# Patient Record
Sex: FEMALE | Race: WHITE | NOT HISPANIC OR LATINO
[De-identification: names, ages, dates, MRNs, and addresses within clinical notes are randomized per-mention and may not be internally consistent; named-entity substitution may affect disease eponyms.]

---

## 2017-01-09 ENCOUNTER — APPOINTMENT (OUTPATIENT)
Dept: HEMATOLOGY ONCOLOGY | Facility: CLINIC | Age: 52
End: 2017-01-09

## 2017-05-19 ENCOUNTER — APPOINTMENT (OUTPATIENT)
Dept: HEMATOLOGY ONCOLOGY | Facility: CLINIC | Age: 52
End: 2017-05-19

## 2017-06-08 ENCOUNTER — OUTPATIENT (OUTPATIENT)
Dept: OUTPATIENT SERVICES | Facility: HOSPITAL | Age: 52
LOS: 1 days | Discharge: HOME | End: 2017-06-08

## 2017-06-08 DIAGNOSIS — D72.829 ELEVATED WHITE BLOOD CELL COUNT, UNSPECIFIED: ICD-10-CM

## 2017-06-08 DIAGNOSIS — A08.39 OTHER VIRAL ENTERITIS: ICD-10-CM

## 2017-06-08 DIAGNOSIS — R22.0 LOCALIZED SWELLING, MASS AND LUMP, HEAD: ICD-10-CM

## 2017-06-08 DIAGNOSIS — T81.30XA DISRUPTION OF WOUND, UNSPECIFIED, INITIAL ENCOUNTER: ICD-10-CM

## 2017-06-08 DIAGNOSIS — R07.9 CHEST PAIN, UNSPECIFIED: ICD-10-CM

## 2017-06-08 DIAGNOSIS — Z93.3 COLOSTOMY STATUS: ICD-10-CM

## 2017-06-08 DIAGNOSIS — I10 ESSENTIAL (PRIMARY) HYPERTENSION: ICD-10-CM

## 2017-06-08 DIAGNOSIS — L40.9 PSORIASIS, UNSPECIFIED: ICD-10-CM

## 2017-06-08 DIAGNOSIS — I25.10 ATHEROSCLEROTIC HEART DISEASE OF NATIVE CORONARY ARTERY WITHOUT ANGINA PECTORIS: ICD-10-CM

## 2017-06-08 DIAGNOSIS — D64.9 ANEMIA, UNSPECIFIED: ICD-10-CM

## 2017-06-08 DIAGNOSIS — K57.32 DIVERTICULITIS OF LARGE INTESTINE WITHOUT PERFORATION OR ABSCESS WITHOUT BLEEDING: ICD-10-CM

## 2017-06-08 DIAGNOSIS — E11.622 TYPE 2 DIABETES MELLITUS WITH OTHER SKIN ULCER: ICD-10-CM

## 2017-06-08 DIAGNOSIS — K11.20 SIALOADENITIS, UNSPECIFIED: ICD-10-CM

## 2017-06-08 DIAGNOSIS — K92.1 MELENA: ICD-10-CM

## 2017-06-08 DIAGNOSIS — R18.8 OTHER ASCITES: ICD-10-CM

## 2017-06-08 DIAGNOSIS — E11.9 TYPE 2 DIABETES MELLITUS WITHOUT COMPLICATIONS: ICD-10-CM

## 2017-06-28 DIAGNOSIS — R18.8 OTHER ASCITES: ICD-10-CM

## 2017-08-09 ENCOUNTER — INPATIENT (INPATIENT)
Facility: HOSPITAL | Age: 52
LOS: 1 days | Discharge: HOME | End: 2017-08-11
Attending: INTERNAL MEDICINE | Admitting: INTERNAL MEDICINE

## 2017-08-09 DIAGNOSIS — A08.39 OTHER VIRAL ENTERITIS: ICD-10-CM

## 2017-08-09 DIAGNOSIS — L40.9 PSORIASIS, UNSPECIFIED: ICD-10-CM

## 2017-08-09 DIAGNOSIS — I25.10 ATHEROSCLEROTIC HEART DISEASE OF NATIVE CORONARY ARTERY WITHOUT ANGINA PECTORIS: ICD-10-CM

## 2017-08-09 DIAGNOSIS — E11.9 TYPE 2 DIABETES MELLITUS WITHOUT COMPLICATIONS: ICD-10-CM

## 2017-08-09 DIAGNOSIS — R07.9 CHEST PAIN, UNSPECIFIED: ICD-10-CM

## 2017-08-09 DIAGNOSIS — R18.8 OTHER ASCITES: ICD-10-CM

## 2017-08-09 DIAGNOSIS — Z93.3 COLOSTOMY STATUS: ICD-10-CM

## 2017-08-09 DIAGNOSIS — K57.32 DIVERTICULITIS OF LARGE INTESTINE WITHOUT PERFORATION OR ABSCESS WITHOUT BLEEDING: ICD-10-CM

## 2017-08-09 DIAGNOSIS — D64.9 ANEMIA, UNSPECIFIED: ICD-10-CM

## 2017-08-09 DIAGNOSIS — K11.20 SIALOADENITIS, UNSPECIFIED: ICD-10-CM

## 2017-08-09 DIAGNOSIS — E11.622 TYPE 2 DIABETES MELLITUS WITH OTHER SKIN ULCER: ICD-10-CM

## 2017-08-09 DIAGNOSIS — K92.1 MELENA: ICD-10-CM

## 2017-08-09 DIAGNOSIS — D72.829 ELEVATED WHITE BLOOD CELL COUNT, UNSPECIFIED: ICD-10-CM

## 2017-08-09 DIAGNOSIS — T81.30XA DISRUPTION OF WOUND, UNSPECIFIED, INITIAL ENCOUNTER: ICD-10-CM

## 2017-08-09 DIAGNOSIS — R22.0 LOCALIZED SWELLING, MASS AND LUMP, HEAD: ICD-10-CM

## 2017-08-09 DIAGNOSIS — I10 ESSENTIAL (PRIMARY) HYPERTENSION: ICD-10-CM

## 2017-08-15 DIAGNOSIS — E11.22 TYPE 2 DIABETES MELLITUS WITH DIABETIC CHRONIC KIDNEY DISEASE: ICD-10-CM

## 2017-08-15 DIAGNOSIS — R18.8 OTHER ASCITES: ICD-10-CM

## 2017-08-15 DIAGNOSIS — K74.60 UNSPECIFIED CIRRHOSIS OF LIVER: ICD-10-CM

## 2017-08-15 DIAGNOSIS — I25.10 ATHEROSCLEROTIC HEART DISEASE OF NATIVE CORONARY ARTERY WITHOUT ANGINA PECTORIS: ICD-10-CM

## 2017-08-15 DIAGNOSIS — Z79.4 LONG TERM (CURRENT) USE OF INSULIN: ICD-10-CM

## 2017-08-15 DIAGNOSIS — Z95.1 PRESENCE OF AORTOCORONARY BYPASS GRAFT: ICD-10-CM

## 2017-08-15 DIAGNOSIS — L02.211 CUTANEOUS ABSCESS OF ABDOMINAL WALL: ICD-10-CM

## 2017-08-15 DIAGNOSIS — I12.9 HYPERTENSIVE CHRONIC KIDNEY DISEASE WITH STAGE 1 THROUGH STAGE 4 CHRONIC KIDNEY DISEASE, OR UNSPECIFIED CHRONIC KIDNEY DISEASE: ICD-10-CM

## 2017-08-15 DIAGNOSIS — Z95.5 PRESENCE OF CORONARY ANGIOPLASTY IMPLANT AND GRAFT: ICD-10-CM

## 2017-08-15 DIAGNOSIS — K76.6 PORTAL HYPERTENSION: ICD-10-CM

## 2017-08-15 DIAGNOSIS — N18.9 CHRONIC KIDNEY DISEASE, UNSPECIFIED: ICD-10-CM

## 2017-08-15 DIAGNOSIS — D63.8 ANEMIA IN OTHER CHRONIC DISEASES CLASSIFIED ELSEWHERE: ICD-10-CM

## 2017-08-15 DIAGNOSIS — C91.10 CHRONIC LYMPHOCYTIC LEUKEMIA OF B-CELL TYPE NOT HAVING ACHIEVED REMISSION: ICD-10-CM

## 2017-08-15 DIAGNOSIS — K43.2 INCISIONAL HERNIA WITHOUT OBSTRUCTION OR GANGRENE: ICD-10-CM

## 2017-08-15 DIAGNOSIS — Z79.82 LONG TERM (CURRENT) USE OF ASPIRIN: ICD-10-CM
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
## 2017-08-17 DIAGNOSIS — S31.109A UNSPECIFIED OPEN WOUND OF ABDOMINAL WALL, UNSPECIFIED QUADRANT WITHOUT PENETRATION INTO PERITONEAL CAVITY, INITIAL ENCOUNTER: ICD-10-CM
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
## 2017-08-17 DIAGNOSIS — L98.499 NON-PRESSURE CHRONIC ULCER OF SKIN OF OTHER SITES WITH UNSPECIFIED SEVERITY: ICD-10-CM

## 2017-08-21 ENCOUNTER — APPOINTMENT (OUTPATIENT)
Dept: SURGERY | Facility: CLINIC | Age: 52
End: 2017-08-21

## 2017-08-21 VITALS
DIASTOLIC BLOOD PRESSURE: 55 MMHG | OXYGEN SATURATION: 94 % | HEART RATE: 41 BPM | WEIGHT: 86 LBS | HEIGHT: 55 IN | BODY MASS INDEX: 19.9 KG/M2 | SYSTOLIC BLOOD PRESSURE: 110 MMHG

## 2017-09-23 ENCOUNTER — INPATIENT (INPATIENT)
Facility: HOSPITAL | Age: 52
LOS: 12 days | Discharge: HOME | End: 2017-10-06
Attending: INTERNAL MEDICINE | Admitting: INTERNAL MEDICINE

## 2017-09-23 DIAGNOSIS — K11.20 SIALOADENITIS, UNSPECIFIED: ICD-10-CM

## 2017-09-23 DIAGNOSIS — R18.8 OTHER ASCITES: ICD-10-CM

## 2017-09-23 DIAGNOSIS — I25.10 ATHEROSCLEROTIC HEART DISEASE OF NATIVE CORONARY ARTERY WITHOUT ANGINA PECTORIS: ICD-10-CM

## 2017-09-23 DIAGNOSIS — D64.9 ANEMIA, UNSPECIFIED: ICD-10-CM

## 2017-09-23 DIAGNOSIS — R22.0 LOCALIZED SWELLING, MASS AND LUMP, HEAD: ICD-10-CM

## 2017-09-23 DIAGNOSIS — A08.39 OTHER VIRAL ENTERITIS: ICD-10-CM

## 2017-09-23 DIAGNOSIS — E11.622 TYPE 2 DIABETES MELLITUS WITH OTHER SKIN ULCER: ICD-10-CM

## 2017-09-23 DIAGNOSIS — K92.1 MELENA: ICD-10-CM

## 2017-09-23 DIAGNOSIS — T81.30XA DISRUPTION OF WOUND, UNSPECIFIED, INITIAL ENCOUNTER: ICD-10-CM

## 2017-09-23 DIAGNOSIS — I10 ESSENTIAL (PRIMARY) HYPERTENSION: ICD-10-CM

## 2017-09-23 DIAGNOSIS — R07.9 CHEST PAIN, UNSPECIFIED: ICD-10-CM

## 2017-09-23 DIAGNOSIS — K57.32 DIVERTICULITIS OF LARGE INTESTINE WITHOUT PERFORATION OR ABSCESS WITHOUT BLEEDING: ICD-10-CM

## 2017-09-23 DIAGNOSIS — E11.9 TYPE 2 DIABETES MELLITUS WITHOUT COMPLICATIONS: ICD-10-CM

## 2017-09-23 DIAGNOSIS — D72.829 ELEVATED WHITE BLOOD CELL COUNT, UNSPECIFIED: ICD-10-CM

## 2017-09-23 DIAGNOSIS — Z93.3 COLOSTOMY STATUS: ICD-10-CM

## 2017-09-23 DIAGNOSIS — L40.9 PSORIASIS, UNSPECIFIED: ICD-10-CM

## 2017-10-12 DIAGNOSIS — E11.9 TYPE 2 DIABETES MELLITUS WITHOUT COMPLICATIONS: ICD-10-CM

## 2017-10-12 DIAGNOSIS — Z79.82 LONG TERM (CURRENT) USE OF ASPIRIN: ICD-10-CM

## 2017-10-12 DIAGNOSIS — D72.829 ELEVATED WHITE BLOOD CELL COUNT, UNSPECIFIED: ICD-10-CM

## 2017-10-12 DIAGNOSIS — I21.4 NON-ST ELEVATION (NSTEMI) MYOCARDIAL INFARCTION: ICD-10-CM
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
## 2017-10-12 DIAGNOSIS — R18.8 OTHER ASCITES: ICD-10-CM

## 2017-10-12 DIAGNOSIS — Z90.49 ACQUIRED ABSENCE OF OTHER SPECIFIED PARTS OF DIGESTIVE TRACT: ICD-10-CM

## 2017-10-12 DIAGNOSIS — I10 ESSENTIAL (PRIMARY) HYPERTENSION: ICD-10-CM

## 2017-10-12 DIAGNOSIS — D63.8 ANEMIA IN OTHER CHRONIC DISEASES CLASSIFIED ELSEWHERE: ICD-10-CM

## 2017-10-12 DIAGNOSIS — N39.0 URINARY TRACT INFECTION, SITE NOT SPECIFIED: ICD-10-CM

## 2017-10-12 DIAGNOSIS — Z79.4 LONG TERM (CURRENT) USE OF INSULIN: ICD-10-CM

## 2017-10-12 DIAGNOSIS — I25.10 ATHEROSCLEROTIC HEART DISEASE OF NATIVE CORONARY ARTERY WITHOUT ANGINA PECTORIS: ICD-10-CM

## 2017-10-12 DIAGNOSIS — T81.30XA DISRUPTION OF WOUND, UNSPECIFIED, INITIAL ENCOUNTER: ICD-10-CM

## 2017-10-12 DIAGNOSIS — E87.1 HYPO-OSMOLALITY AND HYPONATREMIA: ICD-10-CM

## 2017-10-12 DIAGNOSIS — I95.9 HYPOTENSION, UNSPECIFIED: ICD-10-CM

## 2017-10-12 DIAGNOSIS — A04.72 ENTEROCOLITIS DUE TO CLOSTRIDIUM DIFFICILE, NOT SPECIFIED AS RECURRENT: ICD-10-CM

## 2017-10-12 DIAGNOSIS — Z95.5 PRESENCE OF CORONARY ANGIOPLASTY IMPLANT AND GRAFT: ICD-10-CM

## 2017-10-12 DIAGNOSIS — Z95.1 PRESENCE OF AORTOCORONARY BYPASS GRAFT: ICD-10-CM

## 2017-10-12 DIAGNOSIS — K74.60 UNSPECIFIED CIRRHOSIS OF LIVER: ICD-10-CM

## 2017-10-12 DIAGNOSIS — Z51.5 ENCOUNTER FOR PALLIATIVE CARE: ICD-10-CM

## 2017-10-12 DIAGNOSIS — R57.9 SHOCK, UNSPECIFIED: ICD-10-CM

## 2017-10-18 DIAGNOSIS — R57.1 HYPOVOLEMIC SHOCK: ICD-10-CM

## 2017-10-19 DIAGNOSIS — T81.31XA DISRUPTION OF EXTERNAL OPERATION (SURGICAL) WOUND, NOT ELSEWHERE CLASSIFIED, INITIAL ENCOUNTER: ICD-10-CM

## 2017-10-19 DIAGNOSIS — K43.9 VENTRAL HERNIA WITHOUT OBSTRUCTION OR GANGRENE: ICD-10-CM

## 2017-10-19 DIAGNOSIS — L03.311 CELLULITIS OF ABDOMINAL WALL: ICD-10-CM

## 2017-10-20 ENCOUNTER — APPOINTMENT (OUTPATIENT)
Dept: SURGERY | Facility: CLINIC | Age: 52
End: 2017-10-20
Payer: MEDICARE

## 2017-10-20 VITALS
BODY MASS INDEX: 19.9 KG/M2 | WEIGHT: 86 LBS | HEIGHT: 55 IN | SYSTOLIC BLOOD PRESSURE: 119 MMHG | DIASTOLIC BLOOD PRESSURE: 64 MMHG

## 2017-10-20 PROCEDURE — 99215 OFFICE O/P EST HI 40 MIN: CPT

## 2017-10-21 ENCOUNTER — INPATIENT (INPATIENT)
Facility: HOSPITAL | Age: 52
LOS: 11 days | Discharge: HOSPICE | End: 2017-11-02
Attending: HOSPITALIST

## 2017-10-21 DIAGNOSIS — K92.1 MELENA: ICD-10-CM

## 2017-10-21 DIAGNOSIS — D72.829 ELEVATED WHITE BLOOD CELL COUNT, UNSPECIFIED: ICD-10-CM

## 2017-10-21 DIAGNOSIS — I25.10 ATHEROSCLEROTIC HEART DISEASE OF NATIVE CORONARY ARTERY WITHOUT ANGINA PECTORIS: ICD-10-CM

## 2017-10-21 DIAGNOSIS — L40.9 PSORIASIS, UNSPECIFIED: ICD-10-CM

## 2017-10-21 DIAGNOSIS — K11.20 SIALOADENITIS, UNSPECIFIED: ICD-10-CM

## 2017-10-21 DIAGNOSIS — D64.9 ANEMIA, UNSPECIFIED: ICD-10-CM

## 2017-10-21 DIAGNOSIS — T81.30XA DISRUPTION OF WOUND, UNSPECIFIED, INITIAL ENCOUNTER: ICD-10-CM

## 2017-10-21 DIAGNOSIS — I10 ESSENTIAL (PRIMARY) HYPERTENSION: ICD-10-CM

## 2017-10-21 DIAGNOSIS — A08.39 OTHER VIRAL ENTERITIS: ICD-10-CM

## 2017-10-21 DIAGNOSIS — E11.622 TYPE 2 DIABETES MELLITUS WITH OTHER SKIN ULCER: ICD-10-CM

## 2017-10-21 DIAGNOSIS — E11.9 TYPE 2 DIABETES MELLITUS WITHOUT COMPLICATIONS: ICD-10-CM

## 2017-10-21 DIAGNOSIS — R18.8 OTHER ASCITES: ICD-10-CM

## 2017-10-21 DIAGNOSIS — Z93.3 COLOSTOMY STATUS: ICD-10-CM

## 2017-10-21 DIAGNOSIS — R22.0 LOCALIZED SWELLING, MASS AND LUMP, HEAD: ICD-10-CM

## 2017-10-21 DIAGNOSIS — R07.9 CHEST PAIN, UNSPECIFIED: ICD-10-CM

## 2017-10-21 DIAGNOSIS — K57.32 DIVERTICULITIS OF LARGE INTESTINE WITHOUT PERFORATION OR ABSCESS WITHOUT BLEEDING: ICD-10-CM

## 2017-11-02 ENCOUNTER — OUTPATIENT (OUTPATIENT)
Dept: OUTPATIENT SERVICES | Facility: HOSPITAL | Age: 52
LOS: 1 days | Discharge: HOSPICE | End: 2017-11-02

## 2017-11-03 DIAGNOSIS — K76.9 LIVER DISEASE, UNSPECIFIED: ICD-10-CM

## 2017-11-07 DIAGNOSIS — I13.0 HYPERTENSIVE HEART AND CHRONIC KIDNEY DISEASE WITH HEART FAILURE AND STAGE 1 THROUGH STAGE 4 CHRONIC KIDNEY DISEASE, OR UNSPECIFIED CHRONIC KIDNEY DISEASE: ICD-10-CM

## 2017-11-07 DIAGNOSIS — E80.6 OTHER DISORDERS OF BILIRUBIN METABOLISM: ICD-10-CM

## 2017-11-07 DIAGNOSIS — C91.10 CHRONIC LYMPHOCYTIC LEUKEMIA OF B-CELL TYPE NOT HAVING ACHIEVED REMISSION: ICD-10-CM
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
## 2017-11-07 DIAGNOSIS — E87.2 ACIDOSIS: ICD-10-CM

## 2017-11-07 DIAGNOSIS — K43.9 VENTRAL HERNIA WITHOUT OBSTRUCTION OR GANGRENE: ICD-10-CM

## 2017-11-07 DIAGNOSIS — R65.21 SEVERE SEPSIS WITH SEPTIC SHOCK: ICD-10-CM

## 2017-11-07 DIAGNOSIS — A41.9 SEPSIS, UNSPECIFIED ORGANISM: ICD-10-CM

## 2017-11-07 DIAGNOSIS — K76.7 HEPATORENAL SYNDROME: ICD-10-CM

## 2017-11-07 DIAGNOSIS — Z51.5 ENCOUNTER FOR PALLIATIVE CARE: ICD-10-CM

## 2017-11-07 DIAGNOSIS — Z95.5 PRESENCE OF CORONARY ANGIOPLASTY IMPLANT AND GRAFT: ICD-10-CM

## 2017-11-07 DIAGNOSIS — E11.22 TYPE 2 DIABETES MELLITUS WITH DIABETIC CHRONIC KIDNEY DISEASE: ICD-10-CM

## 2017-11-07 DIAGNOSIS — R18.8 OTHER ASCITES: ICD-10-CM

## 2017-11-07 DIAGNOSIS — Z93.3 COLOSTOMY STATUS: ICD-10-CM

## 2017-11-07 DIAGNOSIS — T81.4XXA INFECTION FOLLOWING A PROCEDURE, INITIAL ENCOUNTER: ICD-10-CM

## 2017-11-07 DIAGNOSIS — I95.89 OTHER HYPOTENSION: ICD-10-CM

## 2017-11-07 DIAGNOSIS — K65.2 SPONTANEOUS BACTERIAL PERITONITIS: ICD-10-CM

## 2017-11-07 DIAGNOSIS — N18.3 CHRONIC KIDNEY DISEASE, STAGE 3 (MODERATE): ICD-10-CM

## 2017-11-07 DIAGNOSIS — I50.22 CHRONIC SYSTOLIC (CONGESTIVE) HEART FAILURE: ICD-10-CM

## 2017-11-07 DIAGNOSIS — N17.9 ACUTE KIDNEY FAILURE, UNSPECIFIED: ICD-10-CM

## 2017-11-07 DIAGNOSIS — T81.31XA DISRUPTION OF EXTERNAL OPERATION (SURGICAL) WOUND, NOT ELSEWHERE CLASSIFIED, INITIAL ENCOUNTER: ICD-10-CM

## 2017-11-07 DIAGNOSIS — E87.1 HYPO-OSMOLALITY AND HYPONATREMIA: ICD-10-CM

## 2017-11-07 DIAGNOSIS — Z66 DO NOT RESUSCITATE: ICD-10-CM

## 2017-11-07 DIAGNOSIS — K74.60 UNSPECIFIED CIRRHOSIS OF LIVER: ICD-10-CM

## 2017-11-07 DIAGNOSIS — E11.65 TYPE 2 DIABETES MELLITUS WITH HYPERGLYCEMIA: ICD-10-CM

## 2017-11-07 DIAGNOSIS — J90 PLEURAL EFFUSION, NOT ELSEWHERE CLASSIFIED: ICD-10-CM

## 2017-11-07 DIAGNOSIS — K74.69 OTHER CIRRHOSIS OF LIVER: ICD-10-CM

## 2017-11-07 DIAGNOSIS — I25.10 ATHEROSCLEROTIC HEART DISEASE OF NATIVE CORONARY ARTERY WITHOUT ANGINA PECTORIS: ICD-10-CM

## 2017-11-07 DIAGNOSIS — L40.9 PSORIASIS, UNSPECIFIED: ICD-10-CM

## 2017-11-07 DIAGNOSIS — E86.1 HYPOVOLEMIA: ICD-10-CM

## 2017-11-09 DIAGNOSIS — K65.2 SPONTANEOUS BACTERIAL PERITONITIS: ICD-10-CM

## 2017-11-10 ENCOUNTER — APPOINTMENT (OUTPATIENT)
Dept: SURGERY | Facility: CLINIC | Age: 52
End: 2017-11-10
Payer: MEDICARE

## 2017-11-10 PROCEDURE — 99215 OFFICE O/P EST HI 40 MIN: CPT

## 2017-11-15 DIAGNOSIS — K74.60 UNSPECIFIED CIRRHOSIS OF LIVER: ICD-10-CM

## 2017-11-15 DIAGNOSIS — B15.9 HEPATITIS A WITHOUT HEPATIC COMA: ICD-10-CM

## 2017-11-15 DIAGNOSIS — Z95.9 PRESENCE OF CARDIAC AND VASCULAR IMPLANT AND GRAFT, UNSPECIFIED: ICD-10-CM

## 2017-11-15 DIAGNOSIS — K57.92 DIVERTICULITIS OF INTESTINE, PART UNSPECIFIED, WITHOUT PERFORATION OR ABSCESS WITHOUT BLEEDING: ICD-10-CM

## 2017-11-15 DIAGNOSIS — R18.8 OTHER ASCITES: ICD-10-CM

## 2017-11-15 DIAGNOSIS — Z95.1 PRESENCE OF AORTOCORONARY BYPASS GRAFT: ICD-10-CM

## 2017-11-15 DIAGNOSIS — I10 ESSENTIAL (PRIMARY) HYPERTENSION: ICD-10-CM

## 2017-11-15 DIAGNOSIS — K46.9 UNSPECIFIED ABDOMINAL HERNIA WITHOUT OBSTRUCTION OR GANGRENE: ICD-10-CM

## 2017-12-15 ENCOUNTER — APPOINTMENT (OUTPATIENT)
Dept: SURGERY | Facility: CLINIC | Age: 52
End: 2017-12-15
Payer: MEDICARE

## 2017-12-15 PROCEDURE — 99215 OFFICE O/P EST HI 40 MIN: CPT | Mod: GV

## 2018-01-09 ENCOUNTER — MOBILE ON CALL (OUTPATIENT)
Age: 53
End: 2018-01-09

## 2018-01-09 DIAGNOSIS — R18.8 OTHER ASCITES: ICD-10-CM

## 2018-01-16 ENCOUNTER — OUTPATIENT (OUTPATIENT)
Dept: OUTPATIENT SERVICES | Facility: HOSPITAL | Age: 53
LOS: 1 days | Discharge: HOME | End: 2018-01-16

## 2018-01-16 DIAGNOSIS — I25.10 ATHEROSCLEROTIC HEART DISEASE OF NATIVE CORONARY ARTERY WITHOUT ANGINA PECTORIS: ICD-10-CM

## 2018-01-16 DIAGNOSIS — K57.32 DIVERTICULITIS OF LARGE INTESTINE WITHOUT PERFORATION OR ABSCESS WITHOUT BLEEDING: ICD-10-CM

## 2018-01-16 DIAGNOSIS — K92.1 MELENA: ICD-10-CM

## 2018-01-16 DIAGNOSIS — R22.0 LOCALIZED SWELLING, MASS AND LUMP, HEAD: ICD-10-CM

## 2018-01-16 DIAGNOSIS — R18.8 OTHER ASCITES: ICD-10-CM

## 2018-01-16 DIAGNOSIS — A08.39 OTHER VIRAL ENTERITIS: ICD-10-CM

## 2018-01-16 DIAGNOSIS — Z93.3 COLOSTOMY STATUS: ICD-10-CM

## 2018-01-16 DIAGNOSIS — T81.30XA DISRUPTION OF WOUND, UNSPECIFIED, INITIAL ENCOUNTER: ICD-10-CM

## 2018-01-16 DIAGNOSIS — E11.622 TYPE 2 DIABETES MELLITUS WITH OTHER SKIN ULCER: ICD-10-CM

## 2018-01-16 DIAGNOSIS — D72.829 ELEVATED WHITE BLOOD CELL COUNT, UNSPECIFIED: ICD-10-CM

## 2018-01-16 DIAGNOSIS — K11.20 SIALOADENITIS, UNSPECIFIED: ICD-10-CM

## 2018-01-16 DIAGNOSIS — E11.9 TYPE 2 DIABETES MELLITUS WITHOUT COMPLICATIONS: ICD-10-CM

## 2018-01-16 DIAGNOSIS — I10 ESSENTIAL (PRIMARY) HYPERTENSION: ICD-10-CM

## 2018-01-16 DIAGNOSIS — D64.9 ANEMIA, UNSPECIFIED: ICD-10-CM

## 2018-01-16 DIAGNOSIS — R07.9 CHEST PAIN, UNSPECIFIED: ICD-10-CM

## 2018-01-16 DIAGNOSIS — L40.9 PSORIASIS, UNSPECIFIED: ICD-10-CM

## 2018-01-24 ENCOUNTER — APPOINTMENT (OUTPATIENT)
Dept: SURGERY | Facility: CLINIC | Age: 53
End: 2018-01-24
Payer: MEDICARE

## 2018-01-24 PROCEDURE — 99215 OFFICE O/P EST HI 40 MIN: CPT

## 2018-01-25 ENCOUNTER — OUTPATIENT (OUTPATIENT)
Dept: OUTPATIENT SERVICES | Facility: HOSPITAL | Age: 53
LOS: 1 days | Discharge: HOME | End: 2018-01-25

## 2018-01-25 DIAGNOSIS — K76.9 LIVER DISEASE, UNSPECIFIED: ICD-10-CM

## 2018-02-02 ENCOUNTER — OUTPATIENT (OUTPATIENT)
Dept: OUTPATIENT SERVICES | Facility: HOSPITAL | Age: 53
LOS: 1 days | Discharge: HOME | End: 2018-02-02

## 2018-02-04 DIAGNOSIS — I25.10 ATHEROSCLEROTIC HEART DISEASE OF NATIVE CORONARY ARTERY WITHOUT ANGINA PECTORIS: ICD-10-CM

## 2018-02-04 DIAGNOSIS — K57.32 DIVERTICULITIS OF LARGE INTESTINE WITHOUT PERFORATION OR ABSCESS WITHOUT BLEEDING: ICD-10-CM

## 2018-02-04 DIAGNOSIS — A08.39 OTHER VIRAL ENTERITIS: ICD-10-CM

## 2018-02-04 DIAGNOSIS — K92.1 MELENA: ICD-10-CM

## 2018-02-04 DIAGNOSIS — D64.9 ANEMIA, UNSPECIFIED: ICD-10-CM

## 2018-02-04 DIAGNOSIS — L40.9 PSORIASIS, UNSPECIFIED: ICD-10-CM

## 2018-02-04 DIAGNOSIS — E11.9 TYPE 2 DIABETES MELLITUS WITHOUT COMPLICATIONS: ICD-10-CM

## 2018-02-04 DIAGNOSIS — D72.829 ELEVATED WHITE BLOOD CELL COUNT, UNSPECIFIED: ICD-10-CM

## 2018-02-04 DIAGNOSIS — K11.20 SIALOADENITIS, UNSPECIFIED: ICD-10-CM

## 2018-02-04 DIAGNOSIS — I10 ESSENTIAL (PRIMARY) HYPERTENSION: ICD-10-CM

## 2018-02-04 DIAGNOSIS — R18.8 OTHER ASCITES: ICD-10-CM

## 2018-02-04 DIAGNOSIS — Z93.3 COLOSTOMY STATUS: ICD-10-CM

## 2018-02-04 DIAGNOSIS — E11.622 TYPE 2 DIABETES MELLITUS WITH OTHER SKIN ULCER: ICD-10-CM

## 2018-02-04 DIAGNOSIS — R07.9 CHEST PAIN, UNSPECIFIED: ICD-10-CM

## 2018-02-04 DIAGNOSIS — R22.0 LOCALIZED SWELLING, MASS AND LUMP, HEAD: ICD-10-CM

## 2018-02-04 DIAGNOSIS — T81.30XA DISRUPTION OF WOUND, UNSPECIFIED, INITIAL ENCOUNTER: ICD-10-CM

## 2018-02-08 DIAGNOSIS — R18.8 OTHER ASCITES: ICD-10-CM

## 2018-02-08 DIAGNOSIS — I10 ESSENTIAL (PRIMARY) HYPERTENSION: ICD-10-CM

## 2018-02-08 DIAGNOSIS — E11.9 TYPE 2 DIABETES MELLITUS WITHOUT COMPLICATIONS: ICD-10-CM

## 2018-02-08 DIAGNOSIS — C91.10 CHRONIC LYMPHOCYTIC LEUKEMIA OF B-CELL TYPE NOT HAVING ACHIEVED REMISSION: ICD-10-CM

## 2018-02-08 DIAGNOSIS — K74.69 OTHER CIRRHOSIS OF LIVER: ICD-10-CM

## 2018-02-20 ENCOUNTER — OUTPATIENT (OUTPATIENT)
Dept: OUTPATIENT SERVICES | Facility: HOSPITAL | Age: 53
LOS: 1 days | Discharge: HOME | End: 2018-02-20

## 2018-02-28 ENCOUNTER — APPOINTMENT (OUTPATIENT)
Dept: SURGERY | Facility: CLINIC | Age: 53
End: 2018-02-28
Payer: MEDICARE

## 2018-02-28 VITALS
BODY MASS INDEX: 19.09 KG/M2 | SYSTOLIC BLOOD PRESSURE: 104 MMHG | HEIGHT: 55 IN | DIASTOLIC BLOOD PRESSURE: 68 MMHG | WEIGHT: 82.5 LBS

## 2018-02-28 PROCEDURE — 99215 OFFICE O/P EST HI 40 MIN: CPT

## 2018-03-12 ENCOUNTER — INPATIENT (INPATIENT)
Facility: HOSPITAL | Age: 53
LOS: 2 days | Discharge: HOSPICE | End: 2018-03-15
Attending: INTERNAL MEDICINE | Admitting: INTERNAL MEDICINE

## 2018-03-12 VITALS
HEART RATE: 119 BPM | OXYGEN SATURATION: 100 % | DIASTOLIC BLOOD PRESSURE: 76 MMHG | SYSTOLIC BLOOD PRESSURE: 137 MMHG | TEMPERATURE: 99 F | RESPIRATION RATE: 17 BRPM

## 2018-03-12 DIAGNOSIS — Z98.890 OTHER SPECIFIED POSTPROCEDURAL STATES: Chronic | ICD-10-CM

## 2018-03-12 DIAGNOSIS — Z95.1 PRESENCE OF AORTOCORONARY BYPASS GRAFT: Chronic | ICD-10-CM

## 2018-03-12 LAB
ALBUMIN SERPL ELPH-MCNC: 2.6 G/DL — LOW (ref 3–5.5)
ALP SERPL-CCNC: 906 U/L — HIGH (ref 30–115)
ALT FLD-CCNC: 60 U/L — HIGH (ref 0–41)
ANION GAP SERPL CALC-SCNC: 10 MMOL/L — SIGNIFICANT CHANGE UP (ref 7–14)
APPEARANCE UR: CLEAR — SIGNIFICANT CHANGE UP
APTT BLD: 25.5 SEC — LOW (ref 27–39.2)
AST SERPL-CCNC: 59 U/L — HIGH (ref 0–41)
BACTERIA # UR AUTO: SIGNIFICANT CHANGE UP
BASE EXCESS BLDV CALC-SCNC: -1 MMOL/L — SIGNIFICANT CHANGE UP (ref -2–2)
BASOPHILS # BLD AUTO: 0.04 K/UL — SIGNIFICANT CHANGE UP (ref 0–0.2)
BASOPHILS NFR BLD AUTO: 0.3 % — SIGNIFICANT CHANGE UP (ref 0–1)
BILIRUB SERPL-MCNC: 7.6 MG/DL — HIGH (ref 0.2–1.2)
BILIRUB UR-MCNC: NEGATIVE — SIGNIFICANT CHANGE UP
BLD GP AB SCN SERPL QL: SIGNIFICANT CHANGE UP
BUN SERPL-MCNC: 23 MG/DL — HIGH (ref 10–20)
CALCIUM SERPL-MCNC: 9.1 MG/DL — SIGNIFICANT CHANGE UP (ref 8.5–10.1)
CHLORIDE SERPL-SCNC: 99 MMOL/L — SIGNIFICANT CHANGE UP (ref 98–110)
CO2 SERPL-SCNC: 22 MMOL/L — SIGNIFICANT CHANGE UP (ref 17–32)
COLOR SPEC: YELLOW — SIGNIFICANT CHANGE UP
COMMENT - URINE: SIGNIFICANT CHANGE UP
CREAT SERPL-MCNC: 1 MG/DL — SIGNIFICANT CHANGE UP (ref 0.7–1.5)
DIFF PNL FLD: NEGATIVE — SIGNIFICANT CHANGE UP
EOSINOPHIL # BLD AUTO: 0.17 K/UL — SIGNIFICANT CHANGE UP (ref 0–0.7)
EOSINOPHIL NFR BLD AUTO: 1.2 % — SIGNIFICANT CHANGE UP (ref 0–8)
GLUCOSE SERPL-MCNC: 322 MG/DL — HIGH (ref 70–110)
GLUCOSE UR QL: 100 MG/DL
HCO3 BLDV-SCNC: 24 MMOL/L — SIGNIFICANT CHANGE UP (ref 22–29)
HCT VFR BLD CALC: 28.9 % — LOW (ref 37–47)
HGB BLD-MCNC: 9.6 G/DL — LOW (ref 12–16)
IMM GRANULOCYTES NFR BLD AUTO: 0.4 % — HIGH (ref 0.1–0.3)
INR BLD: 1.13 RATIO — SIGNIFICANT CHANGE UP (ref 0.65–1.3)
KETONES UR-MCNC: NEGATIVE — SIGNIFICANT CHANGE UP
LACTATE BLDV-MCNC: 2.5 MMOL/L — HIGH (ref 0.5–1.6)
LACTATE SERPL-SCNC: 2.8 MMOL/L — HIGH (ref 0.5–2.2)
LEUKOCYTE ESTERASE UR-ACNC: (no result)
LIDOCAIN IGE QN: 31 U/L — SIGNIFICANT CHANGE UP (ref 7–60)
LYMPHOCYTES # BLD AUTO: 43.4 % — SIGNIFICANT CHANGE UP (ref 20.5–51.1)
LYMPHOCYTES # BLD AUTO: 6.12 K/UL — HIGH (ref 1.2–3.4)
MCHC RBC-ENTMCNC: 31.8 PG — HIGH (ref 27–31)
MCHC RBC-ENTMCNC: 33.2 G/DL — SIGNIFICANT CHANGE UP (ref 32–37)
MCV RBC AUTO: 95.7 FL — SIGNIFICANT CHANGE UP (ref 81–99)
MONOCYTES # BLD AUTO: 0.69 K/UL — HIGH (ref 0.1–0.6)
MONOCYTES NFR BLD AUTO: 4.9 % — SIGNIFICANT CHANGE UP (ref 1.7–9.3)
NEUTROPHILS # BLD AUTO: 7.02 K/UL — HIGH (ref 1.4–6.5)
NEUTROPHILS NFR BLD AUTO: 49.8 % — SIGNIFICANT CHANGE UP (ref 42.2–75.2)
NITRITE UR-MCNC: NEGATIVE — SIGNIFICANT CHANGE UP
NRBC # BLD: 0 /100 WBCS — SIGNIFICANT CHANGE UP (ref 0–0)
PCO2 BLDV: 40 MMHG — LOW (ref 41–51)
PH BLDV: 7.38 — SIGNIFICANT CHANGE UP (ref 7.26–7.43)
PH UR: 6.5 — SIGNIFICANT CHANGE UP (ref 5–8)
PLATELET # BLD AUTO: 137 K/UL — SIGNIFICANT CHANGE UP (ref 130–400)
PO2 BLDV: 21 MMHG — SIGNIFICANT CHANGE UP (ref 20–40)
POTASSIUM SERPL-MCNC: 4.7 MMOL/L — SIGNIFICANT CHANGE UP (ref 3.5–5)
POTASSIUM SERPL-SCNC: 4.7 MMOL/L — SIGNIFICANT CHANGE UP (ref 3.5–5)
PROT SERPL-MCNC: 5.3 G/DL — LOW (ref 6–8)
PROT UR-MCNC: NEGATIVE MG/DL — SIGNIFICANT CHANGE UP
PROTHROM AB SERPL-ACNC: 12.2 SEC — SIGNIFICANT CHANGE UP (ref 9.95–12.87)
RBC # BLD: 3.02 M/UL — LOW (ref 4.2–5.4)
RBC # FLD: 13.5 % — SIGNIFICANT CHANGE UP (ref 11.5–14.5)
SAO2 % BLDV: 23 % — SIGNIFICANT CHANGE UP
SODIUM SERPL-SCNC: 131 MMOL/L — LOW (ref 135–146)
SP GR SPEC: 1.01 — SIGNIFICANT CHANGE UP (ref 1.01–1.03)
TYPE + AB SCN PNL BLD: SIGNIFICANT CHANGE UP
UROBILINOGEN FLD QL: 1 MG/DL (ref 0.2–0.2)
WBC # BLD: 14.1 K/UL — HIGH (ref 4.8–10.8)
WBC # FLD AUTO: 14.1 K/UL — HIGH (ref 4.8–10.8)
WBC UR QL: SIGNIFICANT CHANGE UP /HPF

## 2018-03-12 RX ORDER — SPIRONOLACTONE 25 MG/1
50 TABLET, FILM COATED ORAL DAILY
Qty: 0 | Refills: 0 | Status: DISCONTINUED | OUTPATIENT
Start: 2018-03-12 | End: 2018-03-15

## 2018-03-12 RX ORDER — SODIUM CHLORIDE 9 MG/ML
1000 INJECTION INTRAMUSCULAR; INTRAVENOUS; SUBCUTANEOUS ONCE
Qty: 0 | Refills: 0 | Status: COMPLETED | OUTPATIENT
Start: 2018-03-12 | End: 2018-03-12

## 2018-03-12 RX ORDER — INSULIN GLARGINE 100 [IU]/ML
15 INJECTION, SOLUTION SUBCUTANEOUS AT BEDTIME
Qty: 0 | Refills: 0 | Status: DISCONTINUED | OUTPATIENT
Start: 2018-03-12 | End: 2018-03-15

## 2018-03-12 RX ORDER — SODIUM CHLORIDE 9 MG/ML
3 INJECTION INTRAMUSCULAR; INTRAVENOUS; SUBCUTANEOUS ONCE
Qty: 0 | Refills: 0 | Status: COMPLETED | OUTPATIENT
Start: 2018-03-12 | End: 2018-03-12

## 2018-03-12 RX ORDER — SODIUM CHLORIDE 9 MG/ML
1000 INJECTION, SOLUTION INTRAVENOUS
Qty: 0 | Refills: 0 | Status: DISCONTINUED | OUTPATIENT
Start: 2018-03-12 | End: 2018-03-14

## 2018-03-12 RX ORDER — SPIRONOLACTONE 25 MG/1
0 TABLET, FILM COATED ORAL
Qty: 0 | Refills: 0 | COMMUNITY

## 2018-03-12 RX ORDER — FOLIC ACID 0.8 MG
0 TABLET ORAL
Qty: 0 | Refills: 0 | COMMUNITY

## 2018-03-12 RX ORDER — DEXTROSE 50 % IN WATER 50 %
25 SYRINGE (ML) INTRAVENOUS ONCE
Qty: 0 | Refills: 0 | Status: DISCONTINUED | OUTPATIENT
Start: 2018-03-12 | End: 2018-03-15

## 2018-03-12 RX ORDER — FOLIC ACID 0.8 MG
1 TABLET ORAL DAILY
Qty: 0 | Refills: 0 | Status: DISCONTINUED | OUTPATIENT
Start: 2018-03-12 | End: 2018-03-15

## 2018-03-12 RX ORDER — INSULIN LISPRO 100/ML
5 VIAL (ML) SUBCUTANEOUS
Qty: 0 | Refills: 0 | Status: DISCONTINUED | OUTPATIENT
Start: 2018-03-12 | End: 2018-03-15

## 2018-03-12 RX ORDER — HYDROXYZINE HCL 10 MG
0 TABLET ORAL
Qty: 0 | Refills: 0 | COMMUNITY

## 2018-03-12 RX ORDER — DEXTROSE 50 % IN WATER 50 %
1 SYRINGE (ML) INTRAVENOUS ONCE
Qty: 0 | Refills: 0 | Status: DISCONTINUED | OUTPATIENT
Start: 2018-03-12 | End: 2018-03-15

## 2018-03-12 RX ORDER — ASPIRIN/CALCIUM CARB/MAGNESIUM 324 MG
81 TABLET ORAL DAILY
Qty: 0 | Refills: 0 | Status: DISCONTINUED | OUTPATIENT
Start: 2018-03-12 | End: 2018-03-15

## 2018-03-12 RX ORDER — FUROSEMIDE 40 MG
40 TABLET ORAL DAILY
Qty: 0 | Refills: 0 | Status: DISCONTINUED | OUTPATIENT
Start: 2018-03-12 | End: 2018-03-15

## 2018-03-12 RX ORDER — CEFTRIAXONE 500 MG/1
2 INJECTION, POWDER, FOR SOLUTION INTRAMUSCULAR; INTRAVENOUS EVERY 24 HOURS
Qty: 0 | Refills: 0 | Status: DISCONTINUED | OUTPATIENT
Start: 2018-03-12 | End: 2018-03-15

## 2018-03-12 RX ORDER — INSULIN LISPRO 100/ML
VIAL (ML) SUBCUTANEOUS
Qty: 0 | Refills: 0 | Status: DISCONTINUED | OUTPATIENT
Start: 2018-03-12 | End: 2018-03-15

## 2018-03-12 RX ORDER — DEXTROSE 50 % IN WATER 50 %
12.5 SYRINGE (ML) INTRAVENOUS ONCE
Qty: 0 | Refills: 0 | Status: DISCONTINUED | OUTPATIENT
Start: 2018-03-12 | End: 2018-03-15

## 2018-03-12 RX ORDER — GLUCAGON INJECTION, SOLUTION 0.5 MG/.1ML
1 INJECTION, SOLUTION SUBCUTANEOUS ONCE
Qty: 0 | Refills: 0 | Status: DISCONTINUED | OUTPATIENT
Start: 2018-03-12 | End: 2018-03-15

## 2018-03-12 RX ORDER — PANTOPRAZOLE SODIUM 20 MG/1
40 TABLET, DELAYED RELEASE ORAL
Qty: 0 | Refills: 0 | Status: DISCONTINUED | OUTPATIENT
Start: 2018-03-12 | End: 2018-03-15

## 2018-03-12 RX ORDER — PIPERACILLIN AND TAZOBACTAM 4; .5 G/20ML; G/20ML
3.38 INJECTION, POWDER, LYOPHILIZED, FOR SOLUTION INTRAVENOUS ONCE
Qty: 0 | Refills: 0 | Status: COMPLETED | OUTPATIENT
Start: 2018-03-12 | End: 2018-03-12

## 2018-03-12 RX ADMIN — Medication 81 MILLIGRAM(S): at 23:29

## 2018-03-12 RX ADMIN — Medication 40 MILLIGRAM(S): at 23:29

## 2018-03-12 RX ADMIN — SODIUM CHLORIDE 3 MILLILITER(S): 9 INJECTION INTRAMUSCULAR; INTRAVENOUS; SUBCUTANEOUS at 18:51

## 2018-03-12 RX ADMIN — SODIUM CHLORIDE 1000 MILLILITER(S): 9 INJECTION INTRAMUSCULAR; INTRAVENOUS; SUBCUTANEOUS at 18:52

## 2018-03-12 RX ADMIN — PIPERACILLIN AND TAZOBACTAM 200 GRAM(S): 4; .5 INJECTION, POWDER, LYOPHILIZED, FOR SOLUTION INTRAVENOUS at 18:51

## 2018-03-12 NOTE — H&P ADULT - ASSESSMENT
52 yof with pmh of cad s/p cabg liver cirrhosis 2/2 unknown etiology, diverticulitis-->fistula--> colostomy--> colostomy reversal-->incisional abdominal hernia currently on hospice care presented to hospital for cc of leaking ascitic fluid from belly.    1) liver cirrhosis/ ascitis  - IR for paracentesis  - surgery cs    2) cad/ liver cirrhosis/ htn/ dm  - c/w current meds    3) dispo  - hospice 52 yof with pmh of cad s/p cabg liver cirrhosis 2/2 unknown etiology, diverticulitis-->fistula--> colostomy--> colostomy reversal-->incisional abdominal hernia currently on hospice care presented to hospital for cc of leaking ascitic fluid from belly.    1) liver cirrhosis/ ascitis/ leukocytosis/ Hyperbilirubinemia  - start rocephin 2g daily  - c/w hospice care on DC  - IR for paracentesis  - surgery cs- s/p suturing of abdominal wall dehiscense    2) cad/ liver cirrhosis/ htn/ dm  - c/w current meds    3) dvt ppx  - will hold of on sc heparin for now in light of paracentesis procedure in am    4) dispo  - home hospice 52 yof with pmh of cad s/p cabg, end stage liver disease, liver cirrhosis 2/2 unknown etiology, diverticulitis-->fistula--> colostomy--> colostomy reversal-->incisional abdominal hernia--> wound dehiscence currently on hospice care presented to hospital for cc of leaking ascitic fluid from belly. pt being admitted for paracentesis    1) end stage liver disease/ liver cirrhosis/ ascitis/ leukocytosis/ Hyperbilirubinemia  - start rocephin 2g daily  - c/w hospice care on DC  - IR for paracentesis  - surgery cs- s/p suturing of abdominal wall dehiscense    2) cad/ liver cirrhosis/ htn/ dm  - c/w current meds    3) dvt ppx  - will hold of on sc heparin for now in light of paracentesis procedure in am    4) dispo  - home hospice

## 2018-03-12 NOTE — ED PROVIDER NOTE - CONSTITUTIONAL, MLM
normal... Pale, jaundice, Well appearinh, emaciated, awake, alert, oriented to person, place, time/situation and in no apparent distress.

## 2018-03-12 NOTE — CONSULT NOTE ADULT - SUBJECTIVE AND OBJECTIVE BOX
INTERVENTIONAL RADIOLOGY CONSULT:     Procedure Requested: image guided paracentesis    HPI: 53 y/o F w/cirrhosis, history of ventral hernia repair      PAST MEDICAL & SURGICAL HISTORY:  HTN (hypertension)  DM (diabetes mellitus)  Liver cirrhosis  Stented coronary artery  S/P CABG (coronary artery bypass graft)  CLL (chronic lymphocytic leukemia)  Hernia  History of colon resection      MEDICATIONS  (STANDING):    MEDICATIONS  (PRN):      Allergies    No Known Allergies    Intolerances        Social History:   Smoking: Yes [ ]  No [ ]   ______pk yrs  ETOH  Yes [ ]  No [ ]  Social [ ]  DRUGS:  Yes [ ]  No [ ]  if so what______________    FAMILY HISTORY:      Physical Exam:   Vital Signs Last 24 Hrs  T(C): 37 (12 Mar 2018 16:07), Max: 37 (12 Mar 2018 16:07)  T(F): 98.6 (12 Mar 2018 16:07), Max: 98.6 (12 Mar 2018 16:07)  HR: 119 (12 Mar 2018 16:07) (119 - 119)  BP: 137/76 (12 Mar 2018 16:07) (137/76 - 137/76)  BP(mean): --  RR: 17 (12 Mar 2018 16:07) (17 - 17)  SpO2: 100% (12 Mar 2018 16:07) (100% - 100%)    General:     Lungs:    Cardiovascular:     Abdomen:    Extremities:    Musculoskeletal:    Neuro/Psych:        Labs:                         9.6    14.10 )-----------( 137      ( 12 Mar 2018 18:38 )             28.9     03-12    131<L>  |  99  |  23<H>  ----------------------------<  322<H>  4.7   |  22  |  1.0    Ca    9.1      12 Mar 2018 18:38    TPro  5.3<L>  /  Alb  2.6<L>  /  TBili  7.6<H>  /  DBili  x   /  AST  59<H>  /  ALT  60<H>  /  AlkPhos  906<H>  03-12    PT/INR - ( 12 Mar 2018 18:38 )   PT: 12.20 sec;   INR: 1.13 ratio         PTT - ( 12 Mar 2018 18:38 )  PTT:25.5 sec    Pertinent labs:                      9.6    14.10 )-----------( 137      ( 12 Mar 2018 18:38 )             28.9       03-12    131<L>  |  99  |  23<H>  ----------------------------<  322<H>  4.7   |  22  |  1.0    Ca    9.1      12 Mar 2018 18:38    TPro  5.3<L>  /  Alb  2.6<L>  /  TBili  7.6<H>  /  DBili  x   /  AST  59<H>  /  ALT  60<H>  /  AlkPhos  906<H>  03-12      PT/INR - ( 12 Mar 2018 18:38 )   PT: 12.20 sec;   INR: 1.13 ratio         PTT - ( 12 Mar 2018 18:38 )  PTT:25.5 sec    Radiology & Additional Studies:     Radiology imaging reviewed.       ASSESSMENT/ PLAN:           Risks, benefits, and alternatives to treatment discussed. All questions answered with understanding.    Thank you for the courtesy of this consult, please call [phone] with any further questions. INTERVENTIONAL RADIOLOGY CONSULT:     Procedure Requested: image guided paracentesis    HPI: 53 y/o F w/cirrhosis, wound break down. Patient is s/p transverse loop colostomy, sigmoid resection with reversal. Patient has developed persistent drainage from the laparotomy incision. Surgery consulted IR for therapeutic image guided paracentesis to help control wound drainage. Previous paracentesis was approximately one month ago.    PAST MEDICAL & SURGICAL HISTORY:  HTN (hypertension)  DM (diabetes mellitus)  Liver cirrhosis  Stented coronary artery  S/P CABG (coronary artery bypass graft)  CLL (chronic lymphocytic leukemia)  Hernia  History of colon resection    MEDICATIONS  (STANDING):    MEDICATIONS  (PRN):    Allergies    No Known Allergies    Intolerances    Social History:   Smoking: Yes [ ]  No [ ]   ______pk yrs  ETOH  Yes [ ]  No [ ]  Social [ ]  DRUGS:  Yes [ ]  No [ ]  if so what______________    FAMILY HISTORY:    Physical Exam:   Vital Signs Last 24 Hrs  T(C): 37 (12 Mar 2018 16:07), Max: 37 (12 Mar 2018 16:07)  T(F): 98.6 (12 Mar 2018 16:07), Max: 98.6 (12 Mar 2018 16:07)  HR: 119 (12 Mar 2018 16:07) (119 - 119)  BP: 137/76 (12 Mar 2018 16:07) (137/76 - 137/76)  BP(mean): --  RR: 17 (12 Mar 2018 16:07) (17 - 17)  SpO2: 100% (12 Mar 2018 16:07) (100% - 100%)    Labs:                         9.6    14.10 )-----------( 137      ( 12 Mar 2018 18:38 )             28.9     03-12    131<L>  |  99  |  23<H>  ----------------------------<  322<H>  4.7   |  22  |  1.0    Ca    9.1      12 Mar 2018 18:38    TPro  5.3<L>  /  Alb  2.6<L>  /  TBili  7.6<H>  /  DBili  x   /  AST  59<H>  /  ALT  60<H>  /  AlkPhos  906<H>  03-12    PT/INR - ( 12 Mar 2018 18:38 )   PT: 12.20 sec;   INR: 1.13 ratio         PTT - ( 12 Mar 2018 18:38 )  PTT:25.5 sec    Pertinent labs:                      9.6    14.10 )-----------( 137      ( 12 Mar 2018 18:38 )             28.9       03-12    131<L>  |  99  |  23<H>  ----------------------------<  322<H>  4.7   |  22  |  1.0    Ca    9.1      12 Mar 2018 18:38    TPro  5.3<L>  /  Alb  2.6<L>  /  TBili  7.6<H>  /  DBili  x   /  AST  59<H>  /  ALT  60<H>  /  AlkPhos  906<H>  03-12      PT/INR - ( 12 Mar 2018 18:38 )   PT: 12.20 sec;   INR: 1.13 ratio         PTT - ( 12 Mar 2018 18:38 )  PTT:25.5 sec    Radiology & Additional Studies:     Radiology imaging reviewed.       ASSESSMENT/ PLAN:  53 y/o F w/cirrhosis, worsening wound drainage from ascites    1. Ascites - Plan for image guided paracentesis with IR. Will discuss with attendings in AM.    Risks, benefits, and alternatives to treatment discussed. All questions answered with understanding.    Thank you for the courtesy of this consult, please call [phone] with any further questions.

## 2018-03-12 NOTE — H&P ADULT - PMH
CAD (coronary artery disease)    CLL (chronic lymphocytic leukemia)    Diverticulitis    DM (diabetes mellitus)    Hernia    HTN (hypertension)    HTN (hypertension)    Liver cirrhosis    Psoriasis    S/P CABG (coronary artery bypass graft)    Stented coronary artery

## 2018-03-12 NOTE — H&P ADULT - ATTENDING COMMENTS
Addendum:  Patient seen and examined independently. Agree with resident note/ history / physical exam and plan of care with following exceptions. Case discussed with house-staff, nursing and patient/pt decision maker.   pt is going to be monitored overnight as per surgery. Discussed with Hospice. Pt is transferred to inpt hospice and home hospice upon dc when stable.

## 2018-03-12 NOTE — H&P ADULT - NSHPLABSRESULTS_GEN_ALL_CORE
Vital Signs Last 24 Hrs  T(C): 37 (12 Mar 2018 16:07), Max: 37 (12 Mar 2018 16:07)  T(F): 98.6 (12 Mar 2018 16:07), Max: 98.6 (12 Mar 2018 16:07)  HR: 119 (12 Mar 2018 16:07) (119 - 119)  BP: 137/76 (12 Mar 2018 16:07) (137/76 - 137/76)  BP(mean): --  RR: 17 (12 Mar 2018 16:07) (17 - 17)  SpO2: 100% (12 Mar 2018 16:07) (100% - 100%)                            9.6    14.10 )-----------( 137      ( 12 Mar 2018 18:38 )             28.9       03-12    131<L>  |  99  |  23<H>  ----------------------------<  322<H>  4.7   |  22  |  1.0    Ca    9.1      12 Mar 2018 18:38    TPro  5.3<L>  /  Alb  2.6<L>  /  TBili  7.6<H>  /  DBili  x   /  AST  59<H>  /  ALT  60<H>  /  AlkPhos  906<H>  0312              Urinalysis Basic - ( 12 Mar 2018 18:38 )    Color: Yellow / Appearance: Clear / S.010 / pH: x  Gluc: x / Ketone: Negative  / Bili: Negative / Urobili: 1.0 mg/dL   Blood: x / Protein: Negative mg/dL / Nitrite: Negative   Leuk Esterase: Trace / RBC: x / WBC 1-2 /HPF   Sq Epi: x / Non Sq Epi: x / Bacteria: See Note        PT/INR - ( 12 Mar 2018 18:38 )   PT: 12.20 sec;   INR: 1.13 ratio         PTT - ( 12 Mar 2018 18:38 )  PTT:25.5 sec    Lactate Trend   @ 18:38 Lactate:2.8             CAPILLARY BLOOD GLUCOSE

## 2018-03-12 NOTE — CONSULT NOTE ADULT - ATTENDING COMMENTS
Pt know to me .   On Hospice for Liver failure and Chronic Leukemia.   Multiple episodes of ascites draining from abdominal wall hernia.   This time again Suture in the ED twice.   Needs IR drain.   Colloid resuscitation.   GI ( Dr. Hall) for possible GI bleed.   Diuretics.  SBP prophylaxis

## 2018-03-12 NOTE — CONSULT NOTE ADULT - ASSESSMENT
51 YO F WITH LEAKING ASCITES FROM VENTRAL HERNIA    - LEAKING AREA REPAIRED IN ED WITH NYLON SUTURE  - WILL NEED PARACENTESIS TO DECOMPRESS  - SBP ANTIBIOTICS  - GI (ANDRENAZARIOS) TO EVAL FOR GI BLEED  - WILL FOLLOW

## 2018-03-12 NOTE — ED PROVIDER NOTE - MEDICAL DECISION MAKING DETAILS
Will admit for further eval and treatment.  Pt to be admitted to hospitalists Will admit for further eval and treatment.  Pt to be admitted to hospitalists Dr. Brown and Dr. Amador

## 2018-03-12 NOTE — H&P ADULT - NSHPPHYSICALEXAM_GEN_ALL_CORE
GENERAL: NAD, speaks in full sentences, no signs of respiratory distress  HEAD:  Atraumatic, Normocephalic  EYES: EOMI, PERRLA, conjunctiva and sclera clear  NECK: Supple, No JVD  CHEST/LUNG: Clear to auscultation bilaterally; No wheeze; No crackles; No accessory muscles used  HEART: Regular rate and rhythm; No murmurs;   ABDOMEN: Soft, Nontender, Nondistended; Bowel sounds present; No guarding  EXTREMITIES:  2+ Peripheral Pulses, No cyanosis or edema  PSYCH: AAOx3  NEUROLOGY: non-focal  SKIN: No rashes or lesions GENERAL: icterus  HEAD:  Atraumatic, Normocephalic  EYES: EOMI, PERRLA, conjunctiva and sclera clear  NECK: Supple, No JVD  CHEST/LUNG: Clear to auscultation bilaterally;   HEART: Regular rate and rhythm; No murmurs;   ABDOMEN: in abdominal bandage;   PSYCH: AAOx3  NEUROLOGY: non-focal

## 2018-03-12 NOTE — H&P ADULT - PSH
H/O colostomy    History of colon resection    S/P CABG (coronary artery bypass graft)    S/P cystoscopy

## 2018-03-12 NOTE — ED PROVIDER NOTE - OBJECTIVE STATEMENT
52 YOF PMHx CLL, DM, s/p colon resection and subsequent hernia and subsequent Liver cirrhosis a/w ascites, p/w abdominal ascites and fluid leaking from hernia.   Per pt and sister and Dr. Tyler, pt is hospice care and requires IR drainiage of ascites every 6 weeks.   Pt called Dr. Tyler today bc pt her ascites was draining from incisional sites.     Impression: Ascites w/ fluid leakage  Plan: Dr. Tyler was bedside to suture hernia/ incision site causing drainage.   Will check labs, EKG, and admit for IR drainage as requested.

## 2018-03-12 NOTE — ED ADULT NURSE NOTE - PMH
CLL (chronic lymphocytic leukemia)    DM (diabetes mellitus)    Hernia    HTN (hypertension)    Liver cirrhosis    S/P CABG (coronary artery bypass graft)    Stented coronary artery

## 2018-03-12 NOTE — ED ADULT NURSE NOTE - OBJECTIVE STATEMENT
pt presents to the ED with her sister who takes care of her at home who states pt started to have drainage from her abdominal. pt has ascites and had this happen before in the past. pt denies fevers/chills, nausea, vomiting. Sister also states pt had dark red stool. pt denies cp/sob.

## 2018-03-12 NOTE — H&P ADULT - NSHPSOCIALHISTORY_GEN_ALL_CORE
Marital Status:  (   )    (   ) Single    (   )    (  )   Occupation:   Lives with: (  ) alone  (  ) children   (  ) spouse   (  ) parents  (  ) other  Recent Travel:     Substance Use (street drugs): (  ) never used  (  ) other:  Tobacco Usage:  (   ) never smoked   (   ) former smoker   (   ) current smoker  (     ) pack year  Alcohol Usage:  Sexual History:

## 2018-03-12 NOTE — H&P ADULT - HISTORY OF PRESENT ILLNESS
52 yof with pmh of cad s/p cabg liver cirrhosis 2/2 unknown etiology, diverticulitis-->fistula--> colostomy--> colostomy reversal-->incisional abdominal hernia currently on hospice care presented to hospital for cc of leaking ascitic fluid from belly. pt usually gets paracentesis every 6 weeks by IR.  - pt currenty being admitted for paracentesis  - pt was seen by surgery & IR & is scheduled to get a paracentesis by IR  - pt had multiple hospital visits for similar complains. 52 yof with pmh of cad s/p cabg, end stage liver disease, liver cirrhosis 2/2 unknown etiology, diverticulitis-->fistula--> colostomy--> colostomy reversal-->incisional abdominal hernia--> wound dehiscence currently on hospice care presented to hospital for cc of leaking ascitic fluid from belly. pt usually gets paracentesis every 6 weeks by IR.  - pt currenty being admitted for paracentesis  - pt was seen by surgery & IR & is scheduled to get a paracentesis by IR  - pt had multiple hospital visits for similar complains.  - pt form home hospice; sister takes care of her

## 2018-03-12 NOTE — H&P ADULT - NSHPOUTPATIENTPROVIDERS_GEN_ALL_CORE
onco- dr andersen; gi - sharifa, surgery- jim; pcp- katy; IR at [facility]; hepatology- bach at [facility] for now

## 2018-03-12 NOTE — CONSULT NOTE ADULT - SUBJECTIVE AND OBJECTIVE BOX
DAPHNE TOYA [id number]  52y Female    HPI:  53YO FEMALE KNOWN TO SURGICAL SERVICE WITH ENDSTAGE CIRRHOSIS, VENTRAL HERNIA WITH ASCITES LEAKING FROM AREAS OF SKIN BREAKDOWN, WAS PREVIOUSLY TAPPED 1 MONTH AGO, HAS BEEN SUTURED MULTIPLE TIMES    PAST MEDICAL & SURGICAL HISTORY:  DIVERTICULITIS TRANSVERSE LOOP COLOSTOMY, CHRIS SIGMOID RESECTION, REVERSAL, COLOVESICULAR FISUTLA, CAD, HTN CABG, MI, LIVER MASS CIRRHOSIS, CLL, COPD      MEDICATIONS  (STANDING):  piperacillin/tazobactam IVPB. 3.375 Gram(s) IV Intermittent once  sodium chloride 0.9% Bolus 1000 milliLiter(s) IV Bolus once  sodium chloride 0.9% lock flush 3 milliLiter(s) IV Push once    MEDICATIONS  (PRN):      Allergies    No Known Allergies    Intolerances        REVIEW OF SYSTEMS    [ ] A ten-point review of systems was otherwise negative except as noted.    Vital Signs Last 24 Hrs  T(C): 37 (12 Mar 2018 16:07), Max: 37 (12 Mar 2018 16:07)  T(F): 98.6 (12 Mar 2018 16:07), Max: 98.6 (12 Mar 2018 16:07)  HR: 119 (12 Mar 2018 16:07) (119 - 119)  BP: 137/76 (12 Mar 2018 16:07) (137/76 - 137/76)  BP(mean): --  RR: 17 (12 Mar 2018 16:07) (17 - 17)  SpO2: 100% (12 Mar 2018 16:07) (100% - 100%)    PHYSICAL EXAM:  GENERAL: NAD, well-appearing  CHEST/LUNG: Clear to auscultation bilaterally  HEART: Regular rate and rhythm  ABDOMEN: Soft, Nontender, Distended LARGE VENTRAL HERNIA WITH SKIN CHANGES, LEAKING ASCITIES, MULTPLE PRIOR REPAIRS  EXTREMITIES:  No clubbing, cyanosis, or edema      LABS:  Labs:  CAPILLARY BLOOD GLUCOSE                      LFTs:         Coags:                RADIOLOGY & ADDITIONAL STUDIES:

## 2018-03-12 NOTE — ED PROVIDER NOTE - PROGRESS NOTE DETAILS
all studies reviewed.  Will admit to medicine for IR drainage.   Last drainage was three liters.  The prior was 6 liters.

## 2018-03-13 RX ORDER — INSULIN LISPRO 100/ML
6 VIAL (ML) SUBCUTANEOUS ONCE
Qty: 0 | Refills: 0 | Status: COMPLETED | OUTPATIENT
Start: 2018-03-13 | End: 2018-03-13

## 2018-03-13 RX ORDER — LACTULOSE 10 G/15ML
10 SOLUTION ORAL ONCE
Qty: 0 | Refills: 0 | Status: COMPLETED | OUTPATIENT
Start: 2018-03-13 | End: 2018-03-13

## 2018-03-13 RX ADMIN — Medication 1 MILLIGRAM(S): at 00:56

## 2018-03-13 RX ADMIN — CEFTRIAXONE 100 GRAM(S): 500 INJECTION, POWDER, FOR SOLUTION INTRAMUSCULAR; INTRAVENOUS at 06:16

## 2018-03-13 RX ADMIN — Medication 5: at 16:45

## 2018-03-13 RX ADMIN — SPIRONOLACTONE 50 MILLIGRAM(S): 25 TABLET, FILM COATED ORAL at 06:17

## 2018-03-13 RX ADMIN — PANTOPRAZOLE SODIUM 40 MILLIGRAM(S): 20 TABLET, DELAYED RELEASE ORAL at 06:16

## 2018-03-13 RX ADMIN — INSULIN GLARGINE 15 UNIT(S): 100 INJECTION, SOLUTION SUBCUTANEOUS at 23:09

## 2018-03-13 RX ADMIN — Medication 5 UNIT(S): at 16:46

## 2018-03-13 RX ADMIN — Medication 1 TABLET(S): at 11:32

## 2018-03-13 RX ADMIN — Medication 81 MILLIGRAM(S): at 11:34

## 2018-03-13 RX ADMIN — Medication 6 UNIT(S): at 23:10

## 2018-03-13 RX ADMIN — LACTULOSE 10 GRAM(S): 10 SOLUTION ORAL at 23:08

## 2018-03-13 RX ADMIN — Medication 1 MILLIGRAM(S): at 11:32

## 2018-03-13 NOTE — PROGRESS NOTE ADULT - ASSESSMENT
Assessment:  52y Female patient admitted with incisional hernia, leaking ascites, cirrhosis; dressing changedx2, 2-0 nylon figure of 8 sutures placed in abdomen to contain leaking ascites x2 in ED, with the above physical exam, labs, and imaging findings.    Plan:  admitted to medicine  US abdomen with results as above  F/U IR in AM regarding paracentesis   h/o large volume paracentesis, may require albumin  F/U AM labs  replete electrolytes as needed  Strict I+O  Diet as tolerated    Date/Time: 03-13-18 @ 03:36

## 2018-03-13 NOTE — PROGRESS NOTE ADULT - SUBJECTIVE AND OBJECTIVE BOX
Interventional Radiology Follow- Up Note    Spoke to Esperanza MALDONADO, charge RN for IR; primary team wants to temporarily cancel image guided paracentesis.    Vitals: T(F): 98.6 (03-12-18 @ 16:07), Max: 98.6 (03-12-18 @ 16:07)  HR: 98 (03-13-18 @ 06:08) (98 - 119)  BP: 131/74 (03-13-18 @ 06:08) (131/74 - 137/76)  RR: 20 (03-13-18 @ 06:08) (17 - 20)  SpO2: 100% (03-12-18 @ 16:07) (100% - 100%)  Wt(kg): --    Impression: 52y Female admitted with ASCITES    Plan:    1. Ascites - discussed with IR attending; will order abdominal ultrasound. Attempt made to contact primary admitted resident.    Please call Interventional Radiology [phone] with any questions, concerns, or issues regarding above. Interventional Radiology Follow- Up Note    Spoke to Esperanza MALDONADO, charge RN for IR; primary team wants to temporarily cancel image guided paracentesis.    Vitals: T(F): 98.6 (03-12-18 @ 16:07), Max: 98.6 (03-12-18 @ 16:07)  HR: 98 (03-13-18 @ 06:08) (98 - 119)  BP: 131/74 (03-13-18 @ 06:08) (131/74 - 137/76)  RR: 20 (03-13-18 @ 06:08) (17 - 20)  SpO2: 100% (03-12-18 @ 16:07) (100% - 100%)  Wt(kg): --    Impression: 52y Female admitted with ASCITES    Plan:    1. Ascites - discussed with IR attending; will order abdominal ultrasound. IR procedure order discontinued. Attempt made to contact primary admitted resident.    Please call Interventional Radiology [phone] with any questions, concerns, or issues regarding above.

## 2018-03-13 NOTE — PROVIDER CONTACT NOTE (OTHER) - BACKGROUND
pt arrived from ED, FS checked before bedtime. Fingerstick elevated 419. Pt scheduled for 10 units Lantus.

## 2018-03-13 NOTE — PROGRESS NOTE ADULT - SUBJECTIVE AND OBJECTIVE BOX
TOYA SERNA, female, 52y (67),   MRN-[id number]  Admit Date: 18 (1d)    Chief Complaint  Patient is a 52y old  Female who presents with a chief complaint of leaking ascitic fluid from belly (12 Mar 2018 22:16)      Past Medical and Surgical History  PAST MEDICAL & SURGICAL HISTORY:  Psoriasis  HTN (hypertension)  CAD (coronary artery disease)  Diverticulitis  HTN (hypertension)  DM (diabetes mellitus)  Liver cirrhosis  Stented coronary artery  S/P CABG (coronary artery bypass graft)  CLL (chronic lymphocytic leukemia)  Hernia  S/P cystoscopy  H/O colostomy  S/P CABG (coronary artery bypass graft)  History of colon resection      Current Medications:  MEDICATIONS  (STANDING):  aspirin  chewable 81 milliGRAM(s) Oral daily  cefTRIAXone   IVPB 2 Gram(s) IV Intermittent every 24 hours  dextrose 5%. 1000 milliLiter(s) (50 mL/Hr) IV Continuous <Continuous>  dextrose 50% Injectable 12.5 Gram(s) IV Push once  dextrose 50% Injectable 25 Gram(s) IV Push once  dextrose 50% Injectable 25 Gram(s) IV Push once  folic acid 1 milliGRAM(s) Oral daily  furosemide    Tablet 40 milliGRAM(s) Oral daily  insulin glargine Injectable (LANTUS) 15 Unit(s) SubCutaneous at bedtime  insulin lispro (HumaLOG) corrective regimen sliding scale   SubCutaneous three times a day before meals  insulin lispro Injectable (HumaLOG) 5 Unit(s) SubCutaneous before breakfast  insulin lispro Injectable (HumaLOG) 5 Unit(s) SubCutaneous before lunch  insulin lispro Injectable (HumaLOG) 5 Unit(s) SubCutaneous before dinner  multivitamin 1 Tablet(s) Oral daily  pantoprazole    Tablet 40 milliGRAM(s) Oral before breakfast  spironolactone 50 milliGRAM(s) Oral daily    MEDICATIONS  (PRN):  dextrose Gel 1 Dose(s) Oral once PRN Blood Glucose LESS THAN 70 milliGRAM(s)/deciliter  glucagon  Injectable 1 milliGRAM(s) IntraMuscular once PRN Glucose LESS THAN 70 milligrams/deciliter      Interval History:  No acute events overnight. No complaints at this time.    Vital Signs:  T(F): 98.6 (18 @ 16:07), Max: 98.6 (18 @ 16:07)  HR: 98 (18 @ 06:08) (98 - 119)  BP: 131/74 (18 @ 06:08) (131/74 - 137/76)  RR: 20 (18 @ 06:08) (17 - 20)  SpO2: 100% (18 @ 16:07) (100% - 100%)  CAPILLARY BLOOD GLUCOSE  149 (12 Mar 2018 23:29)          Physical Exam:  General: Not in distress.   HEENT: Moist mucus membranes. PERRLA.  Cardio: Regular rate and rhythm, S1, S2, no murmur, rub, or gallop.  Pulm: Clear to auscultation bilaterally. No wheezing, rales, or rhonchi  Abdomen: Soft, non-tender, non-distended. Normoactive bowel sounds  Extremities: No cyanosis or edema bilaterally. No calf tenderness to palpation  Neuro: A&O x3. No focal deficits. CN II - XII grossly intact.    Labs and Imaging:  CBC Full  -  ( 12 Mar 2018 18:38 )  WBC Count : 14.10 K/uL  Hemoglobin : 9.6 g/dL  Hematocrit : 28.9 %  Platelet Count - Automated : 137 K/uL  Mean Cell Volume : 95.7 fL  Mean Cell Hemoglobin : 31.8 pg  Mean Cell Hemoglobin Concentration : 33.2 g/dL  Auto Neutrophil # : 7.02 K/uL  Auto Lymphocyte # : 6.12 K/uL  Auto Monocyte # : 0.69 K/uL  Auto Eosinophil # : 0.17 K/uL  Auto Basophil # : 0.04 K/uL  Auto Neutrophil % : 49.8 %  Auto Lymphocyte % : 43.4 %  Auto Monocyte % : 4.9 %  Auto Eosinophil % : 1.2 %  Auto Basophil % : 0.3 %    RDW: 13.5    PT/INR/PTT: 18 @ 18:38  12.20 | 25.5        ^      1.13    BMP: 18 @ 18:38  131 | 99 | 23   -----------------< 322  4.7  | 22 | 1.0  eGFR(AA): 75, eGFR (non-AA): 64  Ca 9.1, Mg --, P --    LFTs: 18 @ 18:38  TP  5.3  | 2.6 Alb   ---------------  TB  7.6  | --  DB   ---------------  ALT 60   | 59  AST          ^        906         ALK      LFTs: 18 @ 18:38  Ca  9.1  | 59 AST   -----------------  TP  5.3  | 60 ALT  -----------------  Alb 2.6  | 906 ALK          ^        7.6         TB      Cardiac Enzymes:    Urinalysis:  Urinalysis Basic - ( 12 Mar 2018 18:38 )    Color: Yellow / Appearance: Clear / S.010 / pH: x  Gluc: x / Ketone: Negative  / Bili: Negative / Urobili: 1.0 mg/dL   Blood: x / Protein: Negative mg/dL / Nitrite: Negative   Leuk Esterase: Trace / RBC: x / WBC 1-2 /HPF   Sq Epi: x / Non Sq Epi: x / Bacteria: See Note      Home Medications:  Home Medications:  aspirin 81 mg oral tablet, chewable: 1 tab(s) orally once a day (12 Mar 2018 18:49)  docusate:  (12 Mar 2018 18:49)  folic acid: 1 milligram(s) orally once a day (12 Mar 2018 22:56)  furosemide 40 mg oral tablet: 1 tab(s) orally once a day (12 Mar 2018 18:49)  HumaLOG:  (12 Mar 2018 18:49)  Levemir 100 units/mL subcutaneous solution:  (12 Mar 2018 18:49)  LORazepam 1 mg oral tablet:  (12 Mar 2018 18:49)  pantoprazole:  (12 Mar 2018 18:49)  potassium chloride:  (12 Mar 2018 18:49)  spironolactone: 50 milligram(s) orally once a day (12 Mar 2018 22:57)      Assessment:  52y female with PMH listed presented for .  Found to have Ascites      Plan:  Ascites      Nanci Okeefe MD.  Date/Time: 18 @ 08:23 TOYA SERNA, female, 52y (67),   MRN-[id number]  Admit Date: 18 (1d)    Chief Complaint  Patient is a 52y old  Female who presents with a chief complaint of leaking ascitic fluid from belly (12 Mar 2018 22:16)      Past Medical and Surgical History  PAST MEDICAL & SURGICAL HISTORY:  Psoriasis  HTN (hypertension)  CAD (coronary artery disease)  Diverticulitis  HTN (hypertension)  DM (diabetes mellitus)  Liver cirrhosis  Stented coronary artery  S/P CABG (coronary artery bypass graft)  CLL (chronic lymphocytic leukemia)  Hernia  S/P cystoscopy  H/O colostomy  S/P CABG (coronary artery bypass graft)  History of colon resection      Current Medications:  MEDICATIONS  (STANDING):  aspirin  chewable 81 milliGRAM(s) Oral daily  cefTRIAXone   IVPB 2 Gram(s) IV Intermittent every 24 hours  dextrose 5%. 1000 milliLiter(s) (50 mL/Hr) IV Continuous <Continuous>  dextrose 50% Injectable 12.5 Gram(s) IV Push once  dextrose 50% Injectable 25 Gram(s) IV Push once  dextrose 50% Injectable 25 Gram(s) IV Push once  folic acid 1 milliGRAM(s) Oral daily  furosemide    Tablet 40 milliGRAM(s) Oral daily  insulin glargine Injectable (LANTUS) 15 Unit(s) SubCutaneous at bedtime  insulin lispro (HumaLOG) corrective regimen sliding scale   SubCutaneous three times a day before meals  insulin lispro Injectable (HumaLOG) 5 Unit(s) SubCutaneous before breakfast  insulin lispro Injectable (HumaLOG) 5 Unit(s) SubCutaneous before lunch  insulin lispro Injectable (HumaLOG) 5 Unit(s) SubCutaneous before dinner  multivitamin 1 Tablet(s) Oral daily  pantoprazole    Tablet 40 milliGRAM(s) Oral before breakfast  spironolactone 50 milliGRAM(s) Oral daily    MEDICATIONS  (PRN):  dextrose Gel 1 Dose(s) Oral once PRN Blood Glucose LESS THAN 70 milliGRAM(s)/deciliter  glucagon  Injectable 1 milliGRAM(s) IntraMuscular once PRN Glucose LESS THAN 70 milligrams/deciliter      Interval History:  No acute events overnight. No complaints at this time.    Vital Signs:  T(F): 98.6 (18 @ 16:07), Max: 98.6 (18 @ 16:07)  HR: 98 (18 @ 06:08) (98 - 119)  BP: 131/74 (18 @ 06:08) (131/74 - 137/76)  RR: 20 (18 @ 06:08) (17 - 20)  SpO2: 100% (18 @ 16:07) (100% - 100%)  CAPILLARY BLOOD GLUCOSE  149 (12 Mar 2018 23:29)          Physical Exam:  General: Not in distress. Fragile   HEENT: Moist mucus membranes. PERRLA.  Cardio: Regular rate and rhythm, S1, S2, no murmur, rub, or gallop.  Pulm: Clear to auscultation bilaterally. No wheezing, rales, or rhonchi  Abdomen: Soft, non-tender, distended  Extremities: No cyanosis or edema bilaterally. No calf tenderness to palpation  Neuro: A&O x3. No focal deficits. CN II - XII grossly intact.    Labs and Imaging:  CBC Full  -  ( 12 Mar 2018 18:38 )  WBC Count : 14.10 K/uL  Hemoglobin : 9.6 g/dL  Hematocrit : 28.9 %  Platelet Count - Automated : 137 K/uL  Mean Cell Volume : 95.7 fL  Mean Cell Hemoglobin : 31.8 pg  Mean Cell Hemoglobin Concentration : 33.2 g/dL  Auto Neutrophil # : 7.02 K/uL  Auto Lymphocyte # : 6.12 K/uL  Auto Monocyte # : 0.69 K/uL  Auto Eosinophil # : 0.17 K/uL  Auto Basophil # : 0.04 K/uL  Auto Neutrophil % : 49.8 %  Auto Lymphocyte % : 43.4 %  Auto Monocyte % : 4.9 %  Auto Eosinophil % : 1.2 %  Auto Basophil % : 0.3 %    RDW: 13.5    PT/INR/PTT: 18 @ 18:38  12.20 | 25.5        ^      1.13    BMP: 18 @ 18:38  131 | 99 | 23   -----------------< 322  4.7  | 22 | 1.0  eGFR(AA): 75, eGFR (non-AA): 64  Ca 9.1, Mg --, P --    LFTs: 18 @ 18:38  TP  5.3  | 2.6 Alb   ---------------  TB  7.6  | --  DB   ---------------  ALT 60   | 59  AST          ^        906         ALK      LFTs: 18 @ 18:38  Ca  9.1  | 59 AST   -----------------  TP  5.3  | 60 ALT  -----------------  Alb 2.6  | 906 ALK          ^        7.6         TB      Urinalysis:  Urinalysis Basic - ( 12 Mar 2018 18:38 )    Color: Yellow / Appearance: Clear / S.010 / pH: x  Gluc: x / Ketone: Negative  / Bili: Negative / Urobili: 1.0 mg/dL   Blood: x / Protein: Negative mg/dL / Nitrite: Negative   Leuk Esterase: Trace / RBC: x / WBC 1-2 /HPF   Sq Epi: x / Non Sq Epi: x / Bacteria: See Note      Home Medications:  Home Medications:  aspirin 81 mg oral tablet, chewable: 1 tab(s) orally once a day (12 Mar 2018 18:49)  docusate:  (12 Mar 2018 18:49)  folic acid: 1 milligram(s) orally once a day (12 Mar 2018 22:56)  furosemide 40 mg oral tablet: 1 tab(s) orally once a day (12 Mar 2018 18:49)  HumaLOG:  (12 Mar 2018 18:49)  Levemir 100 units/mL subcutaneous solution:  (12 Mar 2018 18:49)  LORazepam 1 mg oral tablet:  (12 Mar 2018 18:49)  pantoprazole:  (12 Mar 2018 18:49)  potassium chloride:  (12 Mar 2018 18:49)  spironolactone: 50 milligram(s) orally once a day (12 Mar 2018 22:57)      Assessment:  52y female with PMH listed presented for .  Found to have Ascites      Plan:  Ascites      Nanci Okeefe MD.  Date/Time: 18 @ 08:23

## 2018-03-13 NOTE — PROGRESS NOTE ADULT - SUBJECTIVE AND OBJECTIVE BOX
Progress Note: General Surgery  Patient: TOYA SERNA , 52y (1965)Female   MRN: [id number]  Location: [facility] ER Hold 005 A  Visit: 18 Inpatient  Date: 18 @ 03:36  Hospital Day: 2    Procedure/Diagnosis: incisional hernia, leaking ascites, cirrhosis  Events over 24h: dressing changedx2, 2-0 nylon figure of 8 sutures placed in abdomen to contain leaking ascites x2 in ED    Vitals: T(F): 98.6 (18 @ 16:07), Max: 98.6 (18 @ 16:07)  HR: 119 (18 @ 16:07)  BP: 137/76 (18 @ 16:07) (137/76 - 137/76)  RR: 17 (18 @ 16:07)  SpO2: 100% (18 @ 16:07)    I+O NR    Diet: Diet, Regular:   Supplement Feeding Modality:  Oral  Ensure Enlive Cans or Servings Per Day:  1       Frequency:  Three Times a day (18 @ 23:09)  IV Fluids: no , Type: IVL    Physical Examination:  General Appearance: NAD, alert and cooperative  HEENT: NCAT, WNL  Heart: S1 and S2. No murmurs. Rhythm is regular  Lungs: Clear to auscultation BL without rales, rhonchi, wheezing, crackles or diminished breath sounds.  Abdomen:  Positive bowel sounds. Soft, distended, nontender. thin skin, leakin serosanguinous fluid, 2 small holes with sutures in place   Skin: Warm/dry   Incisions/Wounds: Dressings in place: abdominal binder, abd pads, kerlex, towel    Medications:  aspirin  chewable 81 milliGRAM(s) Oral daily  dextrose 5%. 1000 milliLiter(s) (50 mL/Hr) IV Continuous <Continuous>  dextrose 50% Injectable 12.5 Gram(s) IV Push once  dextrose 50% Injectable 25 Gram(s) IV Push once  dextrose 50% Injectable 25 Gram(s) IV Push once  dextrose Gel 1 Dose(s) Oral once PRN Blood Glucose LESS THAN 70 milliGRAM(s)/deciliter  folic acid 1 milliGRAM(s) Oral daily  furosemide    Tablet 40 milliGRAM(s) Oral daily  glucagon  Injectable 1 milliGRAM(s) IntraMuscular once PRN Glucose LESS THAN 70 milligrams/deciliter  insulin glargine Injectable (LANTUS) 15 Unit(s) SubCutaneous at bedtime  insulin lispro (HumaLOG) corrective regimen sliding scale   SubCutaneous three times a day before meals  insulin lispro Injectable (HumaLOG) 5 Unit(s) SubCutaneous before breakfast  insulin lispro Injectable (HumaLOG) 5 Unit(s) SubCutaneous before lunch  insulin lispro Injectable (HumaLOG) 5 Unit(s) SubCutaneous before dinner  multivitamin 1 Tablet(s) Oral daily  spironolactone 50 milliGRAM(s) Oral daily    GI Prophylaxis: pantoprazole    Tablet 40 milliGRAM(s) Oral before breakfast  Antibiotics: cefTRIAXone   IVPB 2 Gram(s) IV Intermittent every 24 hours    Labs:                        9.6    14.10 )-----------( 137      ( 12 Mar 2018 18:38 )             28.9         131<L>  |  99  |  23<H>  ----------------------------<  322<H>  4.7   |  22  |  1.0    Ca    9.1      12 Mar 2018 18:38    TPro  5.3<L>  /  Alb  2.6<L>  /  TBili  7.6<H>  /  DBili  x   /  AST  59<H>  /  ALT  60<H>  /  AlkPhos  906<H>      Urinalysis Basic - ( 12 Mar 2018 18:38 )    Color: Yellow / Appearance: Clear / S.010 / pH: x  Gluc: x / Ketone: Negative  / Bili: Negative / Urobili: 1.0 mg/dL   Blood: x / Protein: Negative mg/dL / Nitrite: Negative   Leuk Esterase: Trace / RBC: x / WBC 1-2 /HPF   Sq Epi: x / Non Sq Epi: x / Bacteria: See Note    Imaging:  < from: US Abdomen Complete (18 @ 21:54) >  EXAM:  US ABDOMEN COMPLETE        PROCEDURE DATE:  2018    INTERPRETATION:  CLINICAL STATEMENT: Cirrhosis and ascites..  COMPARISON: Abdominal ultrasound dated 2018.  PROCEDURE: Ultrasound of the abdomen was performed.    FINDINGS:    LIVER:  The liver is heterogeneous in echotexture and slightly nodular   contour compatible patient's known history of cirrhosis. No suspicious   masses.     GALLBLADDER/BILIARY TREE:  Cholelithiasis. Mild gallbladder wall   thickening.Negative sonographic Herzog's sign.  No biliary ductal   dilation. Common bile duct measures 5 mm, which is normal.     PANCREAS:  Visualized head and neck are unremarkable. Remainder obscured   by overlying bowel gas.    SPLEEN:  Enlarged measuring 14.4 cm. No focal lesion.     KIDNEYS:  Right kidney measures 8.4 cm and left kidney measures 8.6 cm in   length.  No hydronephrosis, stone or solid mass.    AORTA/IVC:  Visualized proximal portions unremarkable.    ASCITES:  There is small volume abdominal ascites most notably in the   right upper and lower quadrants..    IMPRESSION:  Findings compatible with patient's known cirrhosis and portal hypertension.  Small volume abdominal ascites, most notably in the right upper and right lowerquadrants.  Cholelithiasis. Mild gallbladder wall thickening (nonspecific in the setting of cirrhosis) without definite sonographic evidence for cholecystitis.  < end of copied text >

## 2018-03-13 NOTE — PROGRESS NOTE ADULT - ASSESSMENT
52 yof with pmh of cad s/p cabg, end stage liver disease, liver cirrhosis 2/2 unknown etiology, diverticulitis-->fistula--> colostomy--> colostomy reversal-->incisional abdominal hernia--> wound dehiscence currently on hospice care presented to hospital for cc of leaking ascitic fluid from belly. pt being admitted for paracentesis    1) end stage liver disease/ liver cirrhosis/ ascitis/ leukocytosis/ Hyperbilirubinemia  - start rocephin 2g daily  - c/w hospice care on DC  - IR will take pt for paracentesis today  - surgery cs- s/p suturing of abdominal wall dehiscense    2) cad/ liver cirrhosis/ htn/ dm  - c/w current meds    3) dvt ppx  - will hold of on sc heparin for now in light of paracentesis procedure in am    4) dispo  - home hospice 52 yof with pmh of cad s/p cabg, end stage liver disease, liver cirrhosis 2/2 unknown etiology, diverticulitis-->fistula--> colostomy--> colostomy reversal-->incisional abdominal hernia--> wound dehiscence currently on hospice care presented to hospital for cc of leaking ascitic fluid from belly. pt being admitted for paracentesis    1) end stage liver disease/ liver cirrhosis/ ascitis/ leukocytosis/ Hyperbilirubinemia  - start rocephin 2g daily  - service switched to hospice   - IR was contacted by surgery and told not to do paracentesis due to small amount of fluid   - surgery following- s/p suturing of abdominal wall dehiscence    2) cad/ liver cirrhosis/ htn/ dm  - c/w current meds    3) dvt ppx  - will hold of on sc heparin for now.    4) dispo  - home hospice

## 2018-03-14 ENCOUNTER — APPOINTMENT (OUTPATIENT)
Dept: SURGERY | Facility: CLINIC | Age: 53
End: 2018-03-14

## 2018-03-14 LAB
ALBUMIN SERPL ELPH-MCNC: 1.9 G/DL — LOW (ref 3–5.5)
ALP SERPL-CCNC: 621 U/L — HIGH (ref 30–115)
ALT FLD-CCNC: 47 U/L — HIGH (ref 0–41)
ANION GAP SERPL CALC-SCNC: 7 MMOL/L — SIGNIFICANT CHANGE UP (ref 7–14)
AST SERPL-CCNC: 36 U/L — SIGNIFICANT CHANGE UP (ref 0–41)
BASOPHILS # BLD AUTO: 0.03 K/UL — SIGNIFICANT CHANGE UP (ref 0–0.2)
BASOPHILS NFR BLD AUTO: 0.2 % — SIGNIFICANT CHANGE UP (ref 0–1)
BILIRUB SERPL-MCNC: 8.2 MG/DL — HIGH (ref 0.2–1.2)
BUN SERPL-MCNC: 37 MG/DL — HIGH (ref 10–20)
CALCIUM SERPL-MCNC: 8.5 MG/DL — SIGNIFICANT CHANGE UP (ref 8.5–10.1)
CHLORIDE SERPL-SCNC: 101 MMOL/L — SIGNIFICANT CHANGE UP (ref 98–110)
CO2 SERPL-SCNC: 21 MMOL/L — SIGNIFICANT CHANGE UP (ref 17–32)
CREAT SERPL-MCNC: 1.4 MG/DL — SIGNIFICANT CHANGE UP (ref 0.7–1.5)
EOSINOPHIL # BLD AUTO: 0.27 K/UL — SIGNIFICANT CHANGE UP (ref 0–0.7)
EOSINOPHIL NFR BLD AUTO: 1.8 % — SIGNIFICANT CHANGE UP (ref 0–8)
GLUCOSE SERPL-MCNC: 187 MG/DL — HIGH (ref 70–110)
HCT VFR BLD CALC: 26.3 % — LOW (ref 37–47)
HGB BLD-MCNC: 8.8 G/DL — LOW (ref 12–16)
IMM GRANULOCYTES NFR BLD AUTO: 0.4 % — HIGH (ref 0.1–0.3)
LYMPHOCYTES # BLD AUTO: 50.7 % — SIGNIFICANT CHANGE UP (ref 20.5–51.1)
LYMPHOCYTES # BLD AUTO: 7.56 K/UL — HIGH (ref 1.2–3.4)
MCHC RBC-ENTMCNC: 31 PG — SIGNIFICANT CHANGE UP (ref 27–31)
MCHC RBC-ENTMCNC: 33.5 G/DL — SIGNIFICANT CHANGE UP (ref 32–37)
MCV RBC AUTO: 92.6 FL — SIGNIFICANT CHANGE UP (ref 81–99)
MONOCYTES # BLD AUTO: 0.54 K/UL — SIGNIFICANT CHANGE UP (ref 0.1–0.6)
MONOCYTES NFR BLD AUTO: 3.6 % — SIGNIFICANT CHANGE UP (ref 1.7–9.3)
NEUTROPHILS # BLD AUTO: 6.46 K/UL — SIGNIFICANT CHANGE UP (ref 1.4–6.5)
NEUTROPHILS NFR BLD AUTO: 43.3 % — SIGNIFICANT CHANGE UP (ref 42.2–75.2)
PLATELET # BLD AUTO: 132 K/UL — SIGNIFICANT CHANGE UP (ref 130–400)
POTASSIUM SERPL-MCNC: 4.7 MMOL/L — SIGNIFICANT CHANGE UP (ref 3.5–5)
POTASSIUM SERPL-SCNC: 4.7 MMOL/L — SIGNIFICANT CHANGE UP (ref 3.5–5)
PROT SERPL-MCNC: 4.3 G/DL — LOW (ref 6–8)
RBC # BLD: 2.84 M/UL — LOW (ref 4.2–5.4)
RBC # FLD: 13.3 % — SIGNIFICANT CHANGE UP (ref 11.5–14.5)
SODIUM SERPL-SCNC: 129 MMOL/L — LOW (ref 135–146)
WBC # BLD: 14.92 K/UL — HIGH (ref 4.8–10.8)
WBC # FLD AUTO: 14.92 K/UL — HIGH (ref 4.8–10.8)

## 2018-03-14 RX ORDER — HEPARIN SODIUM 5000 [USP'U]/ML
5000 INJECTION INTRAVENOUS; SUBCUTANEOUS EVERY 8 HOURS
Qty: 0 | Refills: 0 | Status: DISCONTINUED | OUTPATIENT
Start: 2018-03-14 | End: 2018-03-15

## 2018-03-14 RX ADMIN — Medication 3: at 17:34

## 2018-03-14 RX ADMIN — Medication 5 UNIT(S): at 17:34

## 2018-03-14 RX ADMIN — Medication 5 UNIT(S): at 08:24

## 2018-03-14 RX ADMIN — Medication 1: at 08:23

## 2018-03-14 RX ADMIN — Medication 81 MILLIGRAM(S): at 13:20

## 2018-03-14 RX ADMIN — Medication 1 MILLIGRAM(S): at 13:20

## 2018-03-14 RX ADMIN — CEFTRIAXONE 100 GRAM(S): 500 INJECTION, POWDER, FOR SOLUTION INTRAMUSCULAR; INTRAVENOUS at 05:17

## 2018-03-14 RX ADMIN — Medication 1 TABLET(S): at 13:23

## 2018-03-14 RX ADMIN — Medication 5 UNIT(S): at 13:22

## 2018-03-14 RX ADMIN — PANTOPRAZOLE SODIUM 40 MILLIGRAM(S): 20 TABLET, DELAYED RELEASE ORAL at 08:24

## 2018-03-14 RX ADMIN — Medication 3: at 13:22

## 2018-03-14 RX ADMIN — INSULIN GLARGINE 15 UNIT(S): 100 INJECTION, SOLUTION SUBCUTANEOUS at 22:27

## 2018-03-14 RX ADMIN — HEPARIN SODIUM 5000 UNIT(S): 5000 INJECTION INTRAVENOUS; SUBCUTANEOUS at 13:20

## 2018-03-14 NOTE — PROGRESS NOTE ADULT - SUBJECTIVE AND OBJECTIVE BOX
SUBJECTIVE:    Patient is a 52y old Female who presents with a chief complaint of leaking ascitic fluid from belly (12 Mar 2018 22:16)    Currently admitted to medicine with the primary diagnosis of Ascites     Today is hospital day 2d.     PAST MEDICAL & SURGICAL HISTORY  Psoriasis  HTN (hypertension)  CAD (coronary artery disease)  Diverticulitis  HTN (hypertension)  DM (diabetes mellitus)  Liver cirrhosis  Stented coronary artery  S/P CABG (coronary artery bypass graft)  CLL (chronic lymphocytic leukemia)  Hernia  S/P cystoscopy  H/O colostomy  S/P CABG (coronary artery bypass graft)  History of colon resection    SOCIAL HISTORY:  Negative for smoking/alcohol/drug use.     ALLERGIES:  No Known Allergies    MEDICATIONS:  STANDING MEDICATIONS  aspirin  chewable 81 milliGRAM(s) Oral daily  cefTRIAXone   IVPB 2 Gram(s) IV Intermittent every 24 hours  dextrose 5%. 1000 milliLiter(s) IV Continuous <Continuous>  dextrose 50% Injectable 12.5 Gram(s) IV Push once  dextrose 50% Injectable 25 Gram(s) IV Push once  dextrose 50% Injectable 25 Gram(s) IV Push once  folic acid 1 milliGRAM(s) Oral daily  furosemide    Tablet 40 milliGRAM(s) Oral daily  insulin glargine Injectable (LANTUS) 15 Unit(s) SubCutaneous at bedtime  insulin lispro (HumaLOG) corrective regimen sliding scale   SubCutaneous three times a day before meals  insulin lispro Injectable (HumaLOG) 5 Unit(s) SubCutaneous before breakfast  insulin lispro Injectable (HumaLOG) 5 Unit(s) SubCutaneous before lunch  insulin lispro Injectable (HumaLOG) 5 Unit(s) SubCutaneous before dinner  multivitamin 1 Tablet(s) Oral daily  pantoprazole    Tablet 40 milliGRAM(s) Oral before breakfast  spironolactone 50 milliGRAM(s) Oral daily    PRN MEDICATIONS  dextrose Gel 1 Dose(s) Oral once PRN  glucagon  Injectable 1 milliGRAM(s) IntraMuscular once PRN    VITALS:   T(C): 36 (18 @ 08:00), Max: 37.4 (18 @ 16:43)  HR: 93 (18 @ 08:00) (92 - 107)  BP: 91/52 (18 @ 08:00) (90/52 - 94/50)  RR: 18 (18 @ 08:00) (18 - 18)  SpO2: 100% (18 @ 16:43) (100% - 100%)      LABS:                        8.8    14.92 )-----------( 132      ( 14 Mar 2018 08:10 )             26.3         129<L>  |  101  |  37<H>  ----------------------------<  187<H>  4.7   |  21  |  1.4    Ca    8.5      14 Mar 2018 08:10    TPro  4.3<L>  /  Alb  1.9<L>  /  TBili  8.2<H>  /  DBili  x   /  AST  36  /  ALT  47<H>  /  AlkPhos  621<H>      PT/INR - ( 12 Mar 2018 18:38 )   PT: 12.20 sec;   INR: 1.13 ratio         PTT - ( 12 Mar 2018 18:38 )  PTT:25.5 sec  Urinalysis Basic - ( 12 Mar 2018 18:38 )    Color: Yellow / Appearance: Clear / S.010 / pH: x  Gluc: x / Ketone: Negative  / Bili: Negative / Urobili: 1.0 mg/dL   Blood: x / Protein: Negative mg/dL / Nitrite: Negative   Leuk Esterase: Trace / RBC: x / WBC 1-2 /HPF   Sq Epi: x / Non Sq Epi: x / Bacteria: See Note                Hemoglobin Trend:  Hemoglobin: 8.8 g/dL ( @ 08:10)  Hemoglobin: 9.6 g/dL ( @ 18:38)    WBC Count: 14.92 K/uL ( @ 08:10)  WBC Count: 14.10 K/uL ( @ 18:38)      RADIOLOGY:  RADIOLOGY RESULTS:    < from: US Abdomen Limited (18 @ 15:59) >  Trace ascites is noted.     < end of copied text >        PHYSICAL EXAM:  GEN: not in distress  LUNGS: CTA BL  HEART: rs1s2 no murmur  ABD: soft non tender distended , no fluid extravasation, abdominal binder in olce  EXT: + PP no LLE  NEURO: AAO3 SUBJECTIVE:    Patient is a 52y old Female who presents with a chief complaint of leaking ascitic fluid from belly (12 Mar 2018 22:16)    Currently admitted to medicine with the primary diagnosis of Ascites     Today is hospital day 2d.     PAST MEDICAL & SURGICAL HISTORY  Psoriasis  HTN (hypertension)  CAD (coronary artery disease)  Diverticulitis  HTN (hypertension)  DM (diabetes mellitus)  Liver cirrhosis  Stented coronary artery  S/P CABG (coronary artery bypass graft)  CLL (chronic lymphocytic leukemia)  Hernia  S/P cystoscopy  H/O colostomy  S/P CABG (coronary artery bypass graft)  History of colon resection    SOCIAL HISTORY:  Negative for smoking/alcohol/drug use.     ALLERGIES:  No Known Allergies    MEDICATIONS:  STANDING MEDICATIONS  aspirin  chewable 81 milliGRAM(s) Oral daily  cefTRIAXone   IVPB 2 Gram(s) IV Intermittent every 24 hours  dextrose 5%. 1000 milliLiter(s) IV Continuous <Continuous>  dextrose 50% Injectable 12.5 Gram(s) IV Push once  dextrose 50% Injectable 25 Gram(s) IV Push once  dextrose 50% Injectable 25 Gram(s) IV Push once  folic acid 1 milliGRAM(s) Oral daily  furosemide    Tablet 40 milliGRAM(s) Oral daily  insulin glargine Injectable (LANTUS) 15 Unit(s) SubCutaneous at bedtime  insulin lispro (HumaLOG) corrective regimen sliding scale   SubCutaneous three times a day before meals  insulin lispro Injectable (HumaLOG) 5 Unit(s) SubCutaneous before breakfast  insulin lispro Injectable (HumaLOG) 5 Unit(s) SubCutaneous before lunch  insulin lispro Injectable (HumaLOG) 5 Unit(s) SubCutaneous before dinner  multivitamin 1 Tablet(s) Oral daily  pantoprazole    Tablet 40 milliGRAM(s) Oral before breakfast  spironolactone 50 milliGRAM(s) Oral daily    PRN MEDICATIONS  dextrose Gel 1 Dose(s) Oral once PRN  glucagon  Injectable 1 milliGRAM(s) IntraMuscular once PRN    VITALS:   T(C): 36 (18 @ 08:00), Max: 37.4 (18 @ 16:43)  HR: 93 (18 @ 08:00) (92 - 107)  BP: 91/52 (18 @ 08:00) (90/52 - 94/50)  RR: 18 (18 @ 08:00) (18 - 18)  SpO2: 100% (18 @ 16:43) (100% - 100%)    PHYSICAL EXAM:  GEN: not in distress, icteric sclerae  LUNGS: CTA BL  HEART: rs1s2 no murmur  ABD: soft non tender distended , no fluid extravasation, abdominal binder in olce  EXT: + PP, no calf tenderness b/l, no edema  NEURO: AAO3       LABS:                        8.8    14.92 )-----------( 132      ( 14 Mar 2018 08:10 )             26.3         129<L>  |  101  |  37<H>  ----------------------------<  187<H>  4.7   |  21  |  1.4    Ca    8.5      14 Mar 2018 08:10    TPro  4.3<L>  /  Alb  1.9<L>  /  TBili  8.2<H>  /  DBili  x   /  AST  36  /  ALT  47<H>  /  AlkPhos  621<H>      PT/INR - ( 12 Mar 2018 18:38 )   PT: 12.20 sec;   INR: 1.13 ratio         PTT - ( 12 Mar 2018 18:38 )  PTT:25.5 sec  Urinalysis Basic - ( 12 Mar 2018 18:38 )    Color: Yellow / Appearance: Clear / S.010 / pH: x  Gluc: x / Ketone: Negative  / Bili: Negative / Urobili: 1.0 mg/dL   Blood: x / Protein: Negative mg/dL / Nitrite: Negative   Leuk Esterase: Trace / RBC: x / WBC 1-2 /HPF   Sq Epi: x / Non Sq Epi: x / Bacteria: See Note                Hemoglobin Trend:  Hemoglobin: 8.8 g/dL ( @ 08:10)  Hemoglobin: 9.6 g/dL ( @ 18:38)    WBC Count: 14.92 K/uL ( @ 08:10)  WBC Count: 14.10 K/uL ( @ 18:38)      RADIOLOGY:  RADIOLOGY RESULTS:    < from: US Abdomen Limited (18 @ 15:59) >  Trace ascites is noted.     < end of copied text >

## 2018-03-14 NOTE — PROGRESS NOTE ADULT - ASSESSMENT
?53 Y/O FEMALE W/ INCISIONAL HERNIA LEAKING ASCITES, WHO PRESENTED WITH RECURRENCE OF PREVIOUS CONCERN. RESTING COMFORTABLY WITH ABD BINDER IN PLACE. NO ACTIVE EXTRAVASATION OF ASCITIC FLUID S/P SUTURING OF ABDOMEN.    PLAN: F/U IR RECOMMENDATIONS, 4 QUADRANT US SHOWED TRACE ASCITIC FLUID. PRN SUTURING OF OPEN ABDOMINAL SITES. KEEP ABD BINDER IN PLACE.

## 2018-03-14 NOTE — PROGRESS NOTE ADULT - ATTENDING COMMENTS
Pt emotional.  Wants to go home.   Had leaked ascitics again.   Will return to suture.    Needs colloid for rustication .   Diuretics.   SBP prophylaxis
agree with above  recommend 4 quadrant ultrasound   if fluid we will drain later today or tomorrow.
pt known to me.   Doing better.  Still some leaking.   Continue high dose diuretics.   albumin /colloid for replacement of high volume ascitic drainage.   continue sbp prophylaxis.
Agree with resident's note, HPI, PE, assessment and plan.  Pt was seen and examined independently.  Note have reviewed and edited by me.

## 2018-03-14 NOTE — PROGRESS NOTE ADULT - SUBJECTIVE AND OBJECTIVE BOX
TOYA SERNA  52y Female   [id number]    Hospital Day: 3    Procedure: INCISIONAL HERNIA LEAKING ASCITES  Patient is a 52y old  Female who presents with a chief complaint of leaking ascitic fluid from belly (12 Mar 2018 22:16)    PAST MEDICAL & SURGICAL HISTORY:  Psoriasis  HTN (hypertension)  CAD (coronary artery disease)  Diverticulitis  HTN (hypertension)  DM (diabetes mellitus)  Liver cirrhosis  Stented coronary artery  S/P CABG (coronary artery bypass graft)  CLL (chronic lymphocytic leukemia)  Hernia  S/P cystoscopy  H/O colostomy  S/P CABG (coronary artery bypass graft)  History of colon resection      Events of the Last 24h:  Vital Signs Last 24 Hrs  T(C): 37.2 (13 Mar 2018 22:21), Max: 37.4 (13 Mar 2018 16:43)  T(F): 98.9 (13 Mar 2018 22:21), Max: 99.4 (13 Mar 2018 16:43)  HR: 101 (13 Mar 2018 22:21) (98 - 107)  BP: 94/50 (13 Mar 2018 22:21) (93/52 - 131/74)  BP(mean): --  RR: 18 (13 Mar 2018 22:21) (18 - 20)  SpO2: 100% (13 Mar 2018 16:43) (100% - 100%)        Diet, Regular:   Supplement Feeding Modality:  Oral  Ensure Enlive Cans or Servings Per Day:  1       Frequency:  Three Times a day (18 @ 23:09)  Diet, Regular:   Supplement Feeding Modality:  Oral  Ensure Enlive Cans or Servings Per Day:  1       Frequency:  Three Times a day (18 @ 10:24)      I&O's Summary   I&O's Detail      MEDICATIONS  (STANDING):  aspirin  chewable 81 milliGRAM(s) Oral daily  cefTRIAXone   IVPB 2 Gram(s) IV Intermittent every 24 hours  dextrose 5%. 1000 milliLiter(s) (50 mL/Hr) IV Continuous <Continuous>  dextrose 50% Injectable 12.5 Gram(s) IV Push once  dextrose 50% Injectable 25 Gram(s) IV Push once  dextrose 50% Injectable 25 Gram(s) IV Push once  folic acid 1 milliGRAM(s) Oral daily  furosemide    Tablet 40 milliGRAM(s) Oral daily  insulin glargine Injectable (LANTUS) 15 Unit(s) SubCutaneous at bedtime  insulin lispro (HumaLOG) corrective regimen sliding scale   SubCutaneous three times a day before meals  insulin lispro Injectable (HumaLOG) 5 Unit(s) SubCutaneous before breakfast  insulin lispro Injectable (HumaLOG) 5 Unit(s) SubCutaneous before lunch  insulin lispro Injectable (HumaLOG) 5 Unit(s) SubCutaneous before dinner  multivitamin 1 Tablet(s) Oral daily  pantoprazole    Tablet 40 milliGRAM(s) Oral before breakfast  spironolactone 50 milliGRAM(s) Oral daily    MEDICATIONS  (PRN):  dextrose Gel 1 Dose(s) Oral once PRN Blood Glucose LESS THAN 70 milliGRAM(s)/deciliter  glucagon  Injectable 1 milliGRAM(s) IntraMuscular once PRN Glucose LESS THAN 70 milligrams/deciliter      PHYSICAL EXAM:    GENERAL: NAD    HEENT: NCAT    CHEST/LUNGS: CTAB    HEART: RRR,  No murmurs, rubs, or gallops    ABDOMEN: DISTENDED ABDOMEN, ABD BINDER IN PLACE, NO APPARENT ACTIVE EXTRAVASATION OF ASCITIC FLUID, NON TENDER    EXTREMITIES:  FROM, No clubbing, cyanosis, or edema, palpable pulse    NEURO: No focal neurological deficits    SKIN: No rashes or lesions    INCISION/WOUNDS:                          9.6    14.10 )-----------( 137      ( 12 Mar 2018 18:38 )             28.9        CBC Full  -  ( 12 Mar 2018 18:38 )  WBC Count : 14.10 K/uL  Hemoglobin : 9.6 g/dL  Hematocrit : 28.9 %  Platelet Count - Automated : 137 K/uL  Mean Cell Volume : 95.7 fL  Mean Cell Hemoglobin : 31.8 pg  Mean Cell Hemoglobin Concentration : 33.2 g/dL  Auto Neutrophil # : 7.02 K/uL  Auto Lymphocyte # : 6.12 K/uL  Auto Monocyte # : 0.69 K/uL  Auto Eosinophil # : 0.17 K/uL  Auto Basophil # : 0.04 K/uL  Auto Neutrophil % : 49.8 %  Auto Lymphocyte % : 43.4 %  Auto Monocyte % : 4.9 %  Auto Eosinophil % : 1.2 %  Auto Basophil % : 0.3 %               131   |  99    |  23                 Ca: 9.1    BMP:   ----------------------------< 322    Mg: x     (18 @ 18:38)             4.7    |  22    | 1.0                Ph: x        LFT:     TPro: 5.3 / Alb: 2.6 / TBili: 7.6 / DBili: x / AST: 59 / ALT: 60 / AlkPhos: 906   (18 @ 18:38)    LIVER FUNCTIONS - ( 12 Mar 2018 18:38 )  Alb: 2.6 g/dL / Pro: 5.3 g/dL / ALK PHOS: 906 U/L / ALT: 60 U/L / AST: 59 U/L / GGT: x           PT/INR - ( 12 Mar 2018 18:38 )   PT: 12.20 sec;   INR: 1.13 ratio         PTT - ( 12 Mar 2018 18:38 )  PTT:25.5 sec      Urinalysis Basic - ( 12 Mar 2018 18:38 )    Color: Yellow / Appearance: Clear / S.010 / pH: x  Gluc: x / Ketone: Negative  / Bili: Negative / Urobili: 1.0 mg/dL   Blood: x / Protein: Negative mg/dL / Nitrite: Negative   Leuk Esterase: Trace / RBC: x / WBC 1-2 /HPF   Sq Epi: x / Non Sq Epi: x / Bacteria: See Note          IMAGING: < from: US Abdomen Limited (18 @ 15:59) >  Trace ascites is noted.     < end of copied text >          SPECTRA: [id number]

## 2018-03-14 NOTE — PROGRESS NOTE ADULT - ASSESSMENT
53 yo f with pmh of cad s/p cabg, end stage liver disease, liver cirrhosis 2/2 unknown etiology, diverticulitis-->fistula--> colostomy--> colostomy reversal-->incisional abdominal hernia--> wound dehiscence currently on hospice care presented to hospital for cc of leaking ascitic fluid from belly.    1) end stage liver disease/ liver cirrhosis/ ascitis/ leukocytosis/ Hyperbilirubinemia  - c/w rocephin 2g daily  - c/w hospice care on DC  - surgery cs- s/p suturing of abdominal wall dehiscense/ resuture as needed    2) cad/ liver cirrhosis/ htn/ dm  - c/w current meds    3) dvt ppx  - resume heparin sq 5K q8    4) dispo  - home hospice 51 yo f with pmh of cad s/p cabg, end stage liver disease, liver cirrhosis 2/2 unknown etiology, diverticulitis-->fistula--> colostomy--> colostomy reversal-->incisional abdominal hernia--> wound dehiscence currently on hospice care presented to hospital for cc of leaking ascitic fluid from belly.    1) end stage liver disease/ liver cirrhosis/ ascitis/ leukocytosis/ Hyperbilirubinemia  - c/w sbp ppx  - c/w hospice care on DC  - surgery cs- s/p suturing of abdominal wall dihescense/ resuture as needed  - IVF to avoid hepatorenal syndrome    2) cad/ liver cirrhosis/ htn/ dm  - c/w current meds    3) dvt ppx  - resume heparin sq 5K q8    4) dispo  - home hospice 53 yo f with pmh of cad s/p cabg, end stage liver disease, liver cirrhosis 2/2 unknown etiology, diverticulitis-->fistula--> colostomy--> colostomy reversal-->incisional abdominal hernia--> wound dehiscence currently on hospice care presented to hospital for cc of leaking ascitic fluid from belly.    1) end stage liver disease/ liver cirrhosis/ ascitis/ leukocytosis/ Hyperbilirubinemia  - c/w sbp ppx  - c/w hospice care on DC  - surgery cs- s/p suturing of abdominal wall dihescense/ resuture as needed  - continue ABx for SBP ppx     2) cad/ liver cirrhosis/ htn/ dm  - c/w current meds    3) Hyponatremia, chronic, asymptomatic, likely due to end stage liver disease.    4)  dvt ppx  - resume heparin sq 5K q8    dispo  - home hospice  - anticipated Dc tomorrow 51 yo f with pmh of cad s/p cabg, end stage liver disease, liver cirrhosis 2/2 unknown etiology, diverticulitis-->fistula--> colostomy--> colostomy reversal-->incisional abdominal hernia--> wound dehiscence currently on hospice care presented to hospital for cc of leaking ascitic fluid from belly.    1) end stage liver disease/decompensated liver cirrhosis/ ascitis/ Hyperbilirubinemia  - c/w sbp ppx  - c/w hospice care on DC  - surgery cs- s/p suturing of abdominal wall dihescense/ resuture as needed  - dc fluids    2) cad/ liver cirrhosis/ htn/ dm  - c/w current meds    3) Hyponatremia, chronic, asymptomatic, likely due to end stage liver disease.    4)  Leucocytosis  - no signs of infection  - UA negative, BCx sent    dvt ppx  - resume heparin sq 5K q8    dispo  - home hospice  - anticipated Dc tomorrow

## 2018-03-15 ENCOUNTER — TRANSCRIPTION ENCOUNTER (OUTPATIENT)
Age: 53
End: 2018-03-15

## 2018-03-15 VITALS — WEIGHT: 88.18 LBS

## 2018-03-15 LAB
ANION GAP SERPL CALC-SCNC: 10 MMOL/L — SIGNIFICANT CHANGE UP (ref 7–14)
BUN SERPL-MCNC: 48 MG/DL — HIGH (ref 10–20)
CALCIUM SERPL-MCNC: 8.7 MG/DL — SIGNIFICANT CHANGE UP (ref 8.5–10.1)
CHLORIDE SERPL-SCNC: 99 MMOL/L — SIGNIFICANT CHANGE UP (ref 98–110)
CO2 SERPL-SCNC: 20 MMOL/L — SIGNIFICANT CHANGE UP (ref 17–32)
CREAT SERPL-MCNC: 1.3 MG/DL — SIGNIFICANT CHANGE UP (ref 0.7–1.5)
GLUCOSE SERPL-MCNC: 124 MG/DL — HIGH (ref 70–110)
HCT VFR BLD CALC: 25.7 % — LOW (ref 37–47)
HGB BLD-MCNC: 8.5 G/DL — LOW (ref 12–16)
MAGNESIUM SERPL-MCNC: 2.2 MG/DL — SIGNIFICANT CHANGE UP (ref 1.8–2.4)
MCHC RBC-ENTMCNC: 30.7 PG — SIGNIFICANT CHANGE UP (ref 27–31)
MCHC RBC-ENTMCNC: 33.1 G/DL — SIGNIFICANT CHANGE UP (ref 32–37)
MCV RBC AUTO: 92.8 FL — SIGNIFICANT CHANGE UP (ref 81–99)
NRBC # BLD: 0 /100 WBCS — SIGNIFICANT CHANGE UP (ref 0–0)
PHOSPHATE SERPL-MCNC: 4.2 MG/DL — SIGNIFICANT CHANGE UP (ref 2.1–4.9)
PLATELET # BLD AUTO: 143 K/UL — SIGNIFICANT CHANGE UP (ref 130–400)
POTASSIUM SERPL-MCNC: 3.9 MMOL/L — SIGNIFICANT CHANGE UP (ref 3.5–5)
POTASSIUM SERPL-SCNC: 3.9 MMOL/L — SIGNIFICANT CHANGE UP (ref 3.5–5)
RBC # BLD: 2.77 M/UL — LOW (ref 4.2–5.4)
RBC # FLD: 13.6 % — SIGNIFICANT CHANGE UP (ref 11.5–14.5)
SODIUM SERPL-SCNC: 129 MMOL/L — LOW (ref 135–146)
WBC # BLD: 18.39 K/UL — HIGH (ref 4.8–10.8)
WBC # FLD AUTO: 18.39 K/UL — HIGH (ref 4.8–10.8)

## 2018-03-15 RX ADMIN — CEFTRIAXONE 100 GRAM(S): 500 INJECTION, POWDER, FOR SOLUTION INTRAMUSCULAR; INTRAVENOUS at 06:29

## 2018-03-15 RX ADMIN — Medication 1: at 08:29

## 2018-03-15 RX ADMIN — PANTOPRAZOLE SODIUM 40 MILLIGRAM(S): 20 TABLET, DELAYED RELEASE ORAL at 08:31

## 2018-03-15 RX ADMIN — Medication 5 UNIT(S): at 08:30

## 2018-03-15 NOTE — PROGRESS NOTE ADULT - NSHPATTENDINGPLANDISCUSS_GEN_ALL_CORE
residents, nursing, , patient
patient and surgical team.
pt and family
residents, nursing, , patient

## 2018-03-15 NOTE — DISCHARGE NOTE ADULT - CARE PLAN
Principal Discharge DX:	Other ascites  Goal:	medical therapy  Assessment and plan of treatment:	c/w diuretics, f/u with PMD, gen surg, IR for drainage of ascitis as needed for increased abdominal girth  Secondary Diagnosis:	CLL (chronic lymphocytic leukemia)  Goal:	hospice care  Assessment and plan of treatment:	continue hospice care at home  Secondary Diagnosis:	Coronary artery disease involving native heart without angina pectoris, unspecified vessel or lesion type  Goal:	medical therapy  Assessment and plan of treatment:	c/w meds, f/u with PMD

## 2018-03-15 NOTE — DISCHARGE NOTE ADULT - MEDICATION SUMMARY - MEDICATIONS TO TAKE
I will START or STAY ON the medications listed below when I get home from the hospital:    spironolactone  -- 50 milligram(s) by mouth once a day  -- Indication: For Liver cirrhosis    aspirin 81 mg oral tablet, chewable  -- 1 tab(s) by mouth once a day  -- Indication: For CAD (coronary artery disease)    LORazepam 1 mg oral tablet  -- Indication: For Anxiety    HumaLOG  -- Indication: For DM2    Levemir 100 units/mL subcutaneous solution  -- Indication: For DM (diabetes mellitus)    furosemide 40 mg oral tablet  -- 1 tab(s) by mouth once a day  -- Indication: For Liver cirrhosis    docusate  -- Indication: For Constipation    potassium chloride  -- Indication: For Diuretics    pantoprazole  -- Indication: For gerd    Multiple Vitamins oral tablet  -- 1 tab(s) by mouth once a day  -- Indication: For Supplement    folic acid  -- 1 milligram(s) by mouth once a day  -- Indication: For Supplement

## 2018-03-15 NOTE — DISCHARGE NOTE ADULT - PLAN OF CARE
medical therapy c/w diuretics, f/u with PMD, gen surg, IR for drainage of ascitis as needed for increased abdominal girth hospice care continue hospice care at home c/w meds, f/u with PMD

## 2018-03-15 NOTE — PROGRESS NOTE ADULT - ASSESSMENT
1) end stage liver disease/decompensated liver cirrhosis/ ascitis/ Hyperbilirubinemia  - c/w sbp ppx with Cipro for 7 more days PO  - c/w hospice care   - OP f/u with IR in 10 days for ascitic fluid drainage.     2) cad/ liver cirrhosis/ htn/ dm  - c/w current meds    3) Hyponatremia, chronic, asymptomatic, likely due to end stage liver disease.    4)  Leucocytosis  - no signs of infection, remains afebrile, will continue SBP ppx with ABx, pt instructed to come back to ER if febrile or abdominal pain.    Pt is clinically stable for discharge home with home hospice today.

## 2018-03-15 NOTE — DISCHARGE NOTE ADULT - HOSPITAL COURSE
53 yo f with pmh of cad s/p cabg, end stage liver disease, liver cirrhosis 2/2 unknown etiology, diverticulitis-->fistula--> colostomy--> colostomy reversal-->incisional abdominal hernia--> wound dehiscence currently on hospice care presented to hospital for cc of leaking ascitic fluid from belly. For end stage liver disease/decompensated liver cirrhosis/ ascitis/ Hyperbilirubinemia she was planned for IR drainage but during preparation for procedure had spontaeous drainage of ascitis from fluid, The wound was sutured again. Pt recieved rociphen as sbp ppx, she resumed feeds without discomfort, had BM, continued with diuresis. hyponatremia, chronic, asymptomatic, likely due to end stage liver disease. Leucocytosis, pt had no signs of infection , UA negative, BCx sent. f/u as outpatient. 51 yo f with pmh of cad s/p cabg, end stage liver disease, liver cirrhosis 2/2 unknown etiology, diverticulitis-->fistula--> colostomy--> colostomy reversal-->incisional abdominal hernia--> wound dehiscence currently on hospice care presented to hospital for cc of leaking ascitic fluid from abdominal wall wound. For end stage liver disease/decompensated liver cirrhosis/ ascitis/ Hyperbilirubinemia she was planned for IR drainage but during preparation for procedure had spontaneous drainage of ascitic fluid. The wound was sutured again by surgery. Pt received Rocephin as sbp ppx, she resumed feeds without discomfort, had BM, continued with diuresis. Hyponatremia, chronic, asymptomatic, likely due to end stage liver disease. Leucocytosis, pt had no signs of infection , UA negative, BCx sent. f/u as outpatient, pt instructed to go to ER or see the doctor if develops fever or abdominal pain. Pt discharged with PO Cipro for 7 more days. Hospice care reinstated.

## 2018-03-15 NOTE — DISCHARGE NOTE ADULT - CARE PROVIDER_API CALL
Yaima Bergman (CARLOTA), Surgery  [address] [place] [address]  Phone: [phone]  Fax: [phone]    Alex Kirk), Internal Medicine  [address] [place] [address]  Phone: [phone]  Fax: [phone]    Joyce Villeda), Radiology Physicians  [address] [place] [address]  Phone: [phone]  Fax: [phone]

## 2018-03-15 NOTE — PROGRESS NOTE ADULT - SUBJECTIVE AND OBJECTIVE BOX
TOYA SERNA    53 yo f with pmh of cad s/p cabg, end stage liver disease, liver cirrhosis 2/2 unknown etiology, wound dehiscence after multiple laparotomies, currently on hospice care presented to hospital for cc of leaking ascitic fluid from abdominal wound. Pt underwent re-suturing by Surgery yesterday.    INTERVAL HPI/OVERNIGHT EVENTS: no events, no more leaking of ascitic fluid    PHYSICAL EXAM:  T(C): 35.9, Max: 36.4 (03-15-18 @ 00:30)  HR: 78 (78 - 89)  BP: 112/58 (98/53 - 112/58)  RR: 18 (18 - 18)  SpO2: --    GEN: not in distress, icteric sclerae  LUNGS: CTA BL  HEART: rs1s2 no murmur  ABD: soft, non tender, distended , no fluid extravasation, abdominal binder in place  EXT: + PP, no calf tenderness b/l, no edema  NEURO: AAO3     LABS:                        8.5    18.39 )-----------( 143      ( 15 Mar 2018 05:45 )             25.7     03-15    129<L>  |  99  |  48<H>  ----------------------------<  124<H>  3.9   |  20  |  1.3    Ca    8.7      15 Mar 2018 05:45  Phos  4.2     03-15  Mg     2.2     03-15    TPro  4.3<L>  /  Alb  1.9<L>  /  TBili  8.2<H>  /  DBili  x   /  AST  36  /  ALT  47<H>  /  AlkPhos  621<H>  03-14    CAPILLARY BLOOD GLUCOSE  181 (15 Mar 2018 08:00)  241 (14 Mar 2018 22:00)  298 (14 Mar 2018 17:00)    MEDICATIONS  (STANDING):  aspirin  chewable 81 milliGRAM(s) Oral daily  cefTRIAXone   IVPB 2 Gram(s) IV Intermittent every 24 hours  dextrose 50% Injectable 12.5 Gram(s) IV Push once  dextrose 50% Injectable 25 Gram(s) IV Push once  dextrose 50% Injectable 25 Gram(s) IV Push once  folic acid 1 milliGRAM(s) Oral daily  furosemide    Tablet 40 milliGRAM(s) Oral daily  heparin  Injectable 5000 Unit(s) SubCutaneous every 8 hours  insulin glargine Injectable (LANTUS) 15 Unit(s) SubCutaneous at bedtime  insulin lispro (HumaLOG) corrective regimen sliding scale   SubCutaneous three times a day before meals  insulin lispro Injectable (HumaLOG) 5 Unit(s) SubCutaneous before breakfast  insulin lispro Injectable (HumaLOG) 5 Unit(s) SubCutaneous before lunch  insulin lispro Injectable (HumaLOG) 5 Unit(s) SubCutaneous before dinner  multivitamin 1 Tablet(s) Oral daily  pantoprazole    Tablet 40 milliGRAM(s) Oral before breakfast  spironolactone 50 milliGRAM(s) Oral daily    MEDICATIONS  (PRN):  dextrose Gel 1 Dose(s) Oral once PRN Blood Glucose LESS THAN 70 milliGRAM(s)/deciliter  glucagon  Injectable 1 milliGRAM(s) IntraMuscular once PRN Glucose LESS THAN 70 milligrams/deciliter

## 2018-03-15 NOTE — DISCHARGE NOTE ADULT - PATIENT PORTAL LINK FT
You can access the [facility], offered by [facility], by registering with the following website: [email or web address]

## 2018-03-15 NOTE — DISCHARGE NOTE ADULT - SECONDARY DIAGNOSIS.
CLL (chronic lymphocytic leukemia) Coronary artery disease involving native heart without angina pectoris, unspecified vessel or lesion type

## 2018-03-16 PROBLEM — K46.9 UNSPECIFIED ABDOMINAL HERNIA WITHOUT OBSTRUCTION OR GANGRENE: Chronic | Status: ACTIVE | Noted: 2018-03-12

## 2018-03-16 PROBLEM — E11.9 TYPE 2 DIABETES MELLITUS WITHOUT COMPLICATIONS: Chronic | Status: ACTIVE | Noted: 2018-03-12

## 2018-03-16 PROBLEM — I25.10 ATHEROSCLEROTIC HEART DISEASE OF NATIVE CORONARY ARTERY WITHOUT ANGINA PECTORIS: Chronic | Status: ACTIVE | Noted: 2018-03-12

## 2018-03-16 PROBLEM — K74.60 UNSPECIFIED CIRRHOSIS OF LIVER: Chronic | Status: ACTIVE | Noted: 2018-03-12

## 2018-03-16 PROBLEM — I10 ESSENTIAL (PRIMARY) HYPERTENSION: Chronic | Status: ACTIVE | Noted: 2018-03-12

## 2018-03-16 PROBLEM — Z95.5 PRESENCE OF CORONARY ANGIOPLASTY IMPLANT AND GRAFT: Chronic | Status: ACTIVE | Noted: 2018-03-12

## 2018-03-16 PROBLEM — Z95.1 PRESENCE OF AORTOCORONARY BYPASS GRAFT: Chronic | Status: ACTIVE | Noted: 2018-03-12

## 2018-03-16 PROBLEM — C91.10 CHRONIC LYMPHOCYTIC LEUKEMIA OF B-CELL TYPE NOT HAVING ACHIEVED REMISSION: Chronic | Status: ACTIVE | Noted: 2018-03-12

## 2018-03-16 PROBLEM — L40.9 PSORIASIS, UNSPECIFIED: Chronic | Status: ACTIVE | Noted: 2018-03-12

## 2018-03-16 PROBLEM — K57.92 DIVERTICULITIS OF INTESTINE, PART UNSPECIFIED, WITHOUT PERFORATION OR ABSCESS WITHOUT BLEEDING: Chronic | Status: ACTIVE | Noted: 2018-03-12

## 2018-03-20 DIAGNOSIS — I10 ESSENTIAL (PRIMARY) HYPERTENSION: ICD-10-CM

## 2018-03-20 DIAGNOSIS — E11.9 TYPE 2 DIABETES MELLITUS WITHOUT COMPLICATIONS: ICD-10-CM

## 2018-03-20 DIAGNOSIS — E87.1 HYPO-OSMOLALITY AND HYPONATREMIA: ICD-10-CM

## 2018-03-20 DIAGNOSIS — Z95.1 PRESENCE OF AORTOCORONARY BYPASS GRAFT: ICD-10-CM

## 2018-03-20 DIAGNOSIS — C91.10 CHRONIC LYMPHOCYTIC LEUKEMIA OF B-CELL TYPE NOT HAVING ACHIEVED REMISSION: ICD-10-CM

## 2018-03-20 DIAGNOSIS — K74.60 UNSPECIFIED CIRRHOSIS OF LIVER: ICD-10-CM
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
## 2018-03-20 DIAGNOSIS — Z79.4 LONG TERM (CURRENT) USE OF INSULIN: ICD-10-CM

## 2018-03-20 DIAGNOSIS — Z90.49 ACQUIRED ABSENCE OF OTHER SPECIFIED PARTS OF DIGESTIVE TRACT: ICD-10-CM

## 2018-03-20 DIAGNOSIS — R18.8 OTHER ASCITES: ICD-10-CM

## 2018-03-20 DIAGNOSIS — K76.6 PORTAL HYPERTENSION: ICD-10-CM

## 2018-03-20 DIAGNOSIS — T81.31XA DISRUPTION OF EXTERNAL OPERATION (SURGICAL) WOUND, NOT ELSEWHERE CLASSIFIED, INITIAL ENCOUNTER: ICD-10-CM

## 2018-03-20 DIAGNOSIS — Z95.5 PRESENCE OF CORONARY ANGIOPLASTY IMPLANT AND GRAFT: ICD-10-CM

## 2018-03-20 DIAGNOSIS — E80.6 OTHER DISORDERS OF BILIRUBIN METABOLISM: ICD-10-CM

## 2018-03-20 DIAGNOSIS — Z51.5 ENCOUNTER FOR PALLIATIVE CARE: ICD-10-CM

## 2018-03-20 DIAGNOSIS — I25.10 ATHEROSCLEROTIC HEART DISEASE OF NATIVE CORONARY ARTERY WITHOUT ANGINA PECTORIS: ICD-10-CM

## 2018-03-20 DIAGNOSIS — K72.90 HEPATIC FAILURE, UNSPECIFIED WITHOUT COMA: ICD-10-CM

## 2018-03-20 LAB
CULTURE RESULTS: SIGNIFICANT CHANGE UP
CULTURE RESULTS: SIGNIFICANT CHANGE UP
SPECIMEN SOURCE: SIGNIFICANT CHANGE UP
SPECIMEN SOURCE: SIGNIFICANT CHANGE UP

## 2018-03-23 ENCOUNTER — APPOINTMENT (OUTPATIENT)
Dept: SURGERY | Facility: CLINIC | Age: 53
End: 2018-03-23
Payer: MEDICARE

## 2018-03-23 ENCOUNTER — INPATIENT (INPATIENT)
Facility: HOSPITAL | Age: 53
LOS: 4 days | Discharge: HOSPICE | End: 2018-03-28
Attending: INTERNAL MEDICINE | Admitting: INTERNAL MEDICINE

## 2018-03-23 VITALS
DIASTOLIC BLOOD PRESSURE: 66 MMHG | HEART RATE: 76 BPM | TEMPERATURE: 98 F | RESPIRATION RATE: 18 BRPM | SYSTOLIC BLOOD PRESSURE: 112 MMHG | OXYGEN SATURATION: 98 %

## 2018-03-23 DIAGNOSIS — Z98.890 OTHER SPECIFIED POSTPROCEDURAL STATES: Chronic | ICD-10-CM

## 2018-03-23 DIAGNOSIS — N32.2 VESICAL FISTULA, NOT ELSEWHERE CLASSIFIED: ICD-10-CM

## 2018-03-23 DIAGNOSIS — Z95.1 PRESENCE OF AORTOCORONARY BYPASS GRAFT: Chronic | ICD-10-CM

## 2018-03-23 LAB
ANION GAP SERPL CALC-SCNC: 16 MMOL/L — HIGH (ref 7–14)
BUN SERPL-MCNC: 34 MG/DL — HIGH (ref 10–20)
CALCIUM SERPL-MCNC: 8.7 MG/DL — SIGNIFICANT CHANGE UP (ref 8.5–10.1)
CHLORIDE SERPL-SCNC: 97 MMOL/L — LOW (ref 98–110)
CO2 SERPL-SCNC: 20 MMOL/L — SIGNIFICANT CHANGE UP (ref 17–32)
CREAT SERPL-MCNC: 0.8 MG/DL — SIGNIFICANT CHANGE UP (ref 0.7–1.5)
GLUCOSE SERPL-MCNC: 163 MG/DL — HIGH (ref 70–99)
HCT VFR BLD CALC: 30.5 % — LOW (ref 37–47)
HGB BLD-MCNC: 9.8 G/DL — LOW (ref 12–16)
MCHC RBC-ENTMCNC: 29 PG — SIGNIFICANT CHANGE UP (ref 27–31)
MCHC RBC-ENTMCNC: 32.1 G/DL — SIGNIFICANT CHANGE UP (ref 32–37)
MCV RBC AUTO: 90.2 FL — SIGNIFICANT CHANGE UP (ref 81–99)
NRBC # BLD: 0 /100 WBCS — SIGNIFICANT CHANGE UP (ref 0–0)
PLATELET # BLD AUTO: 154 K/UL — SIGNIFICANT CHANGE UP (ref 130–400)
POTASSIUM SERPL-MCNC: 4.9 MMOL/L — SIGNIFICANT CHANGE UP (ref 3.5–5)
POTASSIUM SERPL-SCNC: 4.9 MMOL/L — SIGNIFICANT CHANGE UP (ref 3.5–5)
RBC # BLD: 3.38 M/UL — LOW (ref 4.2–5.4)
RBC # FLD: 13.4 % — SIGNIFICANT CHANGE UP (ref 11.5–14.5)
SODIUM SERPL-SCNC: 133 MMOL/L — LOW (ref 135–146)
WBC # BLD: 20.72 K/UL — HIGH (ref 4.8–10.8)
WBC # FLD AUTO: 20.72 K/UL — HIGH (ref 4.8–10.8)

## 2018-03-23 PROCEDURE — 99214 OFFICE O/P EST MOD 30 MIN: CPT

## 2018-03-23 RX ORDER — SODIUM CHLORIDE 9 MG/ML
1000 INJECTION INTRAMUSCULAR; INTRAVENOUS; SUBCUTANEOUS
Qty: 0 | Refills: 0 | Status: DISCONTINUED | OUTPATIENT
Start: 2018-03-23 | End: 2018-03-24

## 2018-03-23 RX ADMIN — SODIUM CHLORIDE 80 MILLILITER(S): 9 INJECTION INTRAMUSCULAR; INTRAVENOUS; SUBCUTANEOUS at 19:56

## 2018-03-23 NOTE — ED PROVIDER NOTE - PROGRESS NOTE DETAILS
attempt to d/c pt but when walked to BR, leaking from wound with significant drainage from wound. sx notified and repaired after d/w Dr. fernandez, [facility] hospice zahra [facility]ris  with hospice line, will admit to medicine and hospice/sx consult.

## 2018-03-23 NOTE — ED PROVIDER NOTE - NS ED ROS FT
Constitutional: No fever, chills.  Eyes: No visual changes, eye pain or discharge.  ENMT: No hearing changes, pain, discharge or infections.  Cardiac: No chest pain, SOB or edema.  Respiratory: No cough or respiratory distress.   GI: No nausea, vomiting, diarrhea or abdominal pain.  : No dysuria, frequency or burning.  MS: No myalgia, muscle weakness, joint pain or back pain.  Neuro: No headache or weakness. No LOC.  Skin: No skin rash.   Except as documented in the HPI, all other systems are negative.

## 2018-03-24 LAB
ALBUMIN SERPL ELPH-MCNC: 3 G/DL — LOW (ref 3.5–5.2)
ALP SERPL-CCNC: >1200 U/L — HIGH (ref 30–115)
ALT FLD-CCNC: 218 U/L — HIGH (ref 0–41)
ANION GAP SERPL CALC-SCNC: 16 MMOL/L — HIGH (ref 7–14)
APTT BLD: 25.9 SEC — LOW (ref 27–39.2)
AST SERPL-CCNC: 172 U/L — HIGH (ref 0–41)
BILIRUB SERPL-MCNC: 5.6 MG/DL — HIGH (ref 0.2–1.2)
BUN SERPL-MCNC: 24 MG/DL — HIGH (ref 10–20)
CALCIUM SERPL-MCNC: 8.2 MG/DL — LOW (ref 8.5–10.1)
CHLORIDE SERPL-SCNC: 99 MMOL/L — SIGNIFICANT CHANGE UP (ref 98–110)
CO2 SERPL-SCNC: 21 MMOL/L — SIGNIFICANT CHANGE UP (ref 17–32)
CREAT SERPL-MCNC: 0.8 MG/DL — SIGNIFICANT CHANGE UP (ref 0.7–1.5)
GLUCOSE SERPL-MCNC: 164 MG/DL — HIGH (ref 70–99)
HCT VFR BLD CALC: 29.1 % — LOW (ref 37–47)
HGB BLD-MCNC: 9.2 G/DL — LOW (ref 12–16)
INR BLD: 1.05 RATIO — SIGNIFICANT CHANGE UP (ref 0.65–1.3)
MCHC RBC-ENTMCNC: 28.8 PG — SIGNIFICANT CHANGE UP (ref 27–31)
MCHC RBC-ENTMCNC: 31.6 G/DL — LOW (ref 32–37)
MCV RBC AUTO: 91.2 FL — SIGNIFICANT CHANGE UP (ref 81–99)
NRBC # BLD: 0 /100 WBCS — SIGNIFICANT CHANGE UP (ref 0–0)
PLATELET # BLD AUTO: 134 K/UL — SIGNIFICANT CHANGE UP (ref 130–400)
POTASSIUM SERPL-MCNC: 4 MMOL/L — SIGNIFICANT CHANGE UP (ref 3.5–5)
POTASSIUM SERPL-SCNC: 4 MMOL/L — SIGNIFICANT CHANGE UP (ref 3.5–5)
PROT SERPL-MCNC: 4.9 G/DL — LOW (ref 6–8)
PROTHROM AB SERPL-ACNC: 11.4 SEC — SIGNIFICANT CHANGE UP (ref 9.95–12.87)
RBC # BLD: 3.19 M/UL — LOW (ref 4.2–5.4)
RBC # FLD: 13.6 % — SIGNIFICANT CHANGE UP (ref 11.5–14.5)
SODIUM SERPL-SCNC: 136 MMOL/L — SIGNIFICANT CHANGE UP (ref 135–146)
WBC # BLD: 15.66 K/UL — HIGH (ref 4.8–10.8)
WBC # FLD AUTO: 15.66 K/UL — HIGH (ref 4.8–10.8)

## 2018-03-24 RX ORDER — HEPARIN SODIUM 5000 [USP'U]/ML
5000 INJECTION INTRAVENOUS; SUBCUTANEOUS EVERY 8 HOURS
Qty: 0 | Refills: 0 | Status: DISCONTINUED | OUTPATIENT
Start: 2018-03-24 | End: 2018-03-24

## 2018-03-24 RX ORDER — HEPARIN SODIUM 5000 [USP'U]/ML
5000 INJECTION INTRAVENOUS; SUBCUTANEOUS EVERY 12 HOURS
Qty: 0 | Refills: 0 | Status: DISCONTINUED | OUTPATIENT
Start: 2018-03-24 | End: 2018-03-28

## 2018-03-24 RX ORDER — PANTOPRAZOLE SODIUM 20 MG/1
40 TABLET, DELAYED RELEASE ORAL
Qty: 0 | Refills: 0 | Status: DISCONTINUED | OUTPATIENT
Start: 2018-03-24 | End: 2018-03-28

## 2018-03-24 RX ORDER — INSULIN GLARGINE 100 [IU]/ML
9 INJECTION, SOLUTION SUBCUTANEOUS EVERY MORNING
Qty: 0 | Refills: 0 | Status: DISCONTINUED | OUTPATIENT
Start: 2018-03-24 | End: 2018-03-28

## 2018-03-24 RX ORDER — FOLIC ACID 0.8 MG
1 TABLET ORAL DAILY
Qty: 0 | Refills: 0 | Status: DISCONTINUED | OUTPATIENT
Start: 2018-03-24 | End: 2018-03-28

## 2018-03-24 RX ORDER — CIPROFLOXACIN LACTATE 400MG/40ML
500 VIAL (ML) INTRAVENOUS
Qty: 0 | Refills: 0 | Status: DISCONTINUED | OUTPATIENT
Start: 2018-03-24 | End: 2018-03-28

## 2018-03-24 RX ORDER — HYDROXYZINE HCL 10 MG
25 TABLET ORAL
Qty: 0 | Refills: 0 | Status: DISCONTINUED | OUTPATIENT
Start: 2018-03-24 | End: 2018-03-28

## 2018-03-24 RX ORDER — SPIRONOLACTONE 25 MG/1
50 TABLET, FILM COATED ORAL DAILY
Qty: 0 | Refills: 0 | Status: DISCONTINUED | OUTPATIENT
Start: 2018-03-24 | End: 2018-03-26

## 2018-03-24 RX ORDER — FUROSEMIDE 40 MG
40 TABLET ORAL DAILY
Qty: 0 | Refills: 0 | Status: DISCONTINUED | OUTPATIENT
Start: 2018-03-24 | End: 2018-03-24

## 2018-03-24 RX ORDER — CIPROFLOXACIN LACTATE 400MG/40ML
500 VIAL (ML) INTRAVENOUS EVERY 12 HOURS
Qty: 0 | Refills: 0 | Status: DISCONTINUED | OUTPATIENT
Start: 2018-03-24 | End: 2018-03-24

## 2018-03-24 RX ORDER — CHOLESTYRAMINE 4 G/9G
4 POWDER, FOR SUSPENSION ORAL
Qty: 0 | Refills: 0 | Status: DISCONTINUED | OUTPATIENT
Start: 2018-03-24 | End: 2018-03-27

## 2018-03-24 RX ORDER — ASPIRIN/CALCIUM CARB/MAGNESIUM 324 MG
81 TABLET ORAL DAILY
Qty: 0 | Refills: 0 | Status: DISCONTINUED | OUTPATIENT
Start: 2018-03-24 | End: 2018-03-28

## 2018-03-24 RX ORDER — HYDROXYZINE HCL 10 MG
25 TABLET ORAL THREE TIMES A DAY
Qty: 0 | Refills: 0 | Status: DISCONTINUED | OUTPATIENT
Start: 2018-03-24 | End: 2018-03-24

## 2018-03-24 RX ORDER — HYDROCORTISONE 1 %
1 OINTMENT (GRAM) TOPICAL
Qty: 0 | Refills: 0 | Status: DISCONTINUED | OUTPATIENT
Start: 2018-03-24 | End: 2018-03-28

## 2018-03-24 RX ORDER — DOCUSATE SODIUM 100 MG
100 CAPSULE ORAL DAILY
Qty: 0 | Refills: 0 | Status: DISCONTINUED | OUTPATIENT
Start: 2018-03-24 | End: 2018-03-28

## 2018-03-24 RX ADMIN — PANTOPRAZOLE SODIUM 40 MILLIGRAM(S): 20 TABLET, DELAYED RELEASE ORAL at 06:21

## 2018-03-24 RX ADMIN — Medication 1 APPLICATION(S): at 17:38

## 2018-03-24 RX ADMIN — Medication 40 MILLIGRAM(S): at 06:20

## 2018-03-24 RX ADMIN — Medication 1 MILLIGRAM(S): at 03:43

## 2018-03-24 RX ADMIN — Medication 100 MILLIGRAM(S): at 12:24

## 2018-03-24 RX ADMIN — SODIUM CHLORIDE 80 MILLILITER(S): 9 INJECTION INTRAMUSCULAR; INTRAVENOUS; SUBCUTANEOUS at 05:31

## 2018-03-24 RX ADMIN — Medication 500 MILLIGRAM(S): at 06:20

## 2018-03-24 RX ADMIN — Medication 25 MILLIGRAM(S): at 06:20

## 2018-03-24 RX ADMIN — Medication 25 MILLIGRAM(S): at 22:31

## 2018-03-24 RX ADMIN — SODIUM CHLORIDE 80 MILLILITER(S): 9 INJECTION INTRAMUSCULAR; INTRAVENOUS; SUBCUTANEOUS at 12:24

## 2018-03-24 RX ADMIN — Medication 1 MILLIGRAM(S): at 12:24

## 2018-03-24 RX ADMIN — CHOLESTYRAMINE 4 GRAM(S): 4 POWDER, FOR SUSPENSION ORAL at 17:39

## 2018-03-24 RX ADMIN — HEPARIN SODIUM 5000 UNIT(S): 5000 INJECTION INTRAVENOUS; SUBCUTANEOUS at 07:24

## 2018-03-24 RX ADMIN — INSULIN GLARGINE 9 UNIT(S): 100 INJECTION, SOLUTION SUBCUTANEOUS at 10:02

## 2018-03-24 RX ADMIN — Medication 25 MILLIGRAM(S): at 13:06

## 2018-03-24 RX ADMIN — Medication 81 MILLIGRAM(S): at 13:05

## 2018-03-24 RX ADMIN — Medication 1 TABLET(S): at 12:24

## 2018-03-24 RX ADMIN — Medication 1 MILLIGRAM(S): at 22:31

## 2018-03-24 RX ADMIN — SPIRONOLACTONE 50 MILLIGRAM(S): 25 TABLET, FILM COATED ORAL at 06:20

## 2018-03-24 NOTE — H&P ADULT - NSHPLABSRESULTS_GEN_ALL_CORE
9.8    20.72 )-----------( 154      ( 23 Mar 2018 18:32 )             30.5   03-23    133<L>  |  97<L>  |  34<H>  ----------------------------<  163<H>  4.9   |  20  |  0.8    Ca    8.7      23 Mar 2018 18:32 9.8    20.72 )-----------( 154      ( 23 Mar 2018 18:32 )             30.5     03-23    133<L>  |  97<L>  |  34<H>  ----------------------------<  163<H>  4.9   |  20  |  0.8    Ca    8.7      23 Mar 2018 18:32 ICU Vital Signs Last 24 Hrs  T(C): 35.9 (24 Mar 2018 00:16), Max: 36.7 (23 Mar 2018 17:30)  T(F): 96.6 (24 Mar 2018 00:16), Max: 98 (23 Mar 2018 17:30)  HR: 95 (24 Mar 2018 00:16) (76 - 95)  BP: 119/81 (24 Mar 2018 00:16) (112/66 - 119/81)  BP(mean): --  ABP: --  ABP(mean): --  RR: 20 (24 Mar 2018 00:16) (18 - 20)  SpO2: 100% (24 Mar 2018 00:16) (98% - 100%)                        9.8    20.72 )-----------( 154      ( 23 Mar 2018 18:32 )             30.5        03-23    133<L>  |  97<L>  |  34<H>  ----------------------------<  163<H>  4.9   |  20  |  0.8    Ca    8.7      23 Mar 2018 18:32

## 2018-03-24 NOTE — PROGRESS NOTE ADULT - SUBJECTIVE AND OBJECTIVE BOX
GENERAL SURGERY PROGRESS NOTE    Patient: TOYA SERNA , 52y ([birth year])Female   MRN: [id number]  Location: [place] [address] A  Visit: 03-23-18 Inpatient  Date: 03-24-18 @ 05:12    Hospital Day #: 1    Procedure/Dx/Injuries:  LEAKING SKIN MACERATION, S/P BEDSIDE STICHES X 2    Events of past 24 hours: no acute events o/n    PAST MEDICAL & SURGICAL HISTORY:  Psoriasis  HTN (hypertension)  CAD (coronary artery disease)  Diverticulitis  HTN (hypertension)  DM (diabetes mellitus)  Liver cirrhosis  Stented coronary artery  S/P CABG (coronary artery bypass graft)  CLL (chronic lymphocytic leukemia)  Hernia  S/P cystoscopy  H/O colostomy  S/P CABG (coronary artery bypass graft)  History of colon resection    Vitals: T(F): 96.6 (03-24-18 @ 00:16), Max: 98 (03-23-18 @ 17:30)  HR: 95 (03-24-18 @ 00:16)  BP: 119/81 (03-24-18 @ 00:16)  RR: 20 (03-24-18 @ 00:16)  SpO2: 100% (03-24-18 @ 00:16)    Pain (0-10):            Pain Control Adequate: [] YES [] N    Diet, DASH/TLC:   Sodium & Cholesterol Restricted  Consistent Carbohydrate No Snacks    Fluids: sodium chloride 0.9%.: Solution, 1000 milliLiter(s) infuse at 80 mL/Hr  Provider's Contact #: [phone]  I & O's:    PHYSICAL EXAM  Gen: a&ox3  Lungs: clear b/l  Abd: soft, NT, ND, 1 cm noninfected abdominal wound with 2 stiches, small asitic fluid oozing through suture sited, 2 stiches applied with duoderm dressing    MEDICATIONS  (STANDING):  aspirin  chewable 81 milliGRAM(s) Oral daily  ciprofloxacin     Tablet 500 milliGRAM(s) Oral every 12 hours  docusate sodium 100 milliGRAM(s) Oral daily  folic acid 1 milliGRAM(s) Oral daily  furosemide    Tablet 40 milliGRAM(s) Oral daily  heparin  Injectable 5000 Unit(s) SubCutaneous every 8 hours  hydrOXYzine  Oral Tab/Cap - Peds 25 milliGRAM(s) Oral three times a day  LORazepam     Tablet 1 milliGRAM(s) Oral daily  multivitamin 1 Tablet(s) Oral daily  pantoprazole    Tablet 40 milliGRAM(s) Oral before breakfast  sodium chloride 0.9%. 1000 milliLiter(s) (80 mL/Hr) IV Continuous <Continuous>  spironolactone 50 milliGRAM(s) Oral daily    MEDICATIONS  (PRN):    DVT PROPHYLAXIS: [] YES [] NO   GI PROPHYLAXIS: [] YES [] NO   ANTICOAGULATION: [] YES [] NO   ANTIBIOTICS: [] YES [] NO ciprofloxacin     Tablet 500 milliGRAM(s)    LAB/STUDIES:  CAPILLARY BLOOD GLUCOSE  246 (24 Mar 2018 00:16)               9.8    20.72 )-----------( 154      ( 23 Mar 2018 18:32 )             30.5     03-23    133<L>  |  97<L>  |  34<H>  ----------------------------<  163<H>  4.9   |  20  |  0.8    Ca    8.7      23 Mar 2018 18:32                    IMAGING:

## 2018-03-24 NOTE — PROGRESS NOTE ADULT - SUBJECTIVE AND OBJECTIVE BOX
SUBJECTIVE:    Patient is a 52y old Female who presents with a chief complaint of Leakage of ascitic fluid from abdominal wound (24 Mar 2018 02:07)    Currently admitted to medicine with the primary diagnosis of Hernia     Today is hospital day 1d. This morning she is resting comfortably in bed and reports no new issues or overnight events.     PAST MEDICAL & SURGICAL HISTORY  Psoriasis  HTN (hypertension)  CAD (coronary artery disease)  Diverticulitis  HTN (hypertension)  DM (diabetes mellitus)  Liver cirrhosis  Stented coronary artery  S/P CABG (coronary artery bypass graft)  CLL (chronic lymphocytic leukemia)  Hernia  S/P cystoscopy  H/O colostomy  S/P CABG (coronary artery bypass graft)  History of colon resection    SOCIAL HISTORY:  Negative for smoking/alcohol/drug use.     ALLERGIES:  No Known Allergies    MEDICATIONS:  STANDING MEDICATIONS  aspirin  chewable 81 milliGRAM(s) Oral daily  ciprofloxacin     Tablet 500 milliGRAM(s) Oral every 12 hours  docusate sodium 100 milliGRAM(s) Oral daily  folic acid 1 milliGRAM(s) Oral daily  furosemide    Tablet 40 milliGRAM(s) Oral daily  heparin  Injectable 5000 Unit(s) SubCutaneous every 8 hours  hydrOXYzine  Oral Tab/Cap - Peds 25 milliGRAM(s) Oral three times a day  insulin glargine Injectable (LANTUS) 9 Unit(s) SubCutaneous every morning  LORazepam     Tablet 1 milliGRAM(s) Oral daily  multivitamin 1 Tablet(s) Oral daily  pantoprazole    Tablet 40 milliGRAM(s) Oral before breakfast  sodium chloride 0.9%. 1000 milliLiter(s) IV Continuous <Continuous>  spironolactone 50 milliGRAM(s) Oral daily    PRN MEDICATIONS    VITALS:   T(F): 97  HR: 101  BP: 101/59  RR: 17  SpO2: 98%    LABS:                        9.8    20.72 )-----------( 154      ( 23 Mar 2018 18:32 )             30.5     03-23    133<L>  |  97<L>  |  34<H>  ----------------------------<  163<H>  4.9   |  20  |  0.8    Ca    8.7      23 Mar 2018 18:32                    RADIOLOGY:    PHYSICAL EXAM:  GEN: No acute distress  LUNGS: Clear to auscultation bilaterally   HEART: S1/S2 present. RRR.   ABD: Soft, non-tender, non-distended. Bowel sounds present, abdominal bandage noted  EXT: NC/NC/NE/2+PP/SANCHEZ  NEURO: AAOX3 SUBJECTIVE:    Patient is a 52y old Female who presents with a chief complaint of Leakage of ascitic fluid from abdominal wound (24 Mar 2018 02:07)  Currently admitted to medicine with the primary diagnosis of Hernia  Today is hospital day 1d. This morning she is resting comfortably in bed and reports no new issues or overnight events.     PAST MEDICAL & SURGICAL HISTORY  Psoriasis  HTN (hypertension)  CAD (coronary artery disease)  Diverticulitis  HTN (hypertension)  DM (diabetes mellitus)  Liver cirrhosis  Stented coronary artery  S/P CABG (coronary artery bypass graft)  CLL (chronic lymphocytic leukemia)  Hernia  S/P cystoscopy  H/O colostomy  S/P CABG (coronary artery bypass graft)  History of colon resection    SOCIAL HISTORY:  Negative for smoking/alcohol/drug use.     ALLERGIES:  No Known Allergies    MEDICATIONS:  STANDING MEDICATIONS  aspirin  chewable 81 milliGRAM(s) Oral daily  ciprofloxacin     Tablet 500 milliGRAM(s) Oral every 12 hours  docusate sodium 100 milliGRAM(s) Oral daily  folic acid 1 milliGRAM(s) Oral daily  furosemide    Tablet 40 milliGRAM(s) Oral daily  heparin  Injectable 5000 Unit(s) SubCutaneous every 8 hours  hydrOXYzine  Oral Tab/Cap - Peds 25 milliGRAM(s) Oral three times a day  insulin glargine Injectable (LANTUS) 9 Unit(s) SubCutaneous every morning  LORazepam     Tablet 1 milliGRAM(s) Oral daily  multivitamin 1 Tablet(s) Oral daily  pantoprazole    Tablet 40 milliGRAM(s) Oral before breakfast  sodium chloride 0.9%. 1000 milliLiter(s) IV Continuous <Continuous>  spironolactone 50 milliGRAM(s) Oral daily    PRN MEDICATIONS    VITALS:   T(F): 97  HR: 101  BP: 101/59  RR: 17  SpO2: 98%    LABS:                        9.8    20.72 )-----------( 154      ( 23 Mar 2018 18:32 )             30.5     03-23    133<L>  |  97<L>  |  34<H>  ----------------------------<  163<H>  4.9   |  20  |  0.8    Ca    8.7      23 Mar 2018 18:32      RADIOLOGY:  US (03/13): Trace ascites is noted.     PHYSICAL EXAM:  GEN: No acute distress  LUNGS: Clear to auscultation bilaterally   HEART: S1/S2 present. RRR.   ABD: Soft, non-tender, non-distended. Bowel sounds present, abdominal bandage noted  EXT: NC/NC/NE/2+PP/SANCHEZ  NEURO: AAOX3 SUBJECTIVE:    Patient is a 52y old Female who presents with a chief complaint of Leakage of ascitic fluid from abdominal wound (24 Mar 2018 02:07)  Currently admitted to medicine with the primary diagnosis of Hernia  Today is hospital day 1d. This morning she is resting comfortably in bed and reports no new issues or overnight events.     PAST MEDICAL & SURGICAL HISTORY  Psoriasis  HTN (hypertension)  CAD (coronary artery disease)  Diverticulitis  HTN (hypertension)  DM (diabetes mellitus)  Liver cirrhosis  Stented coronary artery  S/P CABG (coronary artery bypass graft)  CLL (chronic lymphocytic leukemia)  Hernia  S/P cystoscopy  H/O colostomy  S/P CABG (coronary artery bypass graft)  History of colon resection    SOCIAL HISTORY:  Negative for smoking/alcohol/drug use.     ALLERGIES:  No Known Allergies    MEDICATIONS:  STANDING MEDICATIONS  aspirin  chewable 81 milliGRAM(s) Oral daily  ciprofloxacin     Tablet 500 milliGRAM(s) Oral every 12 hours  docusate sodium 100 milliGRAM(s) Oral daily  folic acid 1 milliGRAM(s) Oral daily  furosemide    Tablet 40 milliGRAM(s) Oral daily  heparin  Injectable 5000 Unit(s) SubCutaneous every 8 hours  hydrOXYzine  Oral Tab/Cap - Peds 25 milliGRAM(s) Oral three times a day  insulin glargine Injectable (LANTUS) 9 Unit(s) SubCutaneous every morning  LORazepam     Tablet 1 milliGRAM(s) Oral daily  multivitamin 1 Tablet(s) Oral daily  pantoprazole    Tablet 40 milliGRAM(s) Oral before breakfast  sodium chloride 0.9%. 1000 milliLiter(s) IV Continuous <Continuous>  spironolactone 50 milliGRAM(s) Oral daily    PRN MEDICATIONS    VITALS:   T(F): 97  HR: 101  BP: 101/59  RR: 17  SpO2: 98%    LABS:                        9.8    20.72 )-----------( 154      ( 23 Mar 2018 18:32 )             30.5     03-23    133<L>  |  97<L>  |  34<H>  ----------------------------<  163<H>  4.9   |  20  |  0.8    Ca    8.7      23 Mar 2018 18:32      RADIOLOGY:  US (03/13): Trace ascites is noted.     PHYSICAL EXAM:  GEN: No acute distress  LUNGS: Clear to auscultation bilaterally   HEART: S1/S2 present. RRR.   ABD: Soft, non-tender, large ventral hernia. Bowel sounds present, abdominal bandage noted  EXT: NC/NC/NE/2+PP/SANCHEZ  NEURO: AAOX3

## 2018-03-24 NOTE — H&P ADULT - ASSESSMENT
#) end stage liver disease/ liver cirrhosis/ ascitis/ leukocytosis/ Hyperbilirubinemia  - c/w hospice care  - surgery cs- s/p suturing of abdominal wall dehiscense  -Follow up with gen surgery    #) cad/ liver cirrhosis/ htn/ dm  - c/w current meds    #) dvt ppx  - Hep Sc    #) dispo  - home hospice #)liver cirrhosis/ ascitis  -c/w aldactone, furosemide, ciprofloxacin  -c/w hospice care  -s/p suturing of abdominal wall dehiscense by surgery  -Follow up with gen surgery    #)Leukocytosis sec to CLL  No fever, no need for infection work up.      #)end stage liver disease    #) cad/ htn/ dm  - c/w aspirin  -monitor Blood pressure  -monitor FS keep <180 resume insulin if >180    #) dvt ppx  - Hep Sc    #) dispo  - home hospice #)liver cirrhosis/ ascitis  -c/w aldactone, furosemide, ciprofloxacin  -c/w hospice care  -s/p suturing of abdominal wall dehiscense by surgery  -Follow up with gen surgery    #)Leukocytosis sec to CLL  No fever, no need for infection work up.    #)end stage liver disease    #) cad/ htn/ dm  - c/w aspirin  -monitor Blood pressure  -monitor FS keep <180 resume insulin if >180    #)Chronic hyponatremia sec to ESRD  Monitor BMP and electrolytes    #)Anxiety  Ativan 1mg oral daily    #)Diet  Carb consistent, low sodium    #)Activity  Ambulate with assistance    #) dvt ppx/GI PPX  - Hep Sc, Protonix    #) dispo  - home hospice #)liver cirrhosis/ ascitis  -c/w aldactone, furosemide, ciprofloxacin  -c/w hospice care  -s/p suturing of abdominal wall dehiscense by surgery  -Follow up with gen surgery    #)Leukocytosis sec to CLL  No fever, no need for infection work up.    #)end stage renal disease  not on dialysis    #) cad/ htn/ dm  - c/w aspirin  -monitor Blood pressure  -monitor FS keep <180 resume insulin if >180    #)Chronic hyponatremia sec to ESRD  Monitor BMP and electrolytes    #)Anxiety  Ativan 1mg oral daily    #)Diet  Carb consistent, low sodium    #)Activity  Ambulate with assistance    #) dvt ppx/GI PPX  - Hep Sc, Protonix    #) dispo  - home hospice    #)Code status:  only DNR, NO DNI #)liver cirrhosis/ ascitis  -c/w aldactone, furosemide, ciprofloxacin  -c/w hospice care  -s/p suturing of abdominal wall dehiscense by surgery  -Follow up with gen surgery    #)Leukocytosis sec to CLL  No fever, no need for infection work up.    #)end stage renal disease  not on dialysis    #) cad/ htn/ dm  - c/w aspirin  -monitor Blood pressure  -monitor FS keep <180 resume insulin if >180    #)Chronic hyponatremia sec to ESRD  Monitor BMP and electrolytes    #)Anxiety  Ativan 1mg oral daily    #)Diet  Carb consistent, low sodium    #)Activity  Ambulate with assistance    #) dvt ppx/GI PPX  - Hep Sc, Protonix    #) dispo  - home hospice, follow up hospice consult.    #)Code status:  only DNR, NO DNI #)End Stage Liver Disease/liver cirrhosis/ ascitis  -c/w aldactone, furosemide, ciprofloxacin  -c/w hospice care  -s/p suturing of abdominal wall dehiscense by surgery  -Follow up with gen surgery    #)Leukocytosis sec to CLL  No fever, no need for infection work up.        #) cad/ htn/ dm  - c/w aspirin  -monitor Blood pressure  -monitor FS keep <180 resume insulin if >180    #)Chronic hyponatremia sec to liver disease  Monitor BMP and electrolytes    #)Anxiety  Ativan 1mg oral daily    #)Diet  Carb consistent, low sodium    #)Activity  Ambulate with assistance    #) dvt ppx/GI PPX  - Hep Sc, Protonix    #) dispo  - home hospice, follow up hospice consult.    #)Code status:  only DNR, NO DNI #)End Stage Liver Disease/liver cirrhosis/ ascitis  -c/w aldactone, furosemide.  -Stopped cipro she was taking it for SBP prophylaxis.  -c/w hospice care  -s/p suturing of abdominal wall dehiscense by surgery  -Follow up with gen surgery.    #)Leukocytosis sec to CLL  No fever, no need for infection work up.  Follow up AM labs.    #) cad/ htn/ dm  - c/w aspirin  -monitor Blood pressure  -monitor FS keep <180 resume insulin if >180    #)Chronic hyponatremia sec to liver disease  Monitor BMP and electrolytes    #)Anxiety  Ativan 1mg oral daily    #)Diet  Carb consistent, low sodium    #)Activity  Ambulate with assistance    #) dvt ppx/GI PPX  - Hep Sc, Protonix    #) dispo  - home hospice, follow up hospice consult.    #)Code status:  only DNR, NO DNI #)End Stage Liver Disease/liver cirrhosis/ ascitis  -c/w aldactone, furosemide.  -Stopped cipro she was taking it for SBP prophylaxis(She was told to stop after 1 week in last admission)  -c/w hospice care  -s/p suturing of abdominal wall dehiscense by surgery  -Follow up with gen surgery.    #)Leukocytosis sec to CLL  No fever, no need for infection work up.  Follow up AM labs.    #) cad/ htn/ dm  - c/w aspirin  -monitor Blood pressure  -monitor FS keep <180 resume insulin if >180    #)Chronic hyponatremia sec to liver disease  Monitor BMP and electrolytes    #)Anxiety  Ativan 1mg oral daily    #)Diet  Carb consistent, low sodium    #)Activity  Ambulate with assistance    #) dvt ppx/GI PPX  - Hep Sc, Protonix    #) Dispo:  - Home hospice, follow up hospice consult.    #)Code status:  only DNR, NO DNI #)End Stage Liver Disease/liver cirrhosis/ ascitis  -c/w aldactone, furosemide.  -Stopped cipro she was taking it for SBP prophylaxis (She was told to stop after 1 week in last admission)  -c/w hospice care  -s/p suturing of abdominal wall dehiscense by surgery  -Follow up with gen surgery.  can use midodrine if hypoTN develops - avoid IVFs and NO IV PRESSOR AGENTS    # Pruritis  Face - hydrocort 2% cr q12 top to face for scaling seborrhea  use moisturizer to body q12  can use topical lidocaine to any painful areas on skin  add cholestyramine 4gm q12 and increase as amanda  can cont hydroxyzine 25mg po q6h prn itch - though antihistamine not too likely to help    #)Leukocytosis sec to CLL  No fever, no need for infection work up.  see no reason to do labs as pt is on hospice and this cannot be treated    #) cad/ htn/   - c/w aspirin  -monitor Blood pressure    # dm type 2  -monitor FS keep <180   - diabetic diet    #)Chronic hyponatremia sec to liver disease  see no reason to do labs, do not restrict salt in diet  hypoNa is poor prog sign    #)Anxiety  Ativan 1mg oral daily or more as needed    #)Activity  Ambulate with assistance    #) dvt ppx/GI PPX  - Hep Sc, Protonix    #) Dispo:  - Home hospice, follow up hospice consult.  - should limit labs    #)Code status:  only DNR, NO DNI #)End Stage Liver Disease/liver cirrhosis/ ascitis  -c/w aldactone, furosemide.  -was taking cipro for SBP prophylaxis (She was told to stop after 1 week in last admission) - can resume at 500mg 3x/wk  -c/w hospice care  -s/p suturing of abdominal wall dehiscense by surgery  -Follow up with gen surgery.  can use midodrine if hypoTN develops - avoid IVFs and NO IV PRESSOR AGENTS    # Pruritis  Face - hydrocort 2% cr q12 top to face for scaling seborrhea  use moisturizer to body q12  can use topical lidocaine to any painful areas on skin  add cholestyramine 4gm q12 and increase as amanda  can cont hydroxyzine 25mg po q6h prn itch - though antihistamine not too likely to help    #)Leukocytosis sec to CLL  No fever, no need for infection work up.  see no reason to do labs as pt is on hospice and this cannot be treated    #) cad/ htn/   - c/w aspirin  -monitor Blood pressure    # dm type 2  -monitor FS keep <180   - diabetic diet    #)Chronic hyponatremia sec to liver disease  see no reason to do labs, do not restrict salt in diet  hypoNa is poor prog sign    #)Anxiety  Ativan 1mg oral daily or more as needed    #)Activity  Ambulate with assistance    #) dvt ppx/GI PPX  - Hep Sc, Protonix    #) Dispo:  - Home hospice, follow up hospice consult.  - should limit labs    #)Code status:  only DNR, NO DNI #)End Stage Liver Disease/liver cirrhosis/ ascitis  -c/w aldactone, furosemide.  -was taking cipro for SBP prophylaxis (She was told to stop after 1 week in last admission) - can resume at 500mg 3x/wk  -c/w hospice care  -s/p suturing of abdominal wall dehiscense by surgery  -Follow up with gen surgery.  can use midodrine if hypoTN develops - avoid IVFs and NO IV PRESSOR AGENTS  stop lasix, cont aldactone - might increase dose    # Pruritis  Face - hydrocort 2% cr q12 top to face for scaling seborrhea  use moisturizer to body q12  can use topical lidocaine to any painful areas on skin  add cholestyramine 4gm q12 and increase as amanda  can cont hydroxyzine 25mg po q6h prn itch - though antihistamine not too likely to help    #)Leukocytosis sec to CLL  No fever, no need for infection work up.  see no reason to do labs as pt is on hospice and this cannot be treated    #) cad/ htn/   - c/w aspirin  -monitor Blood pressure    # dm type 2  -monitor FS keep <180   - diabetic diet    #)Chronic hyponatremia sec to liver disease  see no reason to do labs, do not restrict salt in diet  hypoNa is poor prog sign    #)Anxiety  Ativan 1mg oral daily or more as needed    #)Activity  Ambulate with assistance    #) dvt ppx/GI PPX  - Hep Sc, Protonix    #) Dispo:  - Home hospice, follow up hospice consult.  - should limit labs    #)Code status:  only DNR, NO DNI

## 2018-03-24 NOTE — H&P ADULT - HISTORY OF PRESENT ILLNESS
51 yo F with PMH/PSH CAD, liver cirrhosis (idiopathic ?), CLL, ascites, s/p Hartmans and reversal for complicated diverticulitis and colovesicular fistula 3-4 years ago, has incisional hernia associated with previous parastomal site, large, nonobstructive, not surgical candidate, currently in hospice. Had recent hepato-renal s-me and abdominal skin maceration with 1 cm wound leaking, bedside closure with 2 stiches. Came to office of Dr. Bergman with same problem and  was sent to ED by Dr. Bergman applied 2 stiches to the wound and duoderm dressing. Patient is admitted to inpatient hospice. 53 yo F with PMH of CAD, liver cirrhosis (idiopathic ?), CLL, ascites, s/p Hartmans and reversal for complicated diverticulitis and colovesicular fistula 3-4 years ago, has incisional hernia associated with previous parastomal site, large, nonobstructive, not surgical candidate, currently in hospice presented to the hospital for small leaking wound over incisional hernia site. Pt seen by Dr. Bergman in his office and sent to the ED. In the ED suturing was done by surgery.  pt currenty being admitted for observation.  She had multiple hospital visits for similar complains.pt form home hospice; sister takes care of her 51 yo F with PMH of CAD, liver cirrhosis (idiopathic ?), CLL, ascites, s/p Hartmans and reversal for complicated diverticulitis and colovesicular fistula 3-4 years ago, has incisional hernia associated with previous parastomal site, large, nonobstructive, not surgical candidate, currently in hospice presented to the hospital for small leaking wound over incisional hernia site. Pt seen by Dr. Bergman in his office and sent to the ED. In the ED suturing was done by surgery.  pt currenty being admitted for observation.  She had multiple hospital visits for similar complains last time she was admitted in the hospital for paracentesis 2 weeks ago discharged to home hospice. sister takes care of her. 51 yo F with PMH of CAD, liver cirrhosis (idiopathic ?), CLL, ascites, s/p Hartmans and reversal for complicated diverticulitis and colovesicular fistula 3-4 years ago, has incisional hernia associated with previous parastomal site, large, nonobstructive, not surgical candidate, currently in hospice presented to the hospital for small leaking wound over incisional hernia site. Pt seen by Dr. Bergman in his office where he repaired the wound and did stiching and sent to the ED. She kept on leaking again surgery team did another 2 stiches in the ED. Spoke with sister at bedside she said her sister had multiple hospital visits for similar complains last time she was admitted in the hospital2 weeks ago discharged to home hospice. Sister id the health care proxy she takes care of her. Denies cough, fever, chills, n/v/abd pain, SOB, Leg swelling. Patient is currently admitted to medical for observation.

## 2018-03-24 NOTE — PROGRESS NOTE ADULT - ASSESSMENT
ASSESSMENT:  51 yo F with ascites, leaking of 1 cm abdominal wound s/p bedside stiches x 2    PLAN  Hospice admit  No dressing change until falls off

## 2018-03-24 NOTE — H&P ADULT - NSHPOUTPATIENTPROVIDERS_GEN_ALL_CORE
onco- dr andersen; gi - sharifa, surgery- jim; pcp- katy; IR at [facility]; hepatology- back at [facility] for now

## 2018-03-24 NOTE — PROGRESS NOTE ADULT - SUBJECTIVE AND OBJECTIVE BOX
S  Patient was seen and examined at bedside, received a call from her nurse due to excessive leakage from the anterior abdomen that required reinforcement. Patient abdomen was exposed and prepped with betadine, two small areas (1cm) of skin breakdown were noted, no zohra oozing of fluid was noted. Prior nylon sutures were in place.   The two skin openings were closed with 2-0 nylon and covered with xeroform and abdominal pads and secured tightly with an abdominal binder. Patient tolerated procedure well but was visibly upset due to persistent leakage and overall condition.   O:  Gen Nad, aaox3, cachectic, emaciated   Pulm ctabl  Abd: soft, nt, nf, large ventral hernia, bowel is seen through the skin  Extremities: no edema, erythema, or tenderness    A/P    - oob--> chair, ambulate  - PT/rehab,   - dispo planning

## 2018-03-24 NOTE — H&P ADULT - NSHPPHYSICALEXAM_GEN_ALL_CORE
Gen:  CVS:  RS:  GI:  Extre:  Neuro: GENERAL: icterus  HEAD:  Atraumatic, Normocephalic  EYES: EOMI, PERRLA, conjunctiva and sclera clear  NECK: Supple, No JVD  CHEST/LUNG: Clear to auscultation bilaterally;   HEART: Regular rate and rhythm; No murmurs;   ABDOMEN: in abdominal bandage;   PSYCH: AAOx3  NEUROLOGY: non-focal: GENERAL: NAD  HEAD:  Atraumatic, Normocephalic  EYES: EOMI, PERRLA, conjunctiva and sclera clear  NECK: Supple, No JVD  CHEST/LUNG: Clear to auscultation bilaterally;   HEART: Regular rate and rhythm; No murmurs;   ABDOMEN: in abdominal bandage;   PSYCH: AAOx3  NEUROLOGY: non-focal:

## 2018-03-24 NOTE — PROVIDER CONTACT NOTE (OTHER) - SITUATION
MD at bedside admitting pt. MD made aware of pt's DNR only status. pending orders. sister at bedside

## 2018-03-24 NOTE — PROGRESS NOTE ADULT - ASSESSMENT
#)End Stage Liver Disease/liver cirrhosis/ ascitis  - c/w aldactone, furosemide.  - cipro discontinued for SBP prophylaxis (abx course completed)  - c/w hospice care  - s/p suturing of abdominal wall dehiscence by surgery  - Surgery consult pending     #)Leukocytosis sec to CLL  - No fever, no need for infection work up.  - Follow up AM labs.    #) CAD / HTN / DM  - c/w aspirin  - monitor Blood pressure  - monitor FS keep <180 resume insulin if >180    #)Chronic hyponatremia sec to liver disease  - Monitor BMP and electrolytes    #)Anxiety  - Ativan 1mg oral daily    #)Diet  - Carb consistent, low sodium    #)Activity  - Ambulate with assistance    #) dvt ppx/GI PPX  - Hep Sc, Protonix    #) Dispo:  - Home hospice  - Follow up hospice consult.    #)Code status:  - only DNR, NO DNI #)End Stage Liver Disease/liver cirrhosis/ ascitis  - c/w aldactone, furosemide.  - Blood culture: negative (final)  - cipro discontinued for SBP prophylaxis (abx course completed)  - c/w hospice care  - s/p suturing of abdominal wall dehiscence by surgery  - Surgery consult pending     #)Leukocytosis sec to CLL  - No fever, no need for infection work up.  - Follow up AM labs.    #) CAD / HTN / DM  - c/w aspirin  - monitor Blood pressure  - monitor FS keep <180 resume insulin if >180    #)Chronic hyponatremia sec to liver disease  - Monitor BMP and electrolytes    #)Anxiety  - Ativan 1mg oral daily    #)Diet  - Carb consistent, low sodium    #)Activity  - Ambulate with assistance    #) dvt ppx/GI PPX  - Hep Sc, Protonix    #) Dispo:  - Home hospice  - Follow up hospice consult.    #)Code status:  - only DNR, NO DNI 53 yo F with PMH of CAD, liver cirrhosis (idiopathic ?), CLL, ascites, s/p Hartmans and reversal for complicated diverticulitis and colovesicular fistula 3-4 years ago, has incisional hernia associated with previous parastomal site, large, nonobstructive, not surgical candidate, currently in hospice presented to the hospital for small leaking wound over incisional hernia site.    #)End Stage Liver Disease/liver cirrhosis/ ascitis  - c/w aldactone, furosemide.  - Blood culture: negative (final)  - cipro discontinued for SBP prophylaxis (abx course completed)  - c/w hospice care  - s/p suturing of abdominal wall dehiscence by surgery  - Surgery consult pending     #)Leukocytosis sec to CLL  - No fever, no need for infection work up.  - Follow up AM labs.    #) CAD / HTN / DM  - c/w aspirin  - monitor Blood pressure  - monitor FS keep <180 resume insulin if >180    #)Chronic hyponatremia sec to liver disease  - Monitor BMP and electrolytes    #)Anxiety  - Ativan 1mg oral daily    #)Diet  - Carb consistent, low sodium    #)Activity  - Ambulate with assistance    #) dvt ppx/GI PPX  - Hep Sc, Protonix    #) Dispo:  - Home hospice  - Follow up hospice consult.    #)Code status:  - only DNR, NO DNI

## 2018-03-25 LAB
ANION GAP SERPL CALC-SCNC: 14 MMOL/L — SIGNIFICANT CHANGE UP (ref 7–14)
BASOPHILS # BLD AUTO: 0.07 K/UL — SIGNIFICANT CHANGE UP (ref 0–0.2)
BASOPHILS NFR BLD AUTO: 0.4 % — SIGNIFICANT CHANGE UP (ref 0–1)
BUN SERPL-MCNC: 18 MG/DL — SIGNIFICANT CHANGE UP (ref 10–20)
CALCIUM SERPL-MCNC: 8 MG/DL — LOW (ref 8.5–10.1)
CHLORIDE SERPL-SCNC: 99 MMOL/L — SIGNIFICANT CHANGE UP (ref 98–110)
CO2 SERPL-SCNC: 20 MMOL/L — SIGNIFICANT CHANGE UP (ref 17–32)
CREAT SERPL-MCNC: 0.9 MG/DL — SIGNIFICANT CHANGE UP (ref 0.7–1.5)
EOSINOPHIL # BLD AUTO: 0.3 K/UL — SIGNIFICANT CHANGE UP (ref 0–0.7)
EOSINOPHIL NFR BLD AUTO: 1.8 % — SIGNIFICANT CHANGE UP (ref 0–8)
GLUCOSE SERPL-MCNC: 156 MG/DL — HIGH (ref 70–99)
HCT VFR BLD CALC: 29.5 % — LOW (ref 37–47)
HGB BLD-MCNC: 9.2 G/DL — LOW (ref 12–16)
IMM GRANULOCYTES NFR BLD AUTO: 0.4 % — HIGH (ref 0.1–0.3)
LYMPHOCYTES # BLD AUTO: 50.1 % — SIGNIFICANT CHANGE UP (ref 20.5–51.1)
LYMPHOCYTES # BLD AUTO: 8.58 K/UL — HIGH (ref 1.2–3.4)
MCHC RBC-ENTMCNC: 28.5 PG — SIGNIFICANT CHANGE UP (ref 27–31)
MCHC RBC-ENTMCNC: 31.2 G/DL — LOW (ref 32–37)
MCV RBC AUTO: 91.3 FL — SIGNIFICANT CHANGE UP (ref 81–99)
MONOCYTES # BLD AUTO: 0.67 K/UL — HIGH (ref 0.1–0.6)
MONOCYTES NFR BLD AUTO: 3.9 % — SIGNIFICANT CHANGE UP (ref 1.7–9.3)
NEUTROPHILS # BLD AUTO: 7.44 K/UL — HIGH (ref 1.4–6.5)
NEUTROPHILS NFR BLD AUTO: 43.4 % — SIGNIFICANT CHANGE UP (ref 42.2–75.2)
NRBC # BLD: 0 /100 WBCS — SIGNIFICANT CHANGE UP (ref 0–0)
PLATELET # BLD AUTO: 136 K/UL — SIGNIFICANT CHANGE UP (ref 130–400)
POTASSIUM SERPL-MCNC: 3.9 MMOL/L — SIGNIFICANT CHANGE UP (ref 3.5–5)
POTASSIUM SERPL-SCNC: 3.9 MMOL/L — SIGNIFICANT CHANGE UP (ref 3.5–5)
RBC # BLD: 3.23 M/UL — LOW (ref 4.2–5.4)
RBC # FLD: 13.8 % — SIGNIFICANT CHANGE UP (ref 11.5–14.5)
SODIUM SERPL-SCNC: 133 MMOL/L — LOW (ref 135–146)
WBC # BLD: 17.13 K/UL — HIGH (ref 4.8–10.8)
WBC # FLD AUTO: 17.13 K/UL — HIGH (ref 4.8–10.8)

## 2018-03-25 RX ADMIN — PANTOPRAZOLE SODIUM 40 MILLIGRAM(S): 20 TABLET, DELAYED RELEASE ORAL at 05:26

## 2018-03-25 RX ADMIN — HEPARIN SODIUM 5000 UNIT(S): 5000 INJECTION INTRAVENOUS; SUBCUTANEOUS at 05:24

## 2018-03-25 RX ADMIN — Medication 1 MILLIGRAM(S): at 22:24

## 2018-03-25 RX ADMIN — Medication 1 MILLIGRAM(S): at 13:48

## 2018-03-25 RX ADMIN — SPIRONOLACTONE 50 MILLIGRAM(S): 25 TABLET, FILM COATED ORAL at 05:26

## 2018-03-25 RX ADMIN — Medication 1 TABLET(S): at 13:48

## 2018-03-25 RX ADMIN — Medication 1 APPLICATION(S): at 17:03

## 2018-03-25 RX ADMIN — Medication 81 MILLIGRAM(S): at 11:10

## 2018-03-25 RX ADMIN — Medication 1 APPLICATION(S): at 05:25

## 2018-03-25 RX ADMIN — INSULIN GLARGINE 9 UNIT(S): 100 INJECTION, SOLUTION SUBCUTANEOUS at 08:33

## 2018-03-25 RX ADMIN — CHOLESTYRAMINE 4 GRAM(S): 4 POWDER, FOR SUSPENSION ORAL at 08:33

## 2018-03-25 RX ADMIN — CHOLESTYRAMINE 4 GRAM(S): 4 POWDER, FOR SUSPENSION ORAL at 22:11

## 2018-03-25 RX ADMIN — Medication 100 MILLIGRAM(S): at 13:48

## 2018-03-25 NOTE — PROGRESS NOTE ADULT - SUBJECTIVE AND OBJECTIVE BOX
RAYTOYA ARBOLEDA  52y  Female  ***My note supercedes ALL resident notes that I sign.  My corrections for their notes are in my note.***    INTERVAL EVENTS: Pt says itching is about the same.  Says abd leak is a little better.  She is relatively comfortable and had no particular needs.    T(F): 97.6 (03-25-18 @ 05:43), Max: 97.6 (03-24-18 @ 20:39)  HR: 85 (03-25-18 @ 05:43) (85 - 94)  BP: 106/52 (03-25-18 @ 05:43) (94/53 - 106/52)  RR: 20 (03-25-18 @ 05:43) (20 - 20)  SpO2: 96% (03-25-18 @ 07:48) (96% - 96%)    PHYSICAL EXAM:  GEN: No acute distress  LUNGS: Clear to auscultation bilaterally   HEART: S1/S2 present. RRR.   ABD: Soft, distended - but old finding (has hernia issue); abd binder in place;   EXT: no edema  NEURO: AAOX3    LABS:                        9.2     (    91.3   17.13 )-----------( ---------      136      ( 25 Mar 2018 06:02 )             29.5    (    13.8     136   (   99   (   164      03-24-18 @ 09:06  ----------------------               4.0   (   21   (   24                             -----                        0.8  Ca  8.2   Mg  --    P   --     LFT  4.9  (  5.6  (  172       03-24-18 @ 09:06  -------------------------  3.0  (  >1200  (  218    PT/INR - ( 24 Mar 2018 09:06 )   PT: 11.40 sec;   INR: 1.05 ratio         PTT - ( 24 Mar 2018 09:06 )  PTT:25.9 sec    RADIOLOGY & ADDITIONAL TESTS:    ciprofloxacin     Tablet 500 milliGRAM(s) Oral <User Schedule>    aspirin  chewable 81 milliGRAM(s) Oral daily  cholestyramine Powder (Sugar-Free) 4 Gram(s) Oral two times a day  docusate sodium 100 milliGRAM(s) Oral daily  folic acid 1 milliGRAM(s) Oral daily  heparin  Injectable 5000 Unit(s) SubCutaneous every 12 hours  hydrocortisone 2.5% Cream 1 Application(s) Topical two times a day  hydrOXYzine hydrochloride 25 milliGRAM(s) Oral four times a day PRN  insulin glargine Injectable (LANTUS) 9 Unit(s) SubCutaneous every morning  LORazepam     Tablet 1 milliGRAM(s) Oral at bedtime PRN  multivitamin 1 Tablet(s) Oral daily  pantoprazole    Tablet 40 milliGRAM(s) Oral before breakfast  spironolactone 50 milliGRAM(s) Oral daily

## 2018-03-25 NOTE — PROGRESS NOTE ADULT - ASSESSMENT
#)End Stage Liver Disease/liver cirrhosis/ ascitis  -c/w aldactone, furosemide.  -was taking cipro for SBP prophylaxis (She was told to stop after 1 week in last admission) - can resume at 500mg 3x/wk  -c/w hospice care  -s/p suturing of abdominal wall dehiscense by surgery  can use midodrine if hypoTN develops - avoid IVFs and NO IV PRESSOR AGENTS  stop lasix, cont aldactone - might increase dose  LFTs are very abnl and this cannot be altered    # Pruritis  Face - hydrocort 2% cr q12 top to face for scaling seborrhea  use moisturizer to body q12  can use topical lidocaine to any painful areas on skin  add cholestyramine 4gm q12 and increase as amanda  can cont hydroxyzine 25mg po q6h prn itch - though antihistamine not too likely to help    #)Leukocytosis sec to CLL  No fever, no need for infection work up.  see no reason to do labs as pt is on hospice and this cannot be treated    #) cad/ htn/   - c/w aspirin    # dm type 2  -monitor FS keep <180   - diabetic diet    #)Chronic hyponatremia sec to liver disease  Na 136 now; do not salt restrict in diet    #)Anxiety  Ativan 1mg oral daily or more as needed    #)Activity  Ambulate with assistance    #) dvt ppx/GI PPX  - Hep Sc, Protonix    #) Dispo:  - Home hospice, follow up hospice consult.  - should limit labs  - would anticipate d/c home tomorrow    #)Code status:  only DNR, NO DNI

## 2018-03-26 RX ORDER — SPIRONOLACTONE 25 MG/1
100 TABLET, FILM COATED ORAL DAILY
Qty: 0 | Refills: 0 | Status: DISCONTINUED | OUTPATIENT
Start: 2018-03-26 | End: 2018-03-28

## 2018-03-26 RX ADMIN — Medication 1 TABLET(S): at 11:49

## 2018-03-26 RX ADMIN — CHOLESTYRAMINE 4 GRAM(S): 4 POWDER, FOR SUSPENSION ORAL at 08:10

## 2018-03-26 RX ADMIN — SPIRONOLACTONE 100 MILLIGRAM(S): 25 TABLET, FILM COATED ORAL at 18:20

## 2018-03-26 RX ADMIN — Medication 500 MILLIGRAM(S): at 08:09

## 2018-03-26 RX ADMIN — PANTOPRAZOLE SODIUM 40 MILLIGRAM(S): 20 TABLET, DELAYED RELEASE ORAL at 06:30

## 2018-03-26 RX ADMIN — INSULIN GLARGINE 9 UNIT(S): 100 INJECTION, SOLUTION SUBCUTANEOUS at 08:10

## 2018-03-26 RX ADMIN — Medication 100 MILLIGRAM(S): at 11:49

## 2018-03-26 RX ADMIN — SPIRONOLACTONE 50 MILLIGRAM(S): 25 TABLET, FILM COATED ORAL at 06:47

## 2018-03-26 RX ADMIN — Medication 1 MILLIGRAM(S): at 11:49

## 2018-03-26 RX ADMIN — CHOLESTYRAMINE 4 GRAM(S): 4 POWDER, FOR SUSPENSION ORAL at 21:29

## 2018-03-26 RX ADMIN — Medication 81 MILLIGRAM(S): at 11:49

## 2018-03-26 RX ADMIN — Medication 1 APPLICATION(S): at 17:36

## 2018-03-26 RX ADMIN — Medication 1 APPLICATION(S): at 08:19

## 2018-03-26 NOTE — CONSULT NOTE ADULT - SUBJECTIVE AND OBJECTIVE BOX
53 yo F with PMH/PSH: CAD, liver cirrhosis (idiopathic ?), CLL, ascites, s/p Hartmans and reversal for complicated diverticulitis and colovesicular fistula 3-4 years ago, has incisional hernia associated with previous parastomal site, large, nonobstructive, not surgical candidate, currently in hospice. Had recent hepato-renal s-me and abdominal skin maceration with 1 cm wound leaking, bedside closure with 2 stiches. Came to office of Dr. Bergman with same problema dn was ent to ED. Dr. Bergman applied 2 stiches to the wound and duoderm dressing. Patient is admitted to inpatient hospice.    Meds: ASA, coreg, digoxin, lasix, losartan      Physical exam:  Gen: a&ox3  Lungs: clear b/l  Abd: soft, NT, ND, 1 cm noninfected abdominal wound with 2 stiches, small asitic fluid oozing through suture sited, 2 stiches applied with duoderm dressing
INTERVENTIONAL RADIOLOGY CONSULT:     Procedure Requested: peritoneal drain     HPI:  51 yo F with PMH of CAD, liver cirrhosis (idiopathic ?), CLL, ascites, s/p Hartmans and reversal for complicated diverticulitis and colovesicular fistula 3-4 years ago, has incisional hernia associated with previous parastomal site, large, nonobstructive, not surgical candidate, currently in hospice presented to the hospital for small leaking wound over incisional hernia site. Pt seen by Dr. Bergman in his office where he repaired the wound and did stiching and sent to the ED. She kept on leaking again surgery team did another 2 stiches in the ED. Spoke with sister at bedside she said her sister had multiple hospital visits for similar complains last time she was admitted in the hospital2 weeks ago discharged to home hospice. Sister id the health care proxy she takes care of her. Denies cough, fever, chills, n/v/abd pain, SOB, Leg swelling. Patient is currently admitted to medical for observation. (24 Mar 2018 02:07)      PAST MEDICAL & SURGICAL HISTORY:  Psoriasis  HTN (hypertension)  CAD (coronary artery disease)  Diverticulitis  HTN (hypertension)  DM (diabetes mellitus)  Liver cirrhosis  Stented coronary artery  S/P CABG (coronary artery bypass graft)  CLL (chronic lymphocytic leukemia)  Hernia  S/P cystoscopy  H/O colostomy  S/P CABG (coronary artery bypass graft)  History of colon resection      MEDICATIONS  (STANDING):  aspirin  chewable 81 milliGRAM(s) Oral daily  cholestyramine Powder (Sugar-Free) 4 Gram(s) Oral two times a day  ciprofloxacin     Tablet 500 milliGRAM(s) Oral <User Schedule>  docusate sodium 100 milliGRAM(s) Oral daily  folic acid 1 milliGRAM(s) Oral daily  heparin  Injectable 5000 Unit(s) SubCutaneous every 12 hours  hydrocortisone 2.5% Cream 1 Application(s) Topical two times a day  insulin glargine Injectable (LANTUS) 9 Unit(s) SubCutaneous every morning  multivitamin 1 Tablet(s) Oral daily  pantoprazole    Tablet 40 milliGRAM(s) Oral before breakfast  spironolactone 100 milliGRAM(s) Oral daily    MEDICATIONS  (PRN):  hydrOXYzine hydrochloride 25 milliGRAM(s) Oral four times a day PRN Itching      Allergies    No Known Allergies    Intolerances        Social History:   Smoking: Yes [ ]  No [ ]   ______pk yrs  ETOH  Yes [ ]  No [ ]  Social [ ]  DRUGS:  Yes [ ]  No [ ]  if so what______________    FAMILY HISTORY:      Physical Exam:   Vital Signs Last 24 Hrs  T(C): 35.6 (26 Mar 2018 14:10), Max: 36.4 (25 Mar 2018 20:49)  T(F): 96.1 (26 Mar 2018 14:10), Max: 97.6 (25 Mar 2018 20:49)  HR: 90 (26 Mar 2018 14:10) (86 - 90)  BP: 166/68 (26 Mar 2018 14:10) (108/61 - 166/68)  BP(mean): --  RR: 20 (26 Mar 2018 14:10) (20 - 20)  SpO2: --      Labs:                         9.2    17.13 )-----------( 136      ( 25 Mar 2018 06:02 )             29.5     03-25    133<L>  |  99  |  18  ----------------------------<  156<H>  3.9   |  20  |  0.9    Ca    8.0<L>      25 Mar 2018 06:02          Pertinent labs:                      9.2    17.13 )-----------( 136      ( 25 Mar 2018 06:02 )             29.5       03-25    133<L>  |  99  |  18  ----------------------------<  156<H>  3.9   |  20  |  0.9    Ca    8.0<L>      25 Mar 2018 06:02      Radiology & Additional Studies:   < from: US Abdomen Limited (03.25.18 @ 15:57) >  FINDINGS/  IMPRESSION:    There is mild to moderate ascites seen within the abdomen and pelvis,   greatest in the right lower quadrant.    < end of copied text >      Radiology imaging reviewed.       ASSESSMENT/ PLAN:   - reviewed abdominal U/S images - not enough fluid to place drain  - recommend therapeutic paracentesis   - plan to be discussed with primary team        Risks, benefits, and alternatives to treatment discussed. All questions answered with understanding.    Thank you for the courtesy of this consult, please call [phone] with any further questions.
h/o cancer, ascites, abdominal wall hernia with recurrent lacerations requiring suture repair and ascites drainage requiring ostomy appliance    PE: clear drainage via abdominal lacerations--> sutures in place, no exposed bowel

## 2018-03-26 NOTE — PROGRESS NOTE ADULT - SUBJECTIVE AND OBJECTIVE BOX
Progress Note: General Surgery  Patient: TOYA SERNA , 52y (1965)Female   MRN: [id number]  Location: [address]  Visit: 03-23-18 Inpatient  Date: 03-26-18 @ 08:59  Hospital Day: 4    Procedure/Diagnosis: large ventral hernia containing bowel with leaking ascites through macerated skin  Events over 24h: abdominal binder in place s/p 2-0 nylon sutures in upper abdomen over leaking defect in skin, xeroform and abd pads, abdomen continues to leak, US shows moderate ascites, no drainage as of yet    Vitals: T(F): 97.4 (03-26-18 @ 05:07), Max: 97.6 (03-25-18 @ 20:49)  HR: 86 (03-26-18 @ 05:07)  BP: 108/61 (03-26-18 @ 05:07) (108/61 - 119/63)  RR: 20 (03-26-18 @ 05:07)    In:   03-25-18 @ 07:01  -  03-26-18 @ 07:00  --------------------------------------------------------  IN: 0 mL    Out:   03-25-18 @ 07:01  -  03-26-18 @ 07:00  --------------------------------------------------------  OUT:    Voided: 3 mL  Total OUT: 3 mL    Net:   03-25-18 @ 07:01  -  03-26-18 @ 07:00  --------------------------------------------------------  NET: -3 mL    Diet: Diet, DASH/TLC:   Sodium & Cholesterol Restricted  Consistent Carbohydrate No Snacks (03-26-18 @ 05:53)  IV Fluids: no, Type: IVL    Physical Examination:  General Appearance: NAD, alert and cooperative, small, frail habitus  HEENT: NCAT, WNL  Heart: S1 and S2. No murmurs. Rhythm is regular   Lungs: Clear to auscultation BL without rales, rhonchi, wheezing, crackles or diminished breath sounds.  Abdomen:  Positive bowel sounds. Soft, distended, nontender. Thin, macerated skin overlying bowel-containing ventral hernia with visual peristalsis through skin, serous fluid leaking through small defect in skin, nylon suture in place over another defect with no active drainage  Skin: Warm/dry, Normal color, No jaundice.   Incisions/Wounds: Dressings in place, wet with serous drainage from abdominal ascites, no signs of infection    Medications:  aspirin  chewable 81 milliGRAM(s) Oral daily  cholestyramine Powder (Sugar-Free) 4 Gram(s) Oral two times a day  docusate sodium 100 milliGRAM(s) Oral daily  folic acid 1 milliGRAM(s) Oral daily  hydrocortisone 2.5% Cream 1 Application(s) Topical two times a day  hydrOXYzine hydrochloride 25 milliGRAM(s) Oral four times a day PRN Itching  insulin glargine Injectable (LANTUS) 9 Unit(s) SubCutaneous every morning  multivitamin 1 Tablet(s) Oral daily  spironolactone 50 milliGRAM(s) Oral daily    DVT Prophylaxis: heparin  Injectable 5000 Unit(s) SubCutaneous every 12 hours  GI Prophylaxis: pantoprazole    Tablet 40 milliGRAM(s) Oral before breakfast  Antibiotics: ciprofloxacin     Tablet 500 milliGRAM(s) Oral <User Schedule>    Labs:                        9.2    17.13 )-----------( 136      ( 25 Mar 2018 06:02 )             29.5     03-25    133<L>  |  99  |  18  ----------------------------<  156<H>  3.9   |  20  |  0.9    Ca    8.0<L>      25 Mar 2018 06:02    TPro  4.9<L>  /  Alb  3.0<L>  /  TBili  5.6<H>  /  DBili  x   /  AST  172<H>  /  ALT  218<H>  /  AlkPhos  >1200<H>  03-24    Imaging:  < from: US Abdomen Limited (03.25.18 @ 15:57) >  FINDINGS/  IMPRESSION:  There is mild to moderate ascites seen within the abdomen and pelvis,   greatest in the right lower quadrant.  < end of copied text >

## 2018-03-26 NOTE — PROGRESS NOTE ADULT - SUBJECTIVE AND OBJECTIVE BOX
ANGELESTOYA WATTS  52y  Female  ***My note supercedes ALL resident notes that I sign.  My corrections for their notes are in my note.***    INTERVAL EVENTS: Had long conversation w/ pt and sister.  Also, discussed case w/ surg resid and wound care RN ([place]). Pt is comfortable, just trying to control leak.    T(F): 97.4 (03-26-18 @ 05:07), Max: 97.6 (03-25-18 @ 20:49)  HR: 86 (03-26-18 @ 05:07) (86 - 86)  BP: 108/61 (03-26-18 @ 05:07) (108/61 - 119/63)  RR: 20 (03-26-18 @ 05:07) (20 - 20)  SpO2: --    PHYSICAL EXAM:  GEN: No acute distress  LUNGS: Clear to auscultation bilaterally   HEART: S1/S2 present. RRR.   ABD: Soft, distended - but old finding (has hernia issue); there are 2 areas that are sutured: one is centrally/midline and the other is more lateral, toward right  lower quad; seems leak is coming more from central area.  A colostomy bag was placed around the sutured areas.  The RLQ suturing was not leaking.  Further, TIGHT abd binding will ENCOURAGE MORE leaking because of increased intraabd pressure.  A loose binder OR supportive underwear would be better.  EXT: no edema  NEURO: AAOX3    LABS:                        9.2     (    91.3   17.13 )-----------( ---------      136      ( 25 Mar 2018 06:02 )             29.5    (    13.8     RADIOLOGY & ADDITIONAL TESTS:  < from: US Abdomen Limited (03.25.18 @ 15:57) >  IMPRESSION:    There is mild to moderate ascites seen within the abdomen and pelvis,   greatest in the right lower quadrant.    < end of copied text >     NOT ENOUGH FLUID TO TAP.        ciprofloxacin     Tablet 500 milliGRAM(s) Oral <User Schedule>    aspirin  chewable 81 milliGRAM(s) Oral daily  cholestyramine Powder (Sugar-Free) 4 Gram(s) Oral two times a day  docusate sodium 100 milliGRAM(s) Oral daily  folic acid 1 milliGRAM(s) Oral daily  heparin  Injectable 5000 Unit(s) SubCutaneous every 12 hours  hydrocortisone 2.5% Cream 1 Application(s) Topical two times a day  hydrOXYzine hydrochloride 25 milliGRAM(s) Oral four times a day PRN  insulin glargine Injectable (LANTUS) 9 Unit(s) SubCutaneous every morning  multivitamin 1 Tablet(s) Oral daily  pantoprazole    Tablet 40 milliGRAM(s) Oral before breakfast  spironolactone 50 milliGRAM(s) Oral daily

## 2018-03-26 NOTE — PROGRESS NOTE ADULT - ASSESSMENT
#)End Stage Liver Disease/liver cirrhosis/ ascitis  increase aldactone to 100mg q24  holding furosemide because of low BP, but this could be added back in the future to 20-40mg po q24  -was taking cipro for SBP prophylaxis - can resume at 500mg 3x/wk  -s/p suturing of abdominal wall dehiscense by surgery  osteomy bag in place to capture leak; do not overly bind abdomen  can use midodrine if hypoTN develops - avoid IVFs and NO IV PRESSOR AGENTS  LFTs are very abnl and this cannot be altered - so need to follow labs    # Pruritis  Face - hydrocort 2% cr q12 top to face for scaling seborrhea  use moisturizer to body q12  can use topical lidocaine to any painful areas on skin  increase cholestyramine 4gm to q8h  can cont hydroxyzine 25mg po q6h prn itch - though antihistamine not too likely to help    #)Leukocytosis sec to CLL  No fever, no need for infection work up.  see no reason to do labs as pt is on hospice and this cannot be treated  WBC will always be abnl    #) cad/ htn/   - c/w aspirin    # dm type 2  -monitor FS just 1x/day; IF > 200, will treat  - diabetic diet    #)Chronic hyponatremia sec to liver disease  do not salt restrict in diet    #)Anxiety  Ativan 1mg oral daily or more as needed    #)Activity  Ambulate with assistance    #) dvt ppx/GI PPX  - Hep Sc, Protonix    #) Dispo:  - Home hospice, follow up hospice consult.  - should limit labs  - would anticipate d/c home tomorrow    #)Code status:  only DNR, NO DNI

## 2018-03-26 NOTE — CONSULT NOTE ADULT - ASSESSMENT
53 yo F with ascites, leaking of 1 cm abdominal wound s/p bedside stiches x 2    Plan  Hospice admit  No dressing change until falls off  Evaluated with Dr. Bergman
abdominal hernia and lacerations-->     rec: cont. current wound care/ ostomy appliance

## 2018-03-26 NOTE — PROGRESS NOTE ADULT - SUBJECTIVE AND OBJECTIVE BOX
SUBJECTIVE:    Patient is a 52y old Female who presents with a chief complaint of Leakage of ascitic fluid from abdominal wound (24 Mar 2018 02:07)    Currently admitted to medicine with the primary diagnosis of Hernia     Today is hospital day 3d. This morning she is resting comfortably in bed and reports no new issues or overnight events.     PAST MEDICAL & SURGICAL HISTORY  Psoriasis  HTN (hypertension)  CAD (coronary artery disease)  Diverticulitis  HTN (hypertension)  DM (diabetes mellitus)  Liver cirrhosis  Stented coronary artery  S/P CABG (coronary artery bypass graft)  CLL (chronic lymphocytic leukemia)  Hernia  S/P cystoscopy  H/O colostomy  S/P CABG (coronary artery bypass graft)  History of colon resection    SOCIAL HISTORY:  Negative for smoking/alcohol/drug use.     ALLERGIES:  No Known Allergies    MEDICATIONS:  STANDING MEDICATIONS  aspirin  chewable 81 milliGRAM(s) Oral daily  cholestyramine Powder (Sugar-Free) 4 Gram(s) Oral two times a day  ciprofloxacin     Tablet 500 milliGRAM(s) Oral <User Schedule>  docusate sodium 100 milliGRAM(s) Oral daily  folic acid 1 milliGRAM(s) Oral daily  heparin  Injectable 5000 Unit(s) SubCutaneous every 12 hours  hydrocortisone 2.5% Cream 1 Application(s) Topical two times a day  insulin glargine Injectable (LANTUS) 9 Unit(s) SubCutaneous every morning  multivitamin 1 Tablet(s) Oral daily  pantoprazole    Tablet 40 milliGRAM(s) Oral before breakfast  spironolactone 50 milliGRAM(s) Oral daily    PRN MEDICATIONS  hydrOXYzine hydrochloride 25 milliGRAM(s) Oral four times a day PRN    VITALS:   T(F): 97.4  HR: 86  BP: 108/61  RR: 20  SpO2: 96%    LABS:                        9.2    17.13 )-----------( 136      ( 25 Mar 2018 06:02 )             29.5     03-25    133<L>  |  99  |  18  ----------------------------<  156<H>  3.9   |  20  |  0.9    Ca    8.0<L>      25 Mar 2018 06:02    TPro  4.9<L>  /  Alb  3.0<L>  /  TBili  5.6<H>  /  DBili  x   /  AST  172<H>  /  ALT  218<H>  /  AlkPhos  >1200<H>  03-24    PT/INR - ( 24 Mar 2018 09:06 )   PT: 11.40 sec;   INR: 1.05 ratio         PTT - ( 24 Mar 2018 09:06 )  PTT:25.9 sec      RADIOLOGY:  US (03/13): Trace ascites is noted.       PHYSICAL EXAM:  GEN: No acute distress  LUNGS: Clear to auscultation bilaterally   HEART: S1/S2 present. RRR.   ABD: Soft, non-tender, large ventral hernia. Bowel sounds present, abdominal bandage noted  EXT: NC/NC/NE/2+PP/SANCHEZ  NEURO: AAOX3 SUBJECTIVE:    Patient is a 52y old Female who presents with a chief complaint of Leakage of ascitic fluid from abdominal wound (24 Mar 2018 02:07)    Currently admitted to medicine with the primary diagnosis of Hernia     Today is hospital day 3d. This morning she is resting comfortably in bed and reports no new issues or overnight events. She reports that the abdominal bandage has been leaking.     PAST MEDICAL & SURGICAL HISTORY  Psoriasis  HTN (hypertension)  CAD (coronary artery disease)  Diverticulitis  HTN (hypertension)  DM (diabetes mellitus)  Liver cirrhosis  Stented coronary artery  S/P CABG (coronary artery bypass graft)  CLL (chronic lymphocytic leukemia)  Hernia  S/P cystoscopy  H/O colostomy  S/P CABG (coronary artery bypass graft)  History of colon resection    SOCIAL HISTORY:  Negative for smoking/alcohol/drug use.     ALLERGIES:  No Known Allergies    MEDICATIONS:  STANDING MEDICATIONS  aspirin  chewable 81 milliGRAM(s) Oral daily  cholestyramine Powder (Sugar-Free) 4 Gram(s) Oral two times a day  ciprofloxacin     Tablet 500 milliGRAM(s) Oral <User Schedule>  docusate sodium 100 milliGRAM(s) Oral daily  folic acid 1 milliGRAM(s) Oral daily  heparin  Injectable 5000 Unit(s) SubCutaneous every 12 hours  hydrocortisone 2.5% Cream 1 Application(s) Topical two times a day  insulin glargine Injectable (LANTUS) 9 Unit(s) SubCutaneous every morning  multivitamin 1 Tablet(s) Oral daily  pantoprazole    Tablet 40 milliGRAM(s) Oral before breakfast  spironolactone 50 milliGRAM(s) Oral daily    PRN MEDICATIONS  hydrOXYzine hydrochloride 25 milliGRAM(s) Oral four times a day PRN    VITALS:   T(F): 97.4  HR: 86  BP: 108/61  RR: 20  SpO2: 96%    LABS:                        9.2    17.13 )-----------( 136      ( 25 Mar 2018 06:02 )             29.5     03-25    133<L>  |  99  |  18  ----------------------------<  156<H>  3.9   |  20  |  0.9    Ca    8.0<L>      25 Mar 2018 06:02    TPro  4.9<L>  /  Alb  3.0<L>  /  TBili  5.6<H>  /  DBili  x   /  AST  172<H>  /  ALT  218<H>  /  AlkPhos  >1200<H>  03-24    PT/INR - ( 24 Mar 2018 09:06 )   PT: 11.40 sec;   INR: 1.05 ratio         PTT - ( 24 Mar 2018 09:06 )  PTT:25.9 sec      RADIOLOGY:  US (03/13): Trace ascites is noted.       PHYSICAL EXAM:  GEN: No acute distress  LUNGS: Clear to auscultation bilaterally   HEART: S1/S2 present. RRR.   ABD: Soft, non-tender, large ventral hernia. Bowel sounds present, abdominal binder  EXT: NC/NC/NE/2+PP/SANCHEZ  NEURO: AAOX3 SUBJECTIVE:    Patient is a 52y old Female who presents with a chief complaint of Leakage of ascitic fluid from abdominal wound (24 Mar 2018 02:07)    Currently admitted to medicine with the primary diagnosis of Hernia     Today is hospital day 3d. This morning she is resting comfortably in bed and reports no new issues or overnight events. She reports abdominal bandage has been leaking.     PAST MEDICAL & SURGICAL HISTORY  Psoriasis  HTN (hypertension)  CAD (coronary artery disease)  Diverticulitis  HTN (hypertension)  DM (diabetes mellitus)  Liver cirrhosis  Stented coronary artery  S/P CABG (coronary artery bypass graft)  CLL (chronic lymphocytic leukemia)  Hernia  S/P cystoscopy  H/O colostomy  S/P CABG (coronary artery bypass graft)  History of colon resection    SOCIAL HISTORY:  Negative for smoking/alcohol/drug use.     ALLERGIES:  No Known Allergies    MEDICATIONS:  STANDING MEDICATIONS  aspirin  chewable 81 milliGRAM(s) Oral daily  cholestyramine Powder (Sugar-Free) 4 Gram(s) Oral two times a day  ciprofloxacin     Tablet 500 milliGRAM(s) Oral <User Schedule>  docusate sodium 100 milliGRAM(s) Oral daily  folic acid 1 milliGRAM(s) Oral daily  heparin  Injectable 5000 Unit(s) SubCutaneous every 12 hours  hydrocortisone 2.5% Cream 1 Application(s) Topical two times a day  insulin glargine Injectable (LANTUS) 9 Unit(s) SubCutaneous every morning  multivitamin 1 Tablet(s) Oral daily  pantoprazole    Tablet 40 milliGRAM(s) Oral before breakfast  spironolactone 50 milliGRAM(s) Oral daily    PRN MEDICATIONS  hydrOXYzine hydrochloride 25 milliGRAM(s) Oral four times a day PRN    VITALS:   T(F): 97.4  HR: 86  BP: 108/61  RR: 20  SpO2: 96%    LABS:                        9.2    17.13 )-----------( 136      ( 25 Mar 2018 06:02 )             29.5     03-25    133<L>  |  99  |  18  ----------------------------<  156<H>  3.9   |  20  |  0.9    Ca    8.0<L>      25 Mar 2018 06:02    TPro  4.9<L>  /  Alb  3.0<L>  /  TBili  5.6<H>  /  DBili  x   /  AST  172<H>  /  ALT  218<H>  /  AlkPhos  >1200<H>  03-24    PT/INR - ( 24 Mar 2018 09:06 )   PT: 11.40 sec;   INR: 1.05 ratio      PTT - ( 24 Mar 2018 09:06 )  PTT:25.9 sec      RADIOLOGY:  US Abdomen (03/25): There is mild to moderate ascites seen within the abdomen and pelvis, greatest in the right lower quadrant.  US (03/13): Trace ascites is noted.     PHYSICAL EXAM:  GEN: No acute distress  LUNGS: Clear to auscultation bilaterally   HEART: S1/S2 present. RRR.   ABD: Soft, non-tender, large ventral hernia. Bowel sounds present, abdominal binder  EXT: NC/NC/NE/2+PP/SANCHEZ  NEURO: AAOX3 SUBJECTIVE:    Patient is a 52y old Female who presents with a chief complaint of Leakage of ascitic fluid from abdominal wound (24 Mar 2018 02:07)    Currently admitted to medicine with the primary diagnosis of Hernia     Today is hospital day 3d. This morning she is resting comfortably in bed and reports no new issues or overnight events. She reports abdominal bandage has been leaking.     PAST MEDICAL & SURGICAL HISTORY  Psoriasis  HTN (hypertension)  CAD (coronary artery disease)  Diverticulitis  HTN (hypertension)  DM (diabetes mellitus)  Liver cirrhosis  Stented coronary artery  S/P CABG (coronary artery bypass graft)  CLL (chronic lymphocytic leukemia)  Hernia  S/P cystoscopy  H/O colostomy  S/P CABG (coronary artery bypass graft)  History of colon resection    SOCIAL HISTORY:  Negative for smoking/alcohol/drug use.     ALLERGIES:  No Known Allergies    MEDICATIONS:  STANDING MEDICATIONS  aspirin  chewable 81 milliGRAM(s) Oral daily  cholestyramine Powder (Sugar-Free) 4 Gram(s) Oral two times a day  ciprofloxacin     Tablet 500 milliGRAM(s) Oral <User Schedule>  docusate sodium 100 milliGRAM(s) Oral daily  folic acid 1 milliGRAM(s) Oral daily  heparin  Injectable 5000 Unit(s) SubCutaneous every 12 hours  hydrocortisone 2.5% Cream 1 Application(s) Topical two times a day  insulin glargine Injectable (LANTUS) 9 Unit(s) SubCutaneous every morning  multivitamin 1 Tablet(s) Oral daily  pantoprazole    Tablet 40 milliGRAM(s) Oral before breakfast  spironolactone 50 milliGRAM(s) Oral daily    PRN MEDICATIONS  hydrOXYzine hydrochloride 25 milliGRAM(s) Oral four times a day PRN    VITALS:   T(F): 97.4  HR: 86  BP: 108/61  RR: 20  SpO2: 96%    LABS:                        9.2    17.13 )-----------( 136      ( 25 Mar 2018 06:02 )             29.5     03-25    133<L>  |  99  |  18  ----------------------------<  156<H>  3.9   |  20  |  0.9    Ca    8.0<L>      25 Mar 2018 06:02    TPro  4.9<L>  /  Alb  3.0<L>  /  TBili  5.6<H>  /  DBili  x   /  AST  172<H>  /  ALT  218<H>  /  AlkPhos  >1200<H>  03-24    PT/INR - ( 24 Mar 2018 09:06 )   PT: 11.40 sec;   INR: 1.05 ratio      PTT - ( 24 Mar 2018 09:06 )  PTT:25.9 sec      RADIOLOGY:  US Abdomen (03/25): There is mild to moderate ascites seen within the abdomen and pelvis, greatest in the right lower quadrant.  US (03/13): Trace ascites is noted.     PHYSICAL EXAM:  GEN: No acute distress  LUNGS: Clear to auscultation bilaterally   HEART: S1/S2 present. RRR.   ABD: Soft, non-tender, distended, bowel sounds present, abdominal binder, osteomy bag in place  EXT: NC/NC/NE/2+PP/SANCHEZ  NEURO: AAOX3

## 2018-03-26 NOTE — PROGRESS NOTE ADULT - ASSESSMENT
Assessment:  52y Female patient admitted with large ventral hernia containing bowel with leaking ascites through macerated skin; abdominal binder in place s/p 2-0 nylon sutures in upper abdomen over leaking defect in skin, xeroform and abd pads, abdomen continues to leak, US shows moderate ascites, no drainage as of yet, with the above physical exam, labs, and imaging findings.    Plan:  C/W dressing changes PRN  will suture as needed to control leak  US findings as above, f/u medicine team for paracentesis  F/U labs  replete electrolytes  consider IVF for volume replacement if leaking substantial amount  F/U burn C/S placed over weekend    Date/Time: 03-26-18 @ 08:59

## 2018-03-26 NOTE — CONSULT NOTE ADULT - ATTENDING COMMENTS
recommend therapeutic paracentesis.   this will reduce amount of fluid/pressure coming through hernia.
Pt with cryptogenic cirrhosis and liver failure.   Pt with Chronic Leukemia and unable to get liver transplant.  Pt has massive hernia which has been deemed un operable by many surgeons.   Pt has been on Hospice.    She presents with leaking ascites : Anterior Abdominal wall sutured

## 2018-03-26 NOTE — PROGRESS NOTE ADULT - ASSESSMENT
51 yo F with PMH of CAD, liver cirrhosis (idiopathic ?), CLL, ascites, s/p Hartmans and reversal for complicated diverticulitis and colovesicular fistula 3-4 years ago, has incisional hernia associated with previous parastomal site, large, nonobstructive, not surgical candidate, currently in hospice presented to the hospital for small leaking wound over incisional hernia site.    #)End Stage Liver Disease/liver cirrhosis/ ascitis  - c/w aldactone; D/C furosemide  - Blood culture: negative (final)  - Continue ciprofloxacin 500mg 3x/wk  - c/w hospice care  - Sx following: s/p suturing of abdominal wall dehiscence by surgery  - Start midodrine if hypotensive  - avoid IVFs and NO IV PRESSOR AGENTS    #) Pruritis  - Face - hydrocort 2% cr q12 top to face for scaling seborrhea  - use moisturizer to body q12  - can use topical lidocaine to any painful areas on skin  - Continue cholestyramine 4gm q12 and increase as tolerated  - can cont hydroxyzine 25mg po q6h prn itch    #)Leukocytosis sec to CLL  - No fever, no need for infection work up.  - Follow up AM labs.    #) CAD / HTN / DM  - c/w aspirin  - monitor Blood pressure  - monitor FS keep <180 resume insulin if >180    #)Chronic hyponatremia sec to liver disease  - Resolved  - Monitor BMP and electrolytes    #)Anxiety  - Ativan 1mg oral daily    #)Diet  - Carb consistent    #)Activity  - Ambulate with assistance    #) Dvt ppx/GI PPX  - Hep Sc, Protonix    #) Dispo:  - Home hospice  - Follow up hospice consult  - D/C to home today    #)Code status:  - only DNR, NO DNI 53 yo F with PMH of CAD, liver cirrhosis (idiopathic ?), CLL, ascites, s/p Hartmans and reversal for complicated diverticulitis and colovesicular fistula 3-4 years ago, has incisional hernia associated with previous parastomal site, large, nonobstructive, not surgical candidate, currently in hospice presented to the hospital for small leaking wound over incisional hernia site.    #)End Stage Liver Disease/liver cirrhosis/ ascitis  - c/w aldactone; D/C furosemide  - Blood culture: negative (final)  - Continue ciprofloxacin 500mg 3x/wk  - c/w hospice care  - Sx following: s/p suturing of abdominal wall dehiscence by surgery  - Start midodrine if hypotensive  - avoid IVFs and NO IV PRESSOR AGENTS  - Consider colostomy bag for drainage     #) Pruritis  - Face - hydrocort 2% cr q12 top to face for scaling seborrhea  - use moisturizer to body q12  - can use topical lidocaine to any painful areas on skin  - Continue cholestyramine 4gm q12 and increase as tolerated  - can cont hydroxyzine 25mg po q6h prn itch    #)Leukocytosis sec to CLL  - No fever, no need for infection work up.  - Follow up AM labs.    #) CAD / HTN / DM  - c/w aspirin  - monitor Blood pressure  - monitor FS keep <180 resume insulin if >180    #)Chronic hyponatremia sec to liver disease  - Resolved  - Monitor BMP and electrolytes    #)Anxiety  - Ativan 1mg oral daily    #)Diet  - Carb consistent    #)Activity  - Ambulate with assistance    #) Dvt ppx/GI PPX  - Hep Sc, Protonix    #) Dispo:  - Home hospice  - Follow up hospice consult  - D/C to home today    #)Code status:  - only DNR, NO DNI 53 yo F with PMH of CAD, liver cirrhosis (idiopathic ?), CLL, ascites, s/p Hartmans and reversal for complicated diverticulitis and colovesicular fistula 3-4 years ago, has incisional hernia associated with previous parastomal site, large, nonobstructive, not surgical candidate, currently in hospice presented to the hospital for small leaking wound over incisional hernia site.    #)End Stage Liver Disease/liver cirrhosis/ ascitis  - c/w aldactone; D/C furosemide  - Blood culture: negative (final)  - Continue ciprofloxacin 500mg 3x/wk  - c/w hospice care  - Sx following: s/p suturing of abdominal wall dehiscence by surgery  - Start midodrine if hypotensive  - avoid IVFs and NO IV PRESSOR AGENTS  - Consider colostomy bag for drainage   - F/U Burn consult    #) Pruritis  - Face - hydrocort 2% cr q12 top to face for scaling seborrhea  - use moisturizer to body q12  - can use topical lidocaine to any painful areas on skin  - Continue cholestyramine 4gm q12 and increase as tolerated  - can cont hydroxyzine 25mg po q6h prn itch    #)Leukocytosis sec to CLL  - No fever, no need for infection work up.  - Follow up AM labs.    #) CAD / HTN / DM  - c/w aspirin  - monitor Blood pressure  - monitor FS keep <180 resume insulin if >180    #)Chronic hyponatremia sec to liver disease  - Resolved  - Monitor BMP and electrolytes    #)Anxiety  - Ativan 1mg oral daily    #)Diet  - Carb consistent    #)Activity  - Ambulate with assistance    #) Dvt ppx/GI PPX  - Hep Sc, Protonix    #) Dispo:  - Anticipated for tomorrow to Home hospice  - Follow up hospice consult    #)Code status:  - only DNR, NO DNI 51 yo F with PMH of CAD, liver cirrhosis (idiopathic ?), CLL, ascites, s/p Hartmans and reversal for complicated diverticulitis and colovesicular fistula 3-4 years ago, has incisional hernia associated with previous parastomal site, large, nonobstructive, not surgical candidate, currently in hospice presented to the hospital for small leaking wound over incisional hernia site.    #)End Stage Liver Disease/liver cirrhosis/ ascitis  - c/w aldactone (dose increased to 100mg PO q24); D/C furosemide for hypotension  - Blood culture: negative (final)  - Continue ciprofloxacin 500mg 3x/wk  - Sx following: s/p suturing of abdominal wall dehiscence by surgery  - Osteomy bag in place to capture leak; do not overly bind abdomen  - Start midodrine if hypotensive  - avoid IVFs and NO IV PRESSOR AGENTS  - F/U Burn consult    #) Pruritis  - Face - hydrocort 2% cr q12 top to face for scaling seborrhea  - use moisturizer to body q12  - can use topical lidocaine to any painful areas on skin  - Continue cholestyramine 4gm q12 and increase as tolerated  - can cont hydroxyzine 25mg po q6h prn itch    #)Leukocytosis sec to CLL  - No fever, no need for infection work up.  - Follow up AM labs.    #) CAD / HTN / DM  - c/w aspirin  - monitor Blood pressure  - monitor FS keep <180 resume insulin if >180    #)Chronic hyponatremia sec to liver disease  - Resolved  - Monitor BMP and electrolytes    #)Anxiety  - Ativan 1mg oral daily    #)Diet  - Carb consistent    #)Activity  - Ambulate with assistance    #) Dvt ppx/GI PPX  - Hep Sc, Protonix    #) Dispo:  - Anticipated for tomorrow to Home hospice  - Follow up hospice consult    #)Code status:  - only DNR, NO DNI

## 2018-03-27 RX ORDER — CHOLESTYRAMINE 4 G/9G
4 POWDER, FOR SUSPENSION ORAL EVERY 8 HOURS
Qty: 0 | Refills: 0 | Status: DISCONTINUED | OUTPATIENT
Start: 2018-03-27 | End: 2018-03-28

## 2018-03-27 RX ORDER — INSULIN LISPRO 100/ML
10 VIAL (ML) SUBCUTANEOUS ONCE
Qty: 0 | Refills: 0 | Status: COMPLETED | OUTPATIENT
Start: 2018-03-27 | End: 2018-03-27

## 2018-03-27 RX ORDER — INSULIN LISPRO 100/ML
8 VIAL (ML) SUBCUTANEOUS ONCE
Qty: 0 | Refills: 0 | Status: COMPLETED | OUTPATIENT
Start: 2018-03-27 | End: 2018-03-27

## 2018-03-27 RX ADMIN — Medication 8 UNIT(S): at 17:48

## 2018-03-27 RX ADMIN — CHOLESTYRAMINE 4 GRAM(S): 4 POWDER, FOR SUSPENSION ORAL at 13:05

## 2018-03-27 RX ADMIN — Medication 100 MILLIGRAM(S): at 13:05

## 2018-03-27 RX ADMIN — Medication 81 MILLIGRAM(S): at 13:05

## 2018-03-27 RX ADMIN — SPIRONOLACTONE 100 MILLIGRAM(S): 25 TABLET, FILM COATED ORAL at 05:56

## 2018-03-27 RX ADMIN — CHOLESTYRAMINE 4 GRAM(S): 4 POWDER, FOR SUSPENSION ORAL at 21:30

## 2018-03-27 RX ADMIN — Medication 1 APPLICATION(S): at 17:15

## 2018-03-27 RX ADMIN — Medication 10 UNIT(S): at 13:05

## 2018-03-27 RX ADMIN — Medication 1 APPLICATION(S): at 05:55

## 2018-03-27 RX ADMIN — PANTOPRAZOLE SODIUM 40 MILLIGRAM(S): 20 TABLET, DELAYED RELEASE ORAL at 08:10

## 2018-03-27 RX ADMIN — INSULIN GLARGINE 9 UNIT(S): 100 INJECTION, SOLUTION SUBCUTANEOUS at 08:10

## 2018-03-27 RX ADMIN — Medication 1 MILLIGRAM(S): at 13:05

## 2018-03-27 RX ADMIN — CHOLESTYRAMINE 4 GRAM(S): 4 POWDER, FOR SUSPENSION ORAL at 08:11

## 2018-03-27 RX ADMIN — Medication 1 TABLET(S): at 13:05

## 2018-03-27 NOTE — PROGRESS NOTE ADULT - SUBJECTIVE AND OBJECTIVE BOX
Interventional Radiology Follow- Up Note    52y Female w/ascites    O/N:   No complaints offered.    Vitals: T(F): 98 (03-27-18 @ 04:30), Max: 98 (03-27-18 @ 04:30)  HR: 92 (03-27-18 @ 04:30) (86 - 92)  BP: 114/65 (03-27-18 @ 04:30) (111/59 - 166/68)  RR: 18 (03-27-18 @ 04:30) (18 - 20)  SpO2: --  Wt(kg): --    Gen: awake, NAD  Abd:     A/P - 51 y/o F w/ascites, IR consulted for possible therapeutic paracentesis    1. Ascites - discussed with night resident [phone]; patient would most benefit from paracentesis. Placing a drainage catheter would put the patient at risk for spontaneous bacterial peritonitis. Labs reviewed and patient amenable to paracentesis, IR would be able to do procedure today if primary team is agreeable to plan.     Please call Interventional Radiology [phone] with any questions, concerns, or issues regarding above. Interventional Radiology Follow- Up Note    52y Female w/ascites    O/N:   No complaints offered.    Vitals: T(F): 98 (03-27-18 @ 04:30), Max: 98 (03-27-18 @ 04:30)  HR: 92 (03-27-18 @ 04:30) (86 - 92)  BP: 114/65 (03-27-18 @ 04:30) (111/59 - 166/68)  RR: 18 (03-27-18 @ 04:30) (18 - 20)  SpO2: --  Wt(kg): --    Gen: awake, NAD  Abd: previous laparotomy incision, distended w/palpable fluid wave    A/P - 51 y/o F w/ascites, IR consulted for possible therapeutic paracentesis    1. Ascites - discussed with night resident [phone]; patient would most benefit from paracentesis. Placing a drainage catheter would put the patient at risk for spontaneous bacterial peritonitis. Labs reviewed and patient amenable to paracentesis, IR would be able to do procedure today if primary team is agreeable to plan.     Please call Interventional Radiology [phone] with any questions, concerns, or issues regarding above. Interventional Radiology Follow- Up Note    52y Female w/ascites    O/N:   No complaints offered.    Vitals: T(F): 98 (03-27-18 @ 04:30), Max: 98 (03-27-18 @ 04:30)  HR: 92 (03-27-18 @ 04:30) (86 - 92)  BP: 114/65 (03-27-18 @ 04:30) (111/59 - 166/68)  RR: 18 (03-27-18 @ 04:30) (18 - 20)  SpO2: --  Wt(kg): --    Gen: awake, NAD  Abd: previous laparotomy incision, distended w/palpable fluid wave    A/P - 53 y/o F w/ascites, IR consulted for possible therapeutic paracentesis    1. Ascites - discussed with night resident [phone]; patient would most benefit from paracentesis. Placing a drainage catheter would put the patient at risk for spontaneous bacterial peritonitis. Labs reviewed and patient would like to speak to sister before formally consenting to procedure, IR would be able to do procedure today if primary team and patient are agreeable to plan.     Please call Interventional Radiology [phone] with any questions, concerns, or issues regarding above. Interventional Radiology Follow- Up Note    52y Female w/ascites    S - reports some drainage from previous surgical incisions    Vitals: T(F): 98 (03-27-18 @ 04:30), Max: 98 (03-27-18 @ 04:30)  HR: 92 (03-27-18 @ 04:30) (86 - 92)  BP: 114/65 (03-27-18 @ 04:30) (111/59 - 166/68)  RR: 18 (03-27-18 @ 04:30) (18 - 20)  SpO2: --  Wt(kg): --    Gen: awake, NAD  Abd: previous laparotomy incision, distended w/palpable fluid wave    A/P - 53 y/o F w/ascites, IR consulted for possible therapeutic paracentesis    1. Ascites - discussed with night resident [phone]; patient would most benefit from paracentesis. Placing a drainage catheter would put the patient at risk for spontaneous bacterial peritonitis. Labs reviewed and patient would like to speak to sister before formally consenting to procedure, IR would be able to do procedure today if primary team and patient are agreeable to plan.     Please call Interventional Radiology [phone] with any questions, concerns, or issues regarding above.

## 2018-03-27 NOTE — PROGRESS NOTE ADULT - ASSESSMENT
61 y/o female with leaking skin maceration s/p multiple bedside stitches. Ostomy appliance in place with 700 cc of serous drainage overnight.    Plan: attending to discuss treatment plan with primary team attending.

## 2018-03-27 NOTE — PROGRESS NOTE ADULT - SUBJECTIVE AND OBJECTIVE BOX
Saw patient again this morning. Patient has not had a conversation with her sister regarding paracentesis. If procedure is desired, please re-consult IR.

## 2018-03-27 NOTE — PROGRESS NOTE ADULT - ASSESSMENT
#)End Stage Liver Disease/liver cirrhosis/ ascitis  increase aldactone to 100mg q24  holding furosemide because of low BP, but this could be added back in the future to 20-40mg po q24 as outpt  -was taking cipro for SBP prophylaxis - can resume at 500mg 3x/wk  -s/p suturing of abdominal wall by surgery  osteomy bag in place to capture leak; do not overly bind abdomen  can use midodrine if hypoTN develops - avoid IVFs and NO IV PRESSOR AGENTS  LFTs are very abnl and this cannot be altered - so need to follow labs  Burn noted - they agree with current plan  need scheduled visits w/ IR every 2-3 wks for therap paracentesis as needed  I am against a [place] cath at this point because it would have to be permanent as ascites is permanent feature of her cirrhosis and she might leak around that tube or develop SBP.  Pulling out the [place] cath might also create a permanent hole that doesn't close. I would think about a [place] cath only when she needs IR paracentesis more frequently than 1x/wk.    # Pruritis  Face - hydrocort 2% cr q12 top to face for scaling seborrhea  use moisturizer to body q12  can use topical lidocaine to any painful areas on skin  increase cholestyramine 4gm to q8h - need to avoid dosing this med w/ her other meds, to avoid binding the active ingredients  can cont hydroxyzine 25mg po q6h prn itch - though antihistamine not too likely to help    #)Leukocytosis sec to CLL  No fever, no need for infection work up.  see no reason to do labs as pt is on hospice and this cannot be treated  WBC will always be abnl    #) cad/ htn/   - c/w aspirin    # dm type 2  -monitor FS just 1x/day; IF > 200, will treat  - diabetic diet    #)Chronic hyponatremia sec to liver disease  do not salt restrict in diet    #)Anxiety  Ativan 1mg oral daily or more as needed    #)Activity  Ambulate with assistance    #) dvt ppx/GI PPX  - Hep Sc, Protonix    #) Dispo:  I see no reason for further hospitalization  - should limit labs  pt can go home today, f/u with pt's sister    #)Code status:  only DNR, NO DNI

## 2018-03-27 NOTE — PROGRESS NOTE ADULT - SUBJECTIVE AND OBJECTIVE BOX
TOYA SERNA  52y Female   [id number]    Hospital Day: 5  Post Operative Day:    Procedure/dx: leaking skin maceration s/p bedside stitches  Events of the Last 24h: ostomy appliance applied over abdominal wound    Patient is a 52y old  Female who presents with a chief complaint of Leakage of ascitic fluid from abdominal wound (24 Mar 2018 02:07)    PAST MEDICAL & SURGICAL HISTORY:  Psoriasis  HTN (hypertension)  CAD (coronary artery disease)  Diverticulitis  HTN (hypertension)  DM (diabetes mellitus)  Liver cirrhosis  Stented coronary artery  S/P CABG (coronary artery bypass graft)  CLL (chronic lymphocytic leukemia)  Hernia  S/P cystoscopy  H/O colostomy  S/P CABG (coronary artery bypass graft)  History of colon resection      Vital Signs Last 24 Hrs  T(C): 36.2 (26 Mar 2018 21:35), Max: 36.2 (26 Mar 2018 21:35)  T(F): 97.1 (26 Mar 2018 21:35), Max: 97.1 (26 Mar 2018 21:35)  HR: 86 (26 Mar 2018 21:35) (86 - 90)  BP: 111/59 (26 Mar 2018 21:35) (111/59 - 166/68)  BP(mean): --  RR: 18 (26 Mar 2018 21:35) (18 - 20)  SpO2: --        Diet, DASH/TLC:   Sodium & Cholesterol Restricted  Consistent Carbohydrate No Snacks (03-26-18 @ 05:53)      I&O's Summary    25 Mar 2018 07:01  -  26 Mar 2018 07:00  --------------------------------------------------------  IN: 0 mL / OUT: 3 mL / NET: -3 mL    26 Mar 2018 07:01  -  27 Mar 2018 05:31  --------------------------------------------------------  IN: 0 mL / OUT: 200 mL / NET: -200 mL     I&O's Detail    25 Mar 2018 07:01  -  26 Mar 2018 07:00  --------------------------------------------------------  IN:  Total IN: 0 mL    OUT:    Voided: 3 mL  Total OUT: 3 mL    Total NET: -3 mL      26 Mar 2018 07:01  -  27 Mar 2018 05:31  --------------------------------------------------------  IN:  Total IN: 0 mL    OUT:    Drain: 200 mL  Total OUT: 200 mL    Total NET: -200 mL          MEDICATIONS  (STANDING):  aspirin  chewable 81 milliGRAM(s) Oral daily  cholestyramine Powder (Sugar-Free) 4 Gram(s) Oral two times a day  ciprofloxacin     Tablet 500 milliGRAM(s) Oral <User Schedule>  docusate sodium 100 milliGRAM(s) Oral daily  folic acid 1 milliGRAM(s) Oral daily  heparin  Injectable 5000 Unit(s) SubCutaneous every 12 hours  hydrocortisone 2.5% Cream 1 Application(s) Topical two times a day  insulin glargine Injectable (LANTUS) 9 Unit(s) SubCutaneous every morning  multivitamin 1 Tablet(s) Oral daily  pantoprazole    Tablet 40 milliGRAM(s) Oral before breakfast  spironolactone 100 milliGRAM(s) Oral daily    MEDICATIONS  (PRN):  hydrOXYzine hydrochloride 25 milliGRAM(s) Oral four times a day PRN Itching      PHYSICAL EXAM:                          9.2    17.13 )-----------( 136      ( 25 Mar 2018 06:02 )             29.5        CBC Full  -  ( 25 Mar 2018 06:02 )  WBC Count : 17.13 K/uL  Hemoglobin : 9.2 g/dL  Hematocrit : 29.5 %  Platelet Count - Automated : 136 K/uL  Mean Cell Volume : 91.3 fL  Mean Cell Hemoglobin : 28.5 pg  Mean Cell Hemoglobin Concentration : 31.2 g/dL  Auto Neutrophil # : 7.44 K/uL  Auto Lymphocyte # : 8.58 K/uL  Auto Monocyte # : 0.67 K/uL  Auto Eosinophil # : 0.30 K/uL  Auto Basophil # : 0.07 K/uL  Auto Neutrophil % : 43.4 %  Auto Lymphocyte % : 50.1 %  Auto Monocyte % : 3.9 %  Auto Eosinophil % : 1.8 %  Auto Basophil % : 0.4 %               133   |  99    |  18                 Ca: 8.0    BMP:   ----------------------------< 156    Mg: x     (03-25-18 @ 06:02)             3.9    |  20    | 0.9                Ph: x        LFT:     TPro: 4.9 / Alb: 3.0 / TBili: 5.6 / DBili: x / AST: 172 / ALT: 218 / AlkPhos: >1200   (03-24-18 @ 09:06)      SPECTRA: [id number] TOYA SERNA  52y Female   [id number]    Hospital Day: 5  Post Operative Day:    Procedure/dx: leaking skin maceration s/p bedside stitches  Events of the Last 24h: ostomy appliance applied over abdominal wound    Patient is a 52y old  Female who presents with a chief complaint of Leakage of ascitic fluid from abdominal wound (24 Mar 2018 02:07)    PAST MEDICAL & SURGICAL HISTORY:  Psoriasis  HTN (hypertension)  CAD (coronary artery disease)  Diverticulitis  HTN (hypertension)  DM (diabetes mellitus)  Liver cirrhosis  Stented coronary artery  S/P CABG (coronary artery bypass graft)  CLL (chronic lymphocytic leukemia)  Hernia  S/P cystoscopy  H/O colostomy  S/P CABG (coronary artery bypass graft)  History of colon resection      Vital Signs Last 24 Hrs  T(C): 36.2 (26 Mar 2018 21:35), Max: 36.2 (26 Mar 2018 21:35)  T(F): 97.1 (26 Mar 2018 21:35), Max: 97.1 (26 Mar 2018 21:35)  HR: 86 (26 Mar 2018 21:35) (86 - 90)  BP: 111/59 (26 Mar 2018 21:35) (111/59 - 166/68)  BP(mean): --  RR: 18 (26 Mar 2018 21:35) (18 - 20)  SpO2: --        Diet, DASH/TLC:   Sodium & Cholesterol Restricted  Consistent Carbohydrate No Snacks (03-26-18 @ 05:53)      I&O's Summary    25 Mar 2018 07:01  -  26 Mar 2018 07:00  --------------------------------------------------------  IN: 0 mL / OUT: 3 mL / NET: -3 mL    26 Mar 2018 07:01  -  27 Mar 2018 05:31  --------------------------------------------------------  IN: 0 mL / OUT: 200 mL / NET: -200 mL     I&O's Detail    25 Mar 2018 07:01  -  26 Mar 2018 07:00  --------------------------------------------------------  IN:  Total IN: 0 mL    OUT:    Voided: 3 mL  Total OUT: 3 mL    Total NET: -3 mL      26 Mar 2018 07:01  -  27 Mar 2018 05:31  --------------------------------------------------------  IN:  Total IN: 0 mL    OUT:    Drain: 200 mL  Total OUT: 200 mL    Total NET: -200 mL          MEDICATIONS  (STANDING):  aspirin  chewable 81 milliGRAM(s) Oral daily  cholestyramine Powder (Sugar-Free) 4 Gram(s) Oral two times a day  ciprofloxacin     Tablet 500 milliGRAM(s) Oral <User Schedule>  docusate sodium 100 milliGRAM(s) Oral daily  folic acid 1 milliGRAM(s) Oral daily  heparin  Injectable 5000 Unit(s) SubCutaneous every 12 hours  hydrocortisone 2.5% Cream 1 Application(s) Topical two times a day  insulin glargine Injectable (LANTUS) 9 Unit(s) SubCutaneous every morning  multivitamin 1 Tablet(s) Oral daily  pantoprazole    Tablet 40 milliGRAM(s) Oral before breakfast  spironolactone 100 milliGRAM(s) Oral daily    MEDICATIONS  (PRN):  hydrOXYzine hydrochloride 25 milliGRAM(s) Oral four times a day PRN Itching      PHYSICAL EXAM:  GEN: NAD  HEENT: WNL  RESP: CTAB  CV: RRR  ABD: SOFT, NON TENDER, MILD DISTENSION, OSTOMY APPLIANCE IN PLACE                        9.2    17.13 )-----------( 136      ( 25 Mar 2018 06:02 )             29.5        CBC Full  -  ( 25 Mar 2018 06:02 )  WBC Count : 17.13 K/uL  Hemoglobin : 9.2 g/dL  Hematocrit : 29.5 %  Platelet Count - Automated : 136 K/uL  Mean Cell Volume : 91.3 fL  Mean Cell Hemoglobin : 28.5 pg  Mean Cell Hemoglobin Concentration : 31.2 g/dL  Auto Neutrophil # : 7.44 K/uL  Auto Lymphocyte # : 8.58 K/uL  Auto Monocyte # : 0.67 K/uL  Auto Eosinophil # : 0.30 K/uL  Auto Basophil # : 0.07 K/uL  Auto Neutrophil % : 43.4 %  Auto Lymphocyte % : 50.1 %  Auto Monocyte % : 3.9 %  Auto Eosinophil % : 1.8 %  Auto Basophil % : 0.4 %               133   |  99    |  18                 Ca: 8.0    BMP:   ----------------------------< 156    Mg: x     (03-25-18 @ 06:02)             3.9    |  20    | 0.9                Ph: x        LFT:     TPro: 4.9 / Alb: 3.0 / TBili: 5.6 / DBili: x / AST: 172 / ALT: 218 / AlkPhos: >1200   (03-24-18 @ 09:06)      SPECTRA: [id number]

## 2018-03-27 NOTE — PROGRESS NOTE ADULT - SUBJECTIVE AND OBJECTIVE BOX
TOYA SERNA  52y  Female  ***My note supercedes ALL resident notes that I sign.  My corrections for their notes are in my note.***    INTERVAL EVENTS: Had excellent d/w surgeon.  Pt seems more happy with current set up w/ colostomy appliance hooked up to drainage bag.  Pt is certainly more dry this way.  Sister will be taught how to change bags and care for drainage set up.  I see no reason to keep pt in the hospital longer.    T(F): 98 (03-27-18 @ 04:30), Max: 98 (03-27-18 @ 04:30)  HR: 92 (03-27-18 @ 04:30) (86 - 92)  BP: 114/65 (03-27-18 @ 04:30) (111/59 - 166/68)  RR: 18 (03-27-18 @ 04:30) (18 - 20)  SpO2: --    PHYSICAL EXAM:  GEN: No acute distress  LUNGS: Clear to auscultation bilaterally   HEART: S1/S2 present. RRR.   ABD: Soft, distended - but old finding (has hernia issue); there are 2 areas that are sutured: one is centrally/midline and the other is more lateral, toward right  lower quad; seems leak is coming more from central area.  A colostomy bag was placed around the sutured areas.  The RLQ suturing was not leaking. Binder is not more gently placed - good.  EXT: no edema  NEURO: AAOX3    LABS:    RADIOLOGY & ADDITIONAL TESTS:    ciprofloxacin     Tablet 500 milliGRAM(s) Oral <User Schedule>    aspirin  chewable 81 milliGRAM(s) Oral daily  cholestyramine Powder (Sugar-Free) 4 Gram(s) Oral every 8 hours  docusate sodium 100 milliGRAM(s) Oral daily  folic acid 1 milliGRAM(s) Oral daily  heparin  Injectable 5000 Unit(s) SubCutaneous every 12 hours  hydrocortisone 2.5% Cream 1 Application(s) Topical two times a day  hydrOXYzine hydrochloride 25 milliGRAM(s) Oral four times a day PRN  insulin glargine Injectable (LANTUS) 9 Unit(s) SubCutaneous every morning  multivitamin 1 Tablet(s) Oral daily  pantoprazole    Tablet 40 milliGRAM(s) Oral before breakfast  spironolactone 100 milliGRAM(s) Oral daily

## 2018-03-27 NOTE — PROGRESS NOTE ADULT - SUBJECTIVE AND OBJECTIVE BOX
SUBJECTIVE:    Patient is a 52y old Female who presents with a chief complaint of Leakage of ascitic fluid from abdominal wound (24 Mar 2018 02:07)    Currently admitted to medicine with the primary diagnosis of Hernia     Today is hospital day 4d. This morning she is resting comfortably in bed and reports no new issues or overnight events.     PAST MEDICAL & SURGICAL HISTORY  Psoriasis  HTN (hypertension)  CAD (coronary artery disease)  Diverticulitis  HTN (hypertension)  DM (diabetes mellitus)  Liver cirrhosis  Stented coronary artery  S/P CABG (coronary artery bypass graft)  CLL (chronic lymphocytic leukemia)  Hernia  S/P cystoscopy  H/O colostomy  S/P CABG (coronary artery bypass graft)  History of colon resection    SOCIAL HISTORY:  Negative for smoking/alcohol/drug use.     ALLERGIES:  No Known Allergies    MEDICATIONS:  STANDING MEDICATIONS  aspirin  chewable 81 milliGRAM(s) Oral daily  cholestyramine Powder (Sugar-Free) 4 Gram(s) Oral two times a day  ciprofloxacin     Tablet 500 milliGRAM(s) Oral <User Schedule>  docusate sodium 100 milliGRAM(s) Oral daily  folic acid 1 milliGRAM(s) Oral daily  heparin  Injectable 5000 Unit(s) SubCutaneous every 12 hours  hydrocortisone 2.5% Cream 1 Application(s) Topical two times a day  insulin glargine Injectable (LANTUS) 9 Unit(s) SubCutaneous every morning  multivitamin 1 Tablet(s) Oral daily  pantoprazole    Tablet 40 milliGRAM(s) Oral before breakfast  spironolactone 100 milliGRAM(s) Oral daily    PRN MEDICATIONS  hydrOXYzine hydrochloride 25 milliGRAM(s) Oral four times a day PRN    VITALS:   T(F): 98  HR: 92  BP: 114/65  RR: 18      RADIOLOGY:  US Abdomen (03/25): There is mild to moderate ascites seen within the abdomen and pelvis, greatest in the right lower quadrant.    PHYSICAL EXAM:  GEN: No acute distress  LUNGS: Clear to auscultation bilaterally   HEART: S1/S2 present. RRR.   ABD: Soft, non-tender, distended, bowel sounds present, abdominal binder, osteomy bag in place  EXT: NC/NC/NE/2+PP/SANCHEZ  NEURO: AAOX3

## 2018-03-27 NOTE — PROGRESS NOTE ADULT - ASSESSMENT
53 yo F with PMH of CAD, liver cirrhosis (idiopathic ?), CLL, ascites, s/p Hartmans and reversal for complicated diverticulitis and colovesicular fistula 3-4 years ago, has incisional hernia associated with previous parastomal site, large, nonobstructive, not surgical candidate, currently in hospice presented to the hospital for small leaking wound over incisional hernia site.    #)End Stage Liver Disease/liver cirrhosis/ ascitis  - Continue ciprofloxacin 500mg 3x/wk  - c/w aldactone (dose increased to 100mg PO q24); D/C furosemide for hypotension  - Blood culture: negative (final)  - Sx following: s/p suturing of abdominal wall dehiscence by surgery  - IR consulted for possible therapeutic drainage biweekly  - Osteomy bag in place to capture leak; do not overly bind abdomen  - Start midodrine if hypotensive  - avoid IVFs and NO IV PRESSOR AGENTS  - F/U Burn consult    #) Pruritis  - Face - hydrocort 2% cr q12 top to face for scaling seborrhea  - use moisturizer to body q12  - can use topical lidocaine to any painful areas on skin  - Continue cholestyramine 4gm q12 and increase as tolerated  - can cont hydroxyzine 25mg po q6h prn itch    #)Leukocytosis sec to CLL  - No fever, no need for infection work up.  - Follow up AM labs.    #) CAD / HTN / DM  - c/w aspirin  - monitor Blood pressure  - monitor FS keep <180 resume insulin if >180    #)Chronic hyponatremia sec to liver disease  - Resolved  - Monitor BMP and electrolytes    #)Anxiety  - Ativan 1mg oral daily    #)Diet  - Carb consistent    #)Activity  - Ambulate with assistance    #) Dvt ppx/GI PPX  - Hep Sc, Protonix    #) Dispo:  - Anticipated for tomorrow to Home hospice  - Follow up hospice consult    #)Code status:  - only DNR, NO DNI 51 yo F with PMH of CAD, liver cirrhosis (idiopathic ?), CLL, ascites, s/p Hartmans and reversal for complicated diverticulitis and colovesicular fistula 3-4 years ago, has incisional hernia associated with previous parastomal site, large, nonobstructive, not surgical candidate, currently in hospice presented to the hospital for small leaking wound over incisional hernia site.    #)End Stage Liver Disease/liver cirrhosis/ ascitis  - Continue ciprofloxacin 500mg 3x/wk  - c/w aldactone (dose increased to 100mg PO q24); D/C furosemide for hypotension  - Blood culture: negative (final)  - Sx following: s/p suturing of abdominal wall dehiscence by surgery  - IR consulted for possible therapeutic drainage biweekly  - Osteomy bag in place to capture leak; do not overly bind abdomen  - Start midodrine if hypotensive  - avoid IVFs and NO IV PRESSOR AGENTS  - Burn consult appreciated: No further workup    #) Pruritis  - Face - hydrocort 2% cr q12 top to face for scaling seborrhea  - use moisturizer to body q12  - can use topical lidocaine to any painful areas on skin  - Continue cholestyramine 4gm q12 and increase as tolerated  - can cont hydroxyzine 25mg po q6h prn itch    #)Leukocytosis sec to CLL  - No fever, no need for infection work up.  - Follow up AM labs.    #) CAD / HTN / DM  - c/w aspirin  - monitor Blood pressure  - monitor FS keep <180 resume insulin if >180    #)Chronic hyponatremia sec to liver disease  - Resolved  - Monitor BMP and electrolytes    #)Anxiety  - Ativan 1mg oral daily    #)Diet  - Carb consistent    #)Activity  - Ambulate with assistance    #) Dvt ppx/GI PPX  - Hep Sc, Protonix    #) Dispo:  - Anticipated for tomorrow to Home hospice  - Follow up hospice consult    #)Code status:  - only DNR, NO DNI 53 yo F with PMH of CAD, liver cirrhosis (idiopathic ?), CLL, ascites, s/p Hartmans and reversal for complicated diverticulitis and colovesicular fistula 3-4 years ago, has incisional hernia associated with previous parastomal site, large, nonobstructive, not surgical candidate, currently in hospice presented to the hospital for small leaking wound over incisional hernia site.    #)End Stage Liver Disease/liver cirrhosis/ ascitis  - Continue ciprofloxacin 500mg 3x/wk  - c/w aldactone (dose increased to 100mg PO q24); D/C furosemide for hypotension  - Blood culture: negative (final)  - Sx following: s/p suturing of abdominal wall dehiscence by surgery  - IR consulted (0075) for therapeutic drainage biweekly: will decide if pt can go for paracentesis today  - Osteomy bag in place to capture leak; do not overly bind abdomen  - Start midodrine if hypotensive  - avoid IVFs and NO IV PRESSOR AGENTS  - Burn consult appreciated: No further workup    #) Pruritis  - Face - hydrocort 2% cr q12 top to face for scaling seborrhea  - use moisturizer to body q12  - can use topical lidocaine to any painful areas on skin  - Continue cholestyramine 4gm q12 and increase as tolerated  - can cont hydroxyzine 25mg po q6h prn itch    #)Leukocytosis sec to CLL  - No fever, no need for infection work up.  - Follow up AM labs.    #) CAD / HTN / DM  - c/w aspirin  - monitor Blood pressure  - monitor FS keep <180 resume insulin if >180    #)Chronic hyponatremia sec to liver disease  - Resolved  - Monitor BMP and electrolytes    #)Anxiety  - Ativan 1mg oral daily    #)Diet  - Carb consistent    #)Activity  - Ambulate with assistance    #) Dvt ppx/GI PPX  - Hep Sc, Protonix    #) Dispo:  - Anticipated for tomorrow to Home hospice  - Follow up hospice consult    #)Code status:  - only DNR, NO DNI

## 2018-03-28 ENCOUNTER — TRANSCRIPTION ENCOUNTER (OUTPATIENT)
Age: 53
End: 2018-03-28

## 2018-03-28 VITALS
SYSTOLIC BLOOD PRESSURE: 110 MMHG | HEART RATE: 84 BPM | DIASTOLIC BLOOD PRESSURE: 61 MMHG | TEMPERATURE: 98 F | RESPIRATION RATE: 18 BRPM

## 2018-03-28 RX ORDER — ASPIRIN/CALCIUM CARB/MAGNESIUM 324 MG
1 TABLET ORAL
Qty: 0 | Refills: 0 | COMMUNITY
Start: 2018-03-28

## 2018-03-28 RX ORDER — POTASSIUM CHLORIDE 20 MEQ
0 PACKET (EA) ORAL
Qty: 0 | Refills: 0 | COMMUNITY

## 2018-03-28 RX ORDER — HYDROXYZINE HCL 10 MG
1 TABLET ORAL
Qty: 0 | Refills: 0 | COMMUNITY

## 2018-03-28 RX ORDER — CHOLESTYRAMINE 4 G/9G
4 POWDER, FOR SUSPENSION ORAL
Qty: 1 | Refills: 0 | OUTPATIENT
Start: 2018-03-28 | End: 2018-04-26

## 2018-03-28 RX ORDER — PANTOPRAZOLE SODIUM 20 MG/1
1 TABLET, DELAYED RELEASE ORAL
Qty: 0 | Refills: 0 | COMMUNITY
Start: 2018-03-28

## 2018-03-28 RX ORDER — FOLIC ACID 0.8 MG
1 TABLET ORAL
Qty: 0 | Refills: 0 | COMMUNITY
Start: 2018-03-28

## 2018-03-28 RX ORDER — HYDROCORTISONE 1 %
1 OINTMENT (GRAM) TOPICAL
Qty: 1 | Refills: 0 | OUTPATIENT
Start: 2018-03-28 | End: 2018-04-10

## 2018-03-28 RX ORDER — FOLIC ACID 0.8 MG
1 TABLET ORAL
Qty: 0 | Refills: 0 | COMMUNITY

## 2018-03-28 RX ORDER — SPIRONOLACTONE 25 MG/1
1 TABLET, FILM COATED ORAL
Qty: 30 | Refills: 1 | OUTPATIENT
Start: 2018-03-28 | End: 2018-05-26

## 2018-03-28 RX ORDER — ASPIRIN/CALCIUM CARB/MAGNESIUM 324 MG
1 TABLET ORAL
Qty: 0 | Refills: 0 | COMMUNITY

## 2018-03-28 RX ORDER — DOCUSATE SODIUM 100 MG
1 CAPSULE ORAL
Qty: 0 | Refills: 0 | COMMUNITY
Start: 2018-03-28

## 2018-03-28 RX ORDER — DOCUSATE SODIUM 100 MG
0 CAPSULE ORAL
Qty: 0 | Refills: 0 | COMMUNITY

## 2018-03-28 RX ORDER — FUROSEMIDE 40 MG
1 TABLET ORAL
Qty: 0 | Refills: 0 | COMMUNITY

## 2018-03-28 RX ORDER — SPIRONOLACTONE 25 MG/1
50 TABLET, FILM COATED ORAL
Qty: 0 | Refills: 0 | COMMUNITY

## 2018-03-28 RX ORDER — HYDROXYZINE HCL 10 MG
1 TABLET ORAL
Qty: 0 | Refills: 0 | COMMUNITY
Start: 2018-03-28

## 2018-03-28 RX ORDER — CIPROFLOXACIN LACTATE 400MG/40ML
1 VIAL (ML) INTRAVENOUS
Qty: 26 | Refills: 0 | OUTPATIENT
Start: 2018-03-28 | End: 2018-05-26

## 2018-03-28 RX ORDER — PANTOPRAZOLE SODIUM 20 MG/1
0 TABLET, DELAYED RELEASE ORAL
Qty: 0 | Refills: 0 | COMMUNITY

## 2018-03-28 RX ADMIN — Medication 81 MILLIGRAM(S): at 11:15

## 2018-03-28 RX ADMIN — Medication 100 MILLIGRAM(S): at 11:15

## 2018-03-28 RX ADMIN — INSULIN GLARGINE 9 UNIT(S): 100 INJECTION, SOLUTION SUBCUTANEOUS at 08:18

## 2018-03-28 RX ADMIN — Medication 1 APPLICATION(S): at 04:41

## 2018-03-28 RX ADMIN — SPIRONOLACTONE 100 MILLIGRAM(S): 25 TABLET, FILM COATED ORAL at 04:41

## 2018-03-28 RX ADMIN — Medication 1 MILLIGRAM(S): at 11:15

## 2018-03-28 RX ADMIN — PANTOPRAZOLE SODIUM 40 MILLIGRAM(S): 20 TABLET, DELAYED RELEASE ORAL at 07:00

## 2018-03-28 RX ADMIN — Medication 1 TABLET(S): at 11:15

## 2018-03-28 NOTE — PROGRESS NOTE ADULT - ASSESSMENT
Assessment:  52y Female patient admitted with large ventral hernia containing bowel with leaking ascites through macerated skin; ostomy appliance in place over abdomen draining large amount of serous ascites fluid, with the above physical exam, labs, and imaging findings.    Plan:  As per primary team  Discharge pending SW, Home hospice, palliative  C/W ostomy care as per medicine team  Strict I+O  IVF for replacement as needed  encourage PO intake, protein supplements    Date/Time: 03-28-18 @ 02:35

## 2018-03-28 NOTE — DISCHARGE NOTE ADULT - PATIENT PORTAL LINK FT
You can access the [facility] Patient Portal, offered by [facility], by registering with the following website: [email or web address]

## 2018-03-28 NOTE — DISCHARGE NOTE ADULT - MEDICATION SUMMARY - MEDICATIONS TO STOP TAKING
I will STOP taking the medications listed below when I get home from the hospital:    potassium chloride    pantoprazole    HumaLOG    Levemir 100 units/mL subcutaneous solution    aspirin 81 mg oral tablet, chewable  -- 1 tab(s) by mouth once a day    docusate    furosemide 40 mg oral tablet  -- 1 tab(s) by mouth once a day    folic acid  -- 1 milligram(s) by mouth once a day    spironolactone  -- 50 milligram(s) by mouth once a day    Multiple Vitamins oral tablet  -- 1 tab(s) by mouth once a day    Cipro 500 mg oral tablet  -- 1 tab(s) by mouth 2 times a day   -- Avoid prolonged or excessive exposure to direct and/or artificial sunlight while taking this medication.  Check with your doctor before becoming pregnant.  Do not take dairy products, antacids, or iron preparations within one hour of this medication.  Finish all this medication unless otherwise directed by prescriber.  Medication should be taken with plenty of water.    hydrOXYzine hydrochloride 25 mg oral tablet  -- 1 tab(s) by mouth 3 times a day

## 2018-03-28 NOTE — DISCHARGE NOTE ADULT - CARE PROVIDER_API CALL
Yaima Bergman (CARLOTA), Surgery  [address] [place] [address]  [address] [place] [address]  Phone: [phone]  Fax: [phone]    Candie Xiong), Interventional Radiology  [address] [place] [address]  Phone: [phone]  Fax: [phone]

## 2018-03-28 NOTE — PROGRESS NOTE ADULT - SUBJECTIVE AND OBJECTIVE BOX
SUBJECTIVE:    Patient is a 52y old Female who presents with a chief complaint of Leakage of ascitic fluid from abdominal wound (24 Mar 2018 02:07)    Currently admitted to medicine with the primary diagnosis of Hernia     Today is hospital day 5d. This morning she is resting comfortably in bed and reports no new issues or overnight events.     PAST MEDICAL & SURGICAL HISTORY  Psoriasis  HTN (hypertension)  CAD (coronary artery disease)  Diverticulitis  HTN (hypertension)  DM (diabetes mellitus)  Liver cirrhosis  Stented coronary artery  S/P CABG (coronary artery bypass graft)  CLL (chronic lymphocytic leukemia)  Hernia  S/P cystoscopy  H/O colostomy  S/P CABG (coronary artery bypass graft)  History of colon resection    SOCIAL HISTORY:  Negative for smoking/alcohol/drug use.     ALLERGIES:  No Known Allergies    MEDICATIONS:  STANDING MEDICATIONS  aspirin  chewable 81 milliGRAM(s) Oral daily  cholestyramine Powder (Sugar-Free) 4 Gram(s) Oral every 8 hours  ciprofloxacin     Tablet 500 milliGRAM(s) Oral <User Schedule>  docusate sodium 100 milliGRAM(s) Oral daily  folic acid 1 milliGRAM(s) Oral daily  heparin  Injectable 5000 Unit(s) SubCutaneous every 12 hours  hydrocortisone 2.5% Cream 1 Application(s) Topical two times a day  insulin glargine Injectable (LANTUS) 9 Unit(s) SubCutaneous every morning  multivitamin 1 Tablet(s) Oral daily  pantoprazole    Tablet 40 milliGRAM(s) Oral before breakfast  spironolactone 100 milliGRAM(s) Oral daily    PRN MEDICATIONS  hydrOXYzine hydrochloride 25 milliGRAM(s) Oral four times a day PRN    VITALS:   T(F): 98.6  HR: 96  BP: 107/74  RR: 18    RADIOLOGY:  US Abdomen (03/25): There is mild to moderate ascites seen within the abdomen and pelvis, greatest in the right lower quadrant.    PHYSICAL EXAM:  GEN: No acute distress  LUNGS: Clear to auscultation bilaterally   HEART: S1/S2 present. RRR.   ABD: Soft, non-tender, mildly distended, bowel sounds present, abdominal binder, osteomy bag in place  EXT: NC/NC/NE/2+PP/SANCHEZ  NEURO: AAOX3

## 2018-03-28 NOTE — DISCHARGE NOTE ADULT - MEDICATION SUMMARY - MEDICATIONS TO CHANGE
I will SWITCH the dose or number of times a day I take the medications listed below when I get home from the hospital:    LORazepam 1 mg oral tablet

## 2018-03-28 NOTE — PROGRESS NOTE ADULT - ATTENDING COMMENTS
Pt with cryptogenic cirrhosis and liver failure.   Pt with Chronic Leukemia and unable to get liver transplant.  Pt has massive hernia which has been deemed un operable by many surgeons.   Pt has been on Hospice.    She presents with leaking ascites : Anterior Abdominal wall sutured  It started leaking another area of Anterior Abdominal wall sutured
Pt with cryptogenic cirrhosis and liver failure.   Pt with Chronic Leukemia and unable to get liver transplant.  Pt has massive hernia which has been deemed un operable by many surgeons.   Pt has been on Hospice.    She presents with leaking ascites : Anterior Abdominal wall sutured  She has leaked and needed two more Anterior Abdominal wall sutures.  She Leaked again.   Offered to suture again and tight abdominal binder.  Patient and primary team prefer to use a draining bag.
Pt with cryptogenic cirrhosis and liver failure.   Pt with Chronic Leukemia and unable to get liver transplant.  Pt has massive hernia which has been deemed un operable by many surgeons.   Pt has been on Hospice.    She presents with leaking ascites : Anterior Abdominal wall sutured  She has leaked and needed two more Anterior Abdominal wall sutures.  She Leaked again.   Offered to suture again and tight abdominal binder.  Patient and primary team prefer to use a draining bag.  Hernia site draining large volume ascitics.   Consider colloidal transfusion and Increase diuretics.  Repeat labs to evaluate renal function and fluid status.
Pt with cryptogenic cirrhosis and liver failure.   Pt with Chronic Leukemia and unable to get liver transplant.  Pt has massive hernia which has been deemed un operable by many surgeons.   Pt has been on Hospice.    She presents with leaking ascites : Anterior Abdominal wall sutured  She has leaked and needed two more Anterior Abdominal wall sutures.  She Leaked again.   Offered to suture again and tight abdominal binder.  Patient and primary team prefer to use a draining bag.  Hernia site draining large volume ascitics.   Consider colloidal transfusion and Increase diuretics.   Repeat labs to evaluate renal function and fluid status.
Pt with cryptogenic cirrhosis and liver failure.   Pt with Chronic Leukemia and unable to get liver transplant.  Pt has massive hernia which has been deemed un operable by many surgeons.   Pt has been on Hospice.    She presents with leaking ascites : Anterior Abdominal wall sutured  She leaked twice yesterday and needed two mor Anterior Abdominal wall sutures

## 2018-03-28 NOTE — PROGRESS NOTE ADULT - NSHPATTENDINGPLANDISCUSS_GEN_ALL_CORE
patient , ED and surgical staff
patient and surgical staff
patient, medical  and surgical staff
patient, medical  and surgical staff
patient and surgical staff

## 2018-03-28 NOTE — DISCHARGE NOTE ADULT - HOSPITAL COURSE
51 yo F with PMH of CAD, liver cirrhosis with ascites, CLL, s/p Hartmans and reversal for complicated diverticulitis and colovesicular fistula 3-4 years ago, has incisional hernia associated with previous parastomal site, large, nonobstructive, not surgical candidate, currently in hospice presented to the hospital for small leaking wound over incisional hernia site. Pt seen by Dr. Bergman in his office where he repaired the wound and did stitching and sent to the ED. She was admitted to the medical floor for further management of her ascites. As an alternative intervention, an osteomy bag was placed over the leak and connected to bedside catheter bag. This is a tempory setup and was discussed with the patient and family. She was continue on ciprofloxacin for SBP prophylaxis. She also had pruritis and was treated with cholestyramine. She was advised to follow up with surgery and possibly IR for scheduling paracentesis if needed. On discharge, patient had stable vitals and the ostomy was draining well. She has no complains and feels better.

## 2018-03-28 NOTE — PROGRESS NOTE ADULT - SUBJECTIVE AND OBJECTIVE BOX
Progress Note: General Surgery  Patient: TOYA SERNA , 52y (1965)Female   MRN: [id number]  Location: [place] [address] A  Visit: 03-23-18 Inpatient  Date: 03-28-18 @ 02:35  Hospital Day: 6    Procedure/Diagnosis: large ventral hernia containing bowel with leaking ascites through macerated skin  Events over 24h: ostomy appliance in place over abdomen draining large amount of serous ascites fluid    Vitals:   T(F): 98.6 (03-27-18 @ 21:19), Max: 98.6 (03-27-18 @ 21:19)  HR: 96 (03-27-18 @ 21:19)  BP: 107/74 (03-27-18 @ 21:19) (107/74 - 131/61)  RR: 18 (03-27-18 @ 21:19)    In:   03-27-18 @ 07:01  -  03-28-18 @ 02:35  --------------------------------------------------------  IN: 0 mL    Out:   03-27-18 @ 07:01  -  03-28-18 @ 02:35  --------------------------------------------------------  OUT:    Drain: 900 mL  Total OUT: 900 mL     ARIA:   03-27-18 @ 07:01  -  03-28-18 @ 02:35  --------------------------------------------------------  OUT: 900 mL    Net:   03-27-18 @ 07:01  -  03-28-18 @ 02:35  --------------------------------------------------------  NET: -900 mL    Diet: Diet, DASH/TLC:   Sodium & Cholesterol Restricted  Consistent Carbohydrate No Snacks (03-26-18 @ 05:53)  IV Fluids: no, Type: IVL    Physical Examination:  General Appearance: NAD, alert and cooperative, small, frail habitus  HEENT: NCAT, WNL  Heart: S1 and S2. No murmurs. Rhythm is regular   Lungs: Clear to auscultation BL without rales, rhonchi, wheezing, crackles or diminished breath sounds.  Abdomen:  Positive bowel sounds. Soft, distended, nontender. Thin, macerated skin overlying bowel-containing ventral hernia with visual peristalsis through skin, serous fluid leaking through small defect in skin, nylon sutures in place, ostomy appliance in place over abdomen draining large amount of serous ascites fluid  Skin: Warm/dry, Normal color, No jaundice.   Incisions/Wounds: Dressings in place, wet with serous drainage from abdominal ascites, no signs of infection    Medications:  aspirin  chewable 81 milliGRAM(s) Oral daily  cholestyramine Powder (Sugar-Free) 4 Gram(s) Oral every 8 hours  docusate sodium 100 milliGRAM(s) Oral daily  folic acid 1 milliGRAM(s) Oral daily  hydrocortisone 2.5% Cream 1 Application(s) Topical two times a day  hydrOXYzine hydrochloride 25 milliGRAM(s) Oral four times a day PRN Itching  insulin glargine Injectable (LANTUS) 9 Unit(s) SubCutaneous every morning  multivitamin 1 Tablet(s) Oral daily  spironolactone 100 milliGRAM(s) Oral daily    DVT Prophylaxis: heparin  Injectable 5000 Unit(s) SubCutaneous every 12 hours  GI Prophylaxis: pantoprazole    Tablet 40 milliGRAM(s) Oral before breakfast  Antibiotics: ciprofloxacin     Tablet 500 milliGRAM(s) Oral <User Schedule>    Labs:  No labs    Imaging:  No imaging/24h

## 2018-03-28 NOTE — DISCHARGE NOTE ADULT - MEDICATION SUMMARY - MEDICATIONS TO TAKE
I will START or STAY ON the medications listed below when I get home from the hospital:    spironolactone 100 mg oral tablet  -- 1 tab(s) by mouth once a day   -- It is very important that you take or use this exactly as directed.  Do not skip doses or discontinue unless directed by your doctor.  May cause drowsiness or dizziness.    -- Indication: For Alcoholic cirrhosis of liver with ascites    aspirin 81 mg oral tablet, chewable  -- 1 tab(s) by mouth once a day  -- Indication: For Alcoholic cirrhosis of liver with ascites    LORazepam 1 mg oral tablet  -- Indication: For Alcoholic cirrhosis of liver with ascites    HumaLOG  -- Indication: For Alcoholic cirrhosis of liver with ascites    Levemir 100 units/mL subcutaneous solution  -- Indication: For Alcoholic cirrhosis of liver with ascites    cholestyramine 4 g/9 g oral powder for reconstitution  -- 4 gram(s) by mouth 3 times a day   -- Indication: For Pruritic condition    hydrOXYzine hydrochloride 25 mg oral tablet  -- 1 tab(s) by mouth 4 times a day, As needed, Itching  -- Indication: For Pruritic condition    hydrocortisone 2.5% topical cream  -- 1 application on skin 2 times a day  -- Indication: For Pruritic condition    docusate sodium 100 mg oral capsule  -- 1 cap(s) by mouth once a day  -- Indication: For Alcoholic cirrhosis of liver with ascites    pantoprazole 40 mg oral delayed release tablet  -- 1 tab(s) by mouth once a day (before a meal)  -- Indication: For Alcoholic cirrhosis of liver with ascites    ciprofloxacin 500 mg oral tablet  -- 1 tab(s) by mouth 3 times a week   -- Indication: For Alcoholic cirrhosis of liver with ascites    Multiple Vitamins oral tablet  -- 1 tab(s) by mouth once a day  -- Indication: For Alcoholic cirrhosis of liver with ascites    folic acid 1 mg oral tablet  -- 1 tab(s) by mouth once a day  -- Indication: For Alcoholic cirrhosis of liver with ascites

## 2018-03-28 NOTE — PROGRESS NOTE ADULT - PROVIDER SPECIALTY LIST ADULT
Internal Medicine
Intervent Radiology
Intervent Radiology
Surgery
Internal Medicine
Internal Medicine

## 2018-03-28 NOTE — DISCHARGE NOTE ADULT - PLAN OF CARE
Medical management - Continue ciprofloxacin 500mg 3x/wk for SBP ppx  - c/w aldactone; D/C furosemide for hypotension  - Blood culture: negative (final)  - Sx following: s/p suturing of abdominal wall dehiscence by surgery  - IR consulted ([id number]) for therapeutic drainage biweekly: not needed now, will refer pt to IR on d/c  - Osteomy bag in place to capture leak; do not overly bind abdomen - Face - hydrocort 2% cr q12 top to face for scaling seborrhea  - use moisturizer to body q12  - can use topical lidocaine to any painful areas on skin  - Continue cholestyramine 4gm q8   - can cont hydroxyzine 25mg po q6h prn itch

## 2018-03-28 NOTE — DISCHARGE NOTE ADULT - CARE PLAN
Principal Discharge DX:	Alcoholic cirrhosis of liver with ascites  Goal:	Medical management  Assessment and plan of treatment:	- Continue ciprofloxacin 500mg 3x/wk for SBP ppx  - c/w aldactone; D/C furosemide for hypotension  - Blood culture: negative (final)  - Sx following: s/p suturing of abdominal wall dehiscence by surgery  - IR consulted ([id number]) for therapeutic drainage biweekly: not needed now, will refer pt to IR on d/c  - Osteomy bag in place to capture leak; do not overly bind abdomen  Secondary Diagnosis:	Pruritic condition  Goal:	Medical management  Assessment and plan of treatment:	- Face - hydrocort 2% cr q12 top to face for scaling seborrhea  - use moisturizer to body q12  - can use topical lidocaine to any painful areas on skin  - Continue cholestyramine 4gm q8   - can cont hydroxyzine 25mg po q6h prn itch

## 2018-03-28 NOTE — PROGRESS NOTE ADULT - ASSESSMENT
51 yo F with PMH of CAD, liver cirrhosis (idiopathic ?), CLL, ascites, s/p Hartmans and reversal for complicated diverticulitis and colovesicular fistula 3-4 years ago, has incisional hernia associated with previous parastomal site, large, nonobstructive, not surgical candidate, currently in hospice presented to the hospital for small leaking wound over incisional hernia site.    #)End Stage Liver Disease/liver cirrhosis/ ascitis  - Continue ciprofloxacin 500mg 3x/wk for SBP ppx  - c/w aldactone; D/C furosemide for hypotension  - Blood culture: negative (final)  - Sx following: s/p suturing of abdominal wall dehiscence by surgery  - IR consulted ([id number]) for therapeutic drainage biweekly: not needed now, will refer pt to IR on d/c  - Osteomy bag in place to capture leak; do not overly bind abdomen  - Start midodrine if hypotensive  - avoid IVFs and NO IV PRESSOR AGENTS  - Burn consult appreciated: Continue current mx    #) Pruritis  - Face - hydrocort 2% cr q12 top to face for scaling seborrhea  - use moisturizer to body q12  - can use topical lidocaine to any painful areas on skin  - Continue cholestyramine 4gm q8   - can cont hydroxyzine 25mg po q6h prn itch    #)Leukocytosis sec to CLL  - No fever, no need for infection work up.    #) CAD / HTN / DM  - c/w aspirin  - monitor Blood pressure  - monitor FS keep <180 resume insulin if >180    #)Chronic hyponatremia sec to liver disease  - Resolved    #)Anxiety  - Ativan 1mg oral daily    #)Diet  - Carb consistent    #)Activity  - Ambulate with assistance    #) Dvt ppx/GI PPX  - Hep Sc, Protonix    #) Disposition  - D/C today to Home hospice  - Follow up hospice consult    #) Code status:  - only DNR, NO DNI

## 2018-03-29 DIAGNOSIS — I10 ESSENTIAL (PRIMARY) HYPERTENSION: ICD-10-CM

## 2018-03-29 DIAGNOSIS — R18.8 OTHER ASCITES: ICD-10-CM

## 2018-03-29 DIAGNOSIS — T81.30XA DISRUPTION OF WOUND, UNSPECIFIED, INITIAL ENCOUNTER: ICD-10-CM

## 2018-03-29 DIAGNOSIS — Z79.4 LONG TERM (CURRENT) USE OF INSULIN: ICD-10-CM

## 2018-03-29 DIAGNOSIS — Z95.1 PRESENCE OF AORTOCORONARY BYPASS GRAFT: ICD-10-CM

## 2018-03-29 DIAGNOSIS — L29.9 PRURITUS, UNSPECIFIED: ICD-10-CM

## 2018-03-29 DIAGNOSIS — Z66 DO NOT RESUSCITATE: ICD-10-CM

## 2018-03-29 DIAGNOSIS — L21.9 SEBORRHEIC DERMATITIS, UNSPECIFIED: ICD-10-CM

## 2018-03-29 DIAGNOSIS — E87.1 HYPO-OSMOLALITY AND HYPONATREMIA: ICD-10-CM

## 2018-03-29 DIAGNOSIS — K43.2 INCISIONAL HERNIA WITHOUT OBSTRUCTION OR GANGRENE: ICD-10-CM

## 2018-03-29 DIAGNOSIS — L98.8 OTHER SPECIFIED DISORDERS OF THE SKIN AND SUBCUTANEOUS TISSUE: ICD-10-CM
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
## 2018-03-29 DIAGNOSIS — I25.10 ATHEROSCLEROTIC HEART DISEASE OF NATIVE CORONARY ARTERY WITHOUT ANGINA PECTORIS: ICD-10-CM

## 2018-03-29 DIAGNOSIS — Z79.82 LONG TERM (CURRENT) USE OF ASPIRIN: ICD-10-CM

## 2018-03-29 DIAGNOSIS — E11.9 TYPE 2 DIABETES MELLITUS WITHOUT COMPLICATIONS: ICD-10-CM

## 2018-03-29 DIAGNOSIS — C91.10 CHRONIC LYMPHOCYTIC LEUKEMIA OF B-CELL TYPE NOT HAVING ACHIEVED REMISSION: ICD-10-CM

## 2018-03-29 DIAGNOSIS — Z90.49 ACQUIRED ABSENCE OF OTHER SPECIFIED PARTS OF DIGESTIVE TRACT: ICD-10-CM

## 2018-03-29 DIAGNOSIS — K72.90 HEPATIC FAILURE, UNSPECIFIED WITHOUT COMA: ICD-10-CM

## 2018-03-29 DIAGNOSIS — F41.9 ANXIETY DISORDER, UNSPECIFIED: ICD-10-CM

## 2018-03-29 DIAGNOSIS — K74.69 OTHER CIRRHOSIS OF LIVER: ICD-10-CM

## 2018-03-29 DIAGNOSIS — Z95.5 PRESENCE OF CORONARY ANGIOPLASTY IMPLANT AND GRAFT: ICD-10-CM

## 2018-03-29 DIAGNOSIS — R64 CACHEXIA: ICD-10-CM

## 2018-04-12 DIAGNOSIS — R18.8 OTHER ASCITES: ICD-10-CM

## 2018-04-18 ENCOUNTER — APPOINTMENT (OUTPATIENT)
Dept: SURGERY | Facility: CLINIC | Age: 53
End: 2018-04-18
Payer: MEDICARE

## 2018-04-18 PROCEDURE — 99215 OFFICE O/P EST HI 40 MIN: CPT

## 2018-04-23 ENCOUNTER — OUTPATIENT (OUTPATIENT)
Dept: OUTPATIENT SERVICES | Facility: HOSPITAL | Age: 53
LOS: 1 days | Discharge: HOME | End: 2018-04-23

## 2018-04-23 DIAGNOSIS — Z95.1 PRESENCE OF AORTOCORONARY BYPASS GRAFT: Chronic | ICD-10-CM

## 2018-04-23 DIAGNOSIS — R18.8 OTHER ASCITES: ICD-10-CM

## 2018-04-23 DIAGNOSIS — Z98.890 OTHER SPECIFIED POSTPROCEDURAL STATES: Chronic | ICD-10-CM

## 2018-04-23 NOTE — PROGRESS NOTE ADULT - SUBJECTIVE AND OBJECTIVE BOX
Interventional Radiology Outpatient Documentation    PREOPERATIVE DAY OF PROCEDURE EVALUATION:     I have personally seen and examined this patient. I agree with the history and physical which I have reviewed and noted any changes below:     Plan is for 04-23-18 @ 15:00    Procedure/ risks/ benefits/ goals/ alternatives were explained. All questions answered. Informed content obtained from patient. Consent placed in chart.     POSTOPERATIVE PROCEDURAL EVALUATION:     Procedure: right sided image guided paracentesis - therapeutic     Pre-Op Diagnosis: end stage liver disease/cirrhosis     Post-Op Diagnosis: end stage liver disease/ cirrhosis     Attending: Arron Nieto MD  Physician Assistant: Candie Xiong PA-C     Anesthesia (type):  [ ] General Anesthesia  [ ] Sedation  [ ] Spinal Anesthesia  [ x ] Local/Regional    Contrast: none    Estimated Blood Loss: <5mL    Condition:   [ ] Critical  [ ] Serious  [ ] Fair   [ x ] Good    Findings/Follow up Plan of Care: __________L of serosanguinous fluid removed     Specimens Removed: none requested by requesting provider     Implants: none    Complications: none    Disposition: well tolerated, no complications, resume diet and activity as tolerated, resume medications as needed, follow up with primary physician as scheduled, to D/C home pending stable vitals Interventional Radiology Outpatient Documentation    PREOPERATIVE DAY OF PROCEDURE EVALUATION:     I have personally seen and examined this patient. I agree with the history and physical which I have reviewed and noted any changes below:     Plan is for 04-23-18 @ 15:00    Procedure/ risks/ benefits/ goals/ alternatives were explained. All questions answered. Informed content obtained from patient. Consent placed in chart.     POSTOPERATIVE PROCEDURAL EVALUATION:     Procedure: right sided image guided paracentesis - therapeutic     Pre-Op Diagnosis: end stage liver disease/cirrhosis     Post-Op Diagnosis: end stage liver disease/ cirrhosis     Attending: Arron Nieto MD  Physician Assistant: Candie Xiong PA-C     Anesthesia (type):  [ ] General Anesthesia  [ ] Sedation  [ ] Spinal Anesthesia  [ x ] Local/Regional    Contrast: none    Estimated Blood Loss: <5mL    Condition:   [ ] Critical  [ ] Serious  [ ] Fair   [ x ] Good    Findings/Follow up Plan of Care: 2.5L of serosanguinous fluid removed     Specimens Removed: none requested by requesting provider     Implants: none    Complications: none    Disposition: well tolerated, no complications, resume diet and activity as tolerated, resume medications as needed, follow up with primary physician as scheduled, to D/C home pending stable vitals

## 2018-04-25 DIAGNOSIS — Z02.9 ENCOUNTER FOR ADMINISTRATIVE EXAMINATIONS, UNSPECIFIED: ICD-10-CM

## 2018-04-27 ENCOUNTER — INPATIENT (INPATIENT)
Facility: HOSPITAL | Age: 53
LOS: 6 days | Discharge: HOSPICE | End: 2018-05-04
Attending: HOSPITALIST | Admitting: HOSPITALIST
Payer: MEDICARE

## 2018-04-27 VITALS
HEIGHT: 55 IN | SYSTOLIC BLOOD PRESSURE: 78 MMHG | HEART RATE: 117 BPM | DIASTOLIC BLOOD PRESSURE: 50 MMHG | WEIGHT: 76.94 LBS | RESPIRATION RATE: 18 BRPM | OXYGEN SATURATION: 100 % | TEMPERATURE: 99 F

## 2018-04-27 DIAGNOSIS — Z98.890 OTHER SPECIFIED POSTPROCEDURAL STATES: Chronic | ICD-10-CM
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
## 2018-04-27 DIAGNOSIS — Z95.1 PRESENCE OF AORTOCORONARY BYPASS GRAFT: Chronic | ICD-10-CM

## 2018-04-27 PROCEDURE — 99285 EMERGENCY DEPT VISIT HI MDM: CPT | Mod: 25,GV

## 2018-04-27 PROCEDURE — 12031 INTMD RPR S/A/T/EXT 2.5 CM/<: CPT | Mod: GV

## 2018-04-27 NOTE — ED PROVIDER NOTE - PHYSICAL EXAMINATION
VITAL SIGNS: I have reviewed nursing notes and confirm.  CONSTITUTIONAL: chronicaly ill appearing, cachetic.  SKIN: Swarm and dry, jaundiced.  HEAD: Normocephalic; atraumatic.  EYES: PERRL, EOM intact; icteric.  ENT: No nasal discharge; airway clear.   NECK: Supple; non tender.  CARD:+ S1, S2 tachy.  RESP: No wheezes, rales or rhonchi.  ABD: Normal bowel sounds; distended, nt, bleeding from puncture sites.  EXT: Normal ROM. N    NEURO: Alert. Grossly unremarkable. No focal deficits.  PSYCH: Cooperative, appropriate.

## 2018-04-27 NOTE — ED PROVIDER NOTE - CARE PLAN
Principal Discharge DX:	Liver cirrhosis Principal Discharge DX:	DKA (diabetic ketoacidoses)  Secondary Diagnosis:	Sepsis  Secondary Diagnosis:	Anemia

## 2018-04-27 NOTE — ED PROVIDER NOTE - PROGRESS NOTE DETAILS
Dr Lorenz with pt. attempted hemostasis. would like pt transferred north for furthe surgical mgmt. Dr Lorenz with pt. attempted hemostasis. would like pt transferred north for furthe surgical mgmt.  pt is hospice care and DNR/DNI. family would like pt tranfused if needed at this point, but they want her to remain hospice care at this point (sts she has been admitted to the hospital and placed back in hospice prior). Family has retracted hospice. Requesting medical resus, but requesting her to remain DNR DNI. Pt ran by unit given sepsis, DKA, and anemia while having CLL and cirrhosis. Family agrees with central line. Pt remains DNR DNI. Guiac negative. Consented for prbcs.

## 2018-04-27 NOTE — ED ADULT TRIAGE NOTE - CHIEF COMPLAINT QUOTE
AS per hospice RN pt has skin on abdomin that is opening and bleeding.  Pt is end stage live disease. Pt took 1mg of Ativan PTA.

## 2018-04-27 NOTE — ED PROVIDER NOTE - OBJECTIVE STATEMENT
pt is end stage liver failure, idiopathic cirrhosis, leukemia, in hospiace, had recent paracentesis, sent for bleeding at puncture site. pt is end stage liver failure, idiopathic cirrhosis, leukemia, in hospiace, had recent paracentesis, sent for bleeding at puncture site.  pt is from hospice, but was sent here for mgmt of acute bleeding from the abdominal puncture wound.

## 2018-04-28 LAB
ALBUMIN SERPL ELPH-MCNC: 1.9 G/DL — LOW (ref 3.5–5.2)
ALBUMIN SERPL ELPH-MCNC: 2.1 G/DL — LOW (ref 3.5–5.2)
ALP SERPL-CCNC: 622 U/L — HIGH (ref 30–115)
ALP SERPL-CCNC: 661 U/L — HIGH (ref 30–115)
ALT FLD-CCNC: 247 U/L — HIGH (ref 0–41)
ALT FLD-CCNC: 92 U/L — HIGH (ref 0–41)
ANION GAP SERPL CALC-SCNC: 14 MMOL/L — SIGNIFICANT CHANGE UP (ref 7–14)
ANION GAP SERPL CALC-SCNC: 15 MMOL/L — HIGH (ref 7–14)
ANION GAP SERPL CALC-SCNC: 16 MMOL/L — HIGH (ref 7–14)
ANION GAP SERPL CALC-SCNC: 17 MMOL/L — HIGH (ref 7–14)
ANION GAP SERPL CALC-SCNC: 19 MMOL/L — HIGH (ref 7–14)
ANISOCYTOSIS BLD QL: SLIGHT — SIGNIFICANT CHANGE UP
APPEARANCE UR: (no result)
APPEARANCE UR: (no result)
APTT BLD: 26.1 SEC — LOW (ref 27–39.2)
AST SERPL-CCNC: 111 U/L — HIGH (ref 0–41)
AST SERPL-CCNC: 286 U/L — HIGH (ref 0–41)
BACTERIA # UR AUTO: (no result) /HPF
BASOPHILS # BLD AUTO: 0.11 K/UL — SIGNIFICANT CHANGE UP (ref 0–0.2)
BASOPHILS # BLD AUTO: 0.14 K/UL — SIGNIFICANT CHANGE UP (ref 0–0.2)
BASOPHILS NFR BLD AUTO: 0.2 % — SIGNIFICANT CHANGE UP (ref 0–1)
BASOPHILS NFR BLD AUTO: 0.3 % — SIGNIFICANT CHANGE UP (ref 0–1)
BILIRUB DIRECT SERPL-MCNC: 4.3 MG/DL — HIGH (ref 0–0.2)
BILIRUB INDIRECT FLD-MCNC: 1 MG/DL — SIGNIFICANT CHANGE UP (ref 0.2–1.2)
BILIRUB SERPL-MCNC: 5.3 MG/DL — HIGH (ref 0.2–1.2)
BILIRUB SERPL-MCNC: 9.4 MG/DL — HIGH (ref 0.2–1.2)
BILIRUB UR-MCNC: (no result)
BILIRUB UR-MCNC: (no result)
BUN SERPL-MCNC: 44 MG/DL — HIGH (ref 10–20)
BUN SERPL-MCNC: 44 MG/DL — HIGH (ref 10–20)
BUN SERPL-MCNC: 45 MG/DL — HIGH (ref 10–20)
BUN SERPL-MCNC: 46 MG/DL — HIGH (ref 10–20)
BUN SERPL-MCNC: 46 MG/DL — HIGH (ref 10–20)
CALCIUM SERPL-MCNC: 7.3 MG/DL — LOW (ref 8.5–10.1)
CALCIUM SERPL-MCNC: 7.4 MG/DL — LOW (ref 8.5–10.1)
CALCIUM SERPL-MCNC: 7.5 MG/DL — LOW (ref 8.5–10.1)
CALCIUM SERPL-MCNC: 7.5 MG/DL — LOW (ref 8.5–10.1)
CALCIUM SERPL-MCNC: 7.7 MG/DL — LOW (ref 8.5–10.1)
CHLORIDE SERPL-SCNC: 87 MMOL/L — LOW (ref 98–110)
CHLORIDE SERPL-SCNC: 89 MMOL/L — LOW (ref 98–110)
CHLORIDE SERPL-SCNC: 93 MMOL/L — LOW (ref 98–110)
CHLORIDE SERPL-SCNC: 93 MMOL/L — LOW (ref 98–110)
CHLORIDE SERPL-SCNC: 94 MMOL/L — LOW (ref 98–110)
CO2 SERPL-SCNC: 13 MMOL/L — LOW (ref 17–32)
CO2 SERPL-SCNC: 16 MMOL/L — LOW (ref 17–32)
CO2 SERPL-SCNC: 16 MMOL/L — LOW (ref 17–32)
CO2 SERPL-SCNC: 17 MMOL/L — SIGNIFICANT CHANGE UP (ref 17–32)
CO2 SERPL-SCNC: 18 MMOL/L — SIGNIFICANT CHANGE UP (ref 17–32)
COLOR SPEC: (no result)
COLOR SPEC: YELLOW — SIGNIFICANT CHANGE UP
CREAT SERPL-MCNC: 1 MG/DL — SIGNIFICANT CHANGE UP (ref 0.7–1.5)
CREAT SERPL-MCNC: 1 MG/DL — SIGNIFICANT CHANGE UP (ref 0.7–1.5)
CREAT SERPL-MCNC: 1.1 MG/DL — SIGNIFICANT CHANGE UP (ref 0.7–1.5)
DIFF PNL FLD: (no result)
DIFF PNL FLD: NEGATIVE — SIGNIFICANT CHANGE UP
EOSINOPHIL # BLD AUTO: 0.09 K/UL — SIGNIFICANT CHANGE UP (ref 0–0.7)
EOSINOPHIL # BLD AUTO: 0.57 K/UL — SIGNIFICANT CHANGE UP (ref 0–0.7)
EOSINOPHIL NFR BLD AUTO: 0.2 % — SIGNIFICANT CHANGE UP (ref 0–8)
EOSINOPHIL NFR BLD AUTO: 1.1 % — SIGNIFICANT CHANGE UP (ref 0–8)
EPI CELLS # UR: (no result) /HPF
GAS PNL BLDV: SIGNIFICANT CHANGE UP
GLUCOSE SERPL-MCNC: 136 MG/DL — HIGH (ref 70–99)
GLUCOSE SERPL-MCNC: 162 MG/DL — HIGH (ref 70–99)
GLUCOSE SERPL-MCNC: 265 MG/DL — HIGH (ref 70–99)
GLUCOSE SERPL-MCNC: 503 MG/DL — CRITICAL HIGH (ref 70–99)
GLUCOSE SERPL-MCNC: 753 MG/DL — CRITICAL HIGH (ref 70–99)
GLUCOSE UR QL: >=1000
GLUCOSE UR QL: >=1000 MG/DL
HCT VFR BLD CALC: 20.7 % — LOW (ref 37–47)
HCT VFR BLD CALC: 21.1 % — LOW (ref 37–47)
HCT VFR BLD CALC: 29.6 % — LOW (ref 37–47)
HGB BLD-MCNC: 10.1 G/DL — LOW (ref 12–16)
HGB BLD-MCNC: 6.4 G/DL — CRITICAL LOW (ref 12–16)
HGB BLD-MCNC: 6.6 G/DL — CRITICAL LOW (ref 12–16)
HYPOCHROMIA BLD QL: SLIGHT — SIGNIFICANT CHANGE UP
IMM GRANULOCYTES NFR BLD AUTO: 0.8 % — HIGH (ref 0.1–0.3)
IMM GRANULOCYTES NFR BLD AUTO: 1.4 % — HIGH (ref 0.1–0.3)
INR BLD: 1.18 RATIO — SIGNIFICANT CHANGE UP (ref 0.65–1.3)
KETONES UR-MCNC: NEGATIVE — SIGNIFICANT CHANGE UP
KETONES UR-MCNC: NEGATIVE — SIGNIFICANT CHANGE UP
LEUKOCYTE ESTERASE UR-ACNC: (no result)
LEUKOCYTE ESTERASE UR-ACNC: NEGATIVE — SIGNIFICANT CHANGE UP
LYMPHOCYTES # BLD AUTO: 25.48 K/UL — HIGH (ref 1.2–3.4)
LYMPHOCYTES # BLD AUTO: 30.23 K/UL — HIGH (ref 1.2–3.4)
LYMPHOCYTES # BLD AUTO: 53.2 % — HIGH (ref 20.5–51.1)
LYMPHOCYTES # BLD AUTO: 60.4 % — HIGH (ref 20.5–51.1)
MCHC RBC-ENTMCNC: 27.5 PG — SIGNIFICANT CHANGE UP (ref 27–31)
MCHC RBC-ENTMCNC: 28.4 PG — SIGNIFICANT CHANGE UP (ref 27–31)
MCHC RBC-ENTMCNC: 28.9 PG — SIGNIFICANT CHANGE UP (ref 27–31)
MCHC RBC-ENTMCNC: 30.9 G/DL — LOW (ref 32–37)
MCHC RBC-ENTMCNC: 31.3 G/DL — LOW (ref 32–37)
MCHC RBC-ENTMCNC: 34.1 G/DL — SIGNIFICANT CHANGE UP (ref 32–37)
MCV RBC AUTO: 84.6 FL — SIGNIFICANT CHANGE UP (ref 81–99)
MCV RBC AUTO: 88.8 FL — SIGNIFICANT CHANGE UP (ref 81–99)
MCV RBC AUTO: 90.9 FL — SIGNIFICANT CHANGE UP (ref 81–99)
MICROCYTES BLD QL: SLIGHT — SIGNIFICANT CHANGE UP
MONOCYTES # BLD AUTO: 1.79 K/UL — HIGH (ref 0.1–0.6)
MONOCYTES # BLD AUTO: 2.65 K/UL — HIGH (ref 0.1–0.6)
MONOCYTES NFR BLD AUTO: 3.7 % — SIGNIFICANT CHANGE UP (ref 1.7–9.3)
MONOCYTES NFR BLD AUTO: 5.3 % — SIGNIFICANT CHANGE UP (ref 1.7–9.3)
NEUTROPHILS # BLD AUTO: 15.76 K/UL — HIGH (ref 1.4–6.5)
NEUTROPHILS # BLD AUTO: 20.06 K/UL — HIGH (ref 1.4–6.5)
NEUTROPHILS NFR BLD AUTO: 31.5 % — LOW (ref 42.2–75.2)
NEUTROPHILS NFR BLD AUTO: 41.9 % — LOW (ref 42.2–75.2)
NITRITE UR-MCNC: NEGATIVE — SIGNIFICANT CHANGE UP
NITRITE UR-MCNC: NEGATIVE — SIGNIFICANT CHANGE UP
NRBC # BLD: 0 /100 WBCS — SIGNIFICANT CHANGE UP (ref 0–0)
NRBC # BLD: 0 /100 — SIGNIFICANT CHANGE UP (ref 0–0)
PH UR: 5.5 — SIGNIFICANT CHANGE UP (ref 5–8)
PH UR: 6 — SIGNIFICANT CHANGE UP (ref 5–8)
PLAT MORPH BLD: NORMAL — SIGNIFICANT CHANGE UP
PLATELET # BLD AUTO: 200 K/UL — SIGNIFICANT CHANGE UP (ref 130–400)
PLATELET # BLD AUTO: 422 K/UL — HIGH (ref 130–400)
PLATELET # BLD AUTO: 461 K/UL — HIGH (ref 130–400)
PLATELET CLUMP BLD QL SMEAR: (no result)
PLATELET COUNT - ESTIMATE: (no result)
POTASSIUM SERPL-MCNC: 4.3 MMOL/L — SIGNIFICANT CHANGE UP (ref 3.5–5)
POTASSIUM SERPL-MCNC: 4.3 MMOL/L — SIGNIFICANT CHANGE UP (ref 3.5–5)
POTASSIUM SERPL-MCNC: 4.6 MMOL/L — SIGNIFICANT CHANGE UP (ref 3.5–5)
POTASSIUM SERPL-MCNC: 4.8 MMOL/L — SIGNIFICANT CHANGE UP (ref 3.5–5)
POTASSIUM SERPL-MCNC: 4.8 MMOL/L — SIGNIFICANT CHANGE UP (ref 3.5–5)
POTASSIUM SERPL-SCNC: 4.3 MMOL/L — SIGNIFICANT CHANGE UP (ref 3.5–5)
POTASSIUM SERPL-SCNC: 4.3 MMOL/L — SIGNIFICANT CHANGE UP (ref 3.5–5)
POTASSIUM SERPL-SCNC: 4.6 MMOL/L — SIGNIFICANT CHANGE UP (ref 3.5–5)
POTASSIUM SERPL-SCNC: 4.8 MMOL/L — SIGNIFICANT CHANGE UP (ref 3.5–5)
POTASSIUM SERPL-SCNC: 4.8 MMOL/L — SIGNIFICANT CHANGE UP (ref 3.5–5)
PROT SERPL-MCNC: 3.2 G/DL — LOW (ref 6–8)
PROT SERPL-MCNC: 3.3 G/DL — LOW (ref 6–8)
PROT UR-MCNC: NEGATIVE MG/DL — SIGNIFICANT CHANGE UP
PROT UR-MCNC: NEGATIVE — SIGNIFICANT CHANGE UP
PROTHROM AB SERPL-ACNC: 12.8 SEC — SIGNIFICANT CHANGE UP (ref 9.95–12.87)
RBC # BLD: 2.32 M/UL — LOW (ref 4.2–5.4)
RBC # BLD: 2.33 M/UL — LOW (ref 4.2–5.4)
RBC # BLD: 3.5 M/UL — LOW (ref 4.2–5.4)
RBC # FLD: 14.8 % — HIGH (ref 11.5–14.5)
RBC # FLD: 16.2 % — HIGH (ref 11.5–14.5)
RBC # FLD: 16.3 % — HIGH (ref 11.5–14.5)
RBC BLD AUTO: (no result)
RBC CASTS # UR COMP ASSIST: SIGNIFICANT CHANGE UP /HPF
SMUDGE CELLS # BLD: PRESENT — SIGNIFICANT CHANGE UP
SODIUM SERPL-SCNC: 119 MMOL/L — CRITICAL LOW (ref 135–146)
SODIUM SERPL-SCNC: 122 MMOL/L — LOW (ref 135–146)
SODIUM SERPL-SCNC: 125 MMOL/L — LOW (ref 135–146)
SODIUM SERPL-SCNC: 125 MMOL/L — LOW (ref 135–146)
SODIUM SERPL-SCNC: 126 MMOL/L — LOW (ref 135–146)
SP GR SPEC: 1.02 — SIGNIFICANT CHANGE UP (ref 1.01–1.03)
SP GR SPEC: 1.02 — SIGNIFICANT CHANGE UP (ref 1.01–1.03)
TYPE + AB SCN PNL BLD: SIGNIFICANT CHANGE UP
UROBILINOGEN FLD QL: 1 (ref 0.2–0.2)
UROBILINOGEN FLD QL: 1 MG/DL (ref 0.2–0.2)
WBC # BLD: 47.92 K/UL — CRITICAL HIGH (ref 4.8–10.8)
WBC # BLD: 50.07 K/UL — CRITICAL HIGH (ref 4.8–10.8)
WBC # BLD: 76.77 K/UL — CRITICAL HIGH (ref 4.8–10.8)
WBC # FLD AUTO: 47.92 K/UL — CRITICAL HIGH (ref 4.8–10.8)
WBC # FLD AUTO: 50.07 K/UL — CRITICAL HIGH (ref 4.8–10.8)
WBC # FLD AUTO: 76.77 K/UL — CRITICAL HIGH (ref 4.8–10.8)
WBC UR QL: SIGNIFICANT CHANGE UP /HPF

## 2018-04-28 PROCEDURE — 99233 SBSQ HOSP IP/OBS HIGH 50: CPT

## 2018-04-28 RX ORDER — SODIUM CHLORIDE 9 MG/ML
1000 INJECTION INTRAMUSCULAR; INTRAVENOUS; SUBCUTANEOUS
Qty: 0 | Refills: 0 | Status: DISCONTINUED | OUTPATIENT
Start: 2018-04-28 | End: 2018-04-28

## 2018-04-28 RX ORDER — PANTOPRAZOLE SODIUM 20 MG/1
40 TABLET, DELAYED RELEASE ORAL
Qty: 0 | Refills: 0 | Status: DISCONTINUED | OUTPATIENT
Start: 2018-04-28 | End: 2018-05-04

## 2018-04-28 RX ORDER — SODIUM CHLORIDE 9 MG/ML
1000 INJECTION INTRAMUSCULAR; INTRAVENOUS; SUBCUTANEOUS ONCE
Qty: 0 | Refills: 0 | Status: COMPLETED | OUTPATIENT
Start: 2018-04-28 | End: 2018-04-28

## 2018-04-28 RX ORDER — MEROPENEM 1 G/30ML
1000 INJECTION INTRAVENOUS ONCE
Qty: 0 | Refills: 0 | Status: COMPLETED | OUTPATIENT
Start: 2018-04-28 | End: 2018-04-28

## 2018-04-28 RX ORDER — SODIUM CHLORIDE 9 MG/ML
1000 INJECTION, SOLUTION INTRAVENOUS
Qty: 0 | Refills: 0 | Status: DISCONTINUED | OUTPATIENT
Start: 2018-04-28 | End: 2018-04-29

## 2018-04-28 RX ORDER — INSULIN LISPRO 100/ML
4 VIAL (ML) SUBCUTANEOUS
Qty: 0 | Refills: 0 | Status: DISCONTINUED | OUTPATIENT
Start: 2018-04-28 | End: 2018-04-30

## 2018-04-28 RX ORDER — DEXTROSE 50 % IN WATER 50 %
25 SYRINGE (ML) INTRAVENOUS ONCE
Qty: 0 | Refills: 0 | Status: DISCONTINUED | OUTPATIENT
Start: 2018-04-28 | End: 2018-05-01

## 2018-04-28 RX ORDER — MEROPENEM 1 G/30ML
INJECTION INTRAVENOUS
Qty: 0 | Refills: 0 | Status: DISCONTINUED | OUTPATIENT
Start: 2018-04-28 | End: 2018-05-04

## 2018-04-28 RX ORDER — ONDANSETRON 8 MG/1
4 TABLET, FILM COATED ORAL EVERY 6 HOURS
Qty: 0 | Refills: 0 | Status: DISCONTINUED | OUTPATIENT
Start: 2018-04-28 | End: 2018-05-04

## 2018-04-28 RX ORDER — PHENYLEPHRINE HYDROCHLORIDE 10 MG/ML
0.25 INJECTION INTRAVENOUS
Qty: 160 | Refills: 0 | Status: DISCONTINUED | OUTPATIENT
Start: 2018-04-28 | End: 2018-05-02

## 2018-04-28 RX ORDER — INSULIN HUMAN 100 [IU]/ML
3.5 INJECTION, SOLUTION SUBCUTANEOUS
Qty: 100 | Refills: 0 | Status: DISCONTINUED | OUTPATIENT
Start: 2018-04-28 | End: 2018-04-28

## 2018-04-28 RX ORDER — VANCOMYCIN HCL 1 G
1000 VIAL (EA) INTRAVENOUS ONCE
Qty: 0 | Refills: 0 | Status: COMPLETED | OUTPATIENT
Start: 2018-04-28 | End: 2018-04-28

## 2018-04-28 RX ORDER — INSULIN HUMAN 100 [IU]/ML
4 INJECTION, SOLUTION SUBCUTANEOUS
Qty: 100 | Refills: 0 | Status: DISCONTINUED | OUTPATIENT
Start: 2018-04-28 | End: 2018-04-28

## 2018-04-28 RX ORDER — MEROPENEM 1 G/30ML
1000 INJECTION INTRAVENOUS EVERY 8 HOURS
Qty: 0 | Refills: 0 | Status: DISCONTINUED | OUTPATIENT
Start: 2018-04-28 | End: 2018-05-04

## 2018-04-28 RX ORDER — DEXTROSE 50 % IN WATER 50 %
12.5 SYRINGE (ML) INTRAVENOUS ONCE
Qty: 0 | Refills: 0 | Status: DISCONTINUED | OUTPATIENT
Start: 2018-04-28 | End: 2018-05-01

## 2018-04-28 RX ORDER — INSULIN LISPRO 100/ML
0 VIAL (ML) SUBCUTANEOUS
Qty: 0 | Refills: 0 | COMMUNITY

## 2018-04-28 RX ORDER — HYDROXYZINE HCL 10 MG
25 TABLET ORAL
Qty: 0 | Refills: 0 | Status: DISCONTINUED | OUTPATIENT
Start: 2018-04-28 | End: 2018-05-04

## 2018-04-28 RX ORDER — SODIUM CHLORIDE 9 MG/ML
1000 INJECTION, SOLUTION INTRAVENOUS
Qty: 0 | Refills: 0 | Status: DISCONTINUED | OUTPATIENT
Start: 2018-04-28 | End: 2018-04-28

## 2018-04-28 RX ORDER — HYDROXYZINE HCL 10 MG
25 TABLET ORAL
Qty: 0 | Refills: 0 | Status: DISCONTINUED | OUTPATIENT
Start: 2018-04-28 | End: 2018-04-28

## 2018-04-28 RX ORDER — GLUCAGON INJECTION, SOLUTION 0.5 MG/.1ML
1 INJECTION, SOLUTION SUBCUTANEOUS ONCE
Qty: 0 | Refills: 0 | Status: DISCONTINUED | OUTPATIENT
Start: 2018-04-28 | End: 2018-05-04

## 2018-04-28 RX ORDER — PHENYLEPHRINE HYDROCHLORIDE 10 MG/ML
0.07 INJECTION INTRAVENOUS
Qty: 80 | Refills: 0 | Status: DISCONTINUED | OUTPATIENT
Start: 2018-04-28 | End: 2018-04-28

## 2018-04-28 RX ORDER — DOCUSATE SODIUM 100 MG
100 CAPSULE ORAL DAILY
Qty: 0 | Refills: 0 | Status: DISCONTINUED | OUTPATIENT
Start: 2018-04-28 | End: 2018-05-04

## 2018-04-28 RX ORDER — SPIRONOLACTONE 25 MG/1
100 TABLET, FILM COATED ORAL DAILY
Qty: 0 | Refills: 0 | Status: DISCONTINUED | OUTPATIENT
Start: 2018-04-28 | End: 2018-05-04

## 2018-04-28 RX ORDER — DEXTROSE 50 % IN WATER 50 %
1 SYRINGE (ML) INTRAVENOUS ONCE
Qty: 0 | Refills: 0 | Status: DISCONTINUED | OUTPATIENT
Start: 2018-04-28 | End: 2018-05-01

## 2018-04-28 RX ORDER — ONDANSETRON 8 MG/1
4 TABLET, FILM COATED ORAL ONCE
Qty: 0 | Refills: 0 | Status: COMPLETED | OUTPATIENT
Start: 2018-04-28 | End: 2018-04-28

## 2018-04-28 RX ORDER — CEFTRIAXONE 500 MG/1
2 INJECTION, POWDER, FOR SOLUTION INTRAMUSCULAR; INTRAVENOUS ONCE
Qty: 0 | Refills: 0 | Status: COMPLETED | OUTPATIENT
Start: 2018-04-28 | End: 2018-04-28

## 2018-04-28 RX ORDER — INSULIN GLARGINE 100 [IU]/ML
10 INJECTION, SOLUTION SUBCUTANEOUS AT BEDTIME
Qty: 0 | Refills: 0 | Status: DISCONTINUED | OUTPATIENT
Start: 2018-04-28 | End: 2018-04-30

## 2018-04-28 RX ORDER — INSULIN HUMAN 100 [IU]/ML
10 INJECTION, SOLUTION SUBCUTANEOUS ONCE
Qty: 0 | Refills: 0 | Status: COMPLETED | OUTPATIENT
Start: 2018-04-28 | End: 2018-04-28

## 2018-04-28 RX ORDER — FOLIC ACID 0.8 MG
1 TABLET ORAL DAILY
Qty: 0 | Refills: 0 | Status: DISCONTINUED | OUTPATIENT
Start: 2018-04-28 | End: 2018-05-04

## 2018-04-28 RX ORDER — DEXTROSE 50 % IN WATER 50 %
25 SYRINGE (ML) INTRAVENOUS ONCE
Qty: 0 | Refills: 0 | Status: DISCONTINUED | OUTPATIENT
Start: 2018-04-28 | End: 2018-05-04

## 2018-04-28 RX ORDER — MORPHINE SULFATE 50 MG/1
4 CAPSULE, EXTENDED RELEASE ORAL ONCE
Qty: 0 | Refills: 0 | Status: DISCONTINUED | OUTPATIENT
Start: 2018-04-28 | End: 2018-04-28

## 2018-04-28 RX ORDER — INSULIN HUMAN 100 [IU]/ML
5 INJECTION, SOLUTION SUBCUTANEOUS
Qty: 100 | Refills: 0 | Status: DISCONTINUED | OUTPATIENT
Start: 2018-04-28 | End: 2018-04-28

## 2018-04-28 RX ORDER — MORPHINE SULFATE 50 MG/1
2 CAPSULE, EXTENDED RELEASE ORAL ONCE
Qty: 0 | Refills: 0 | Status: DISCONTINUED | OUTPATIENT
Start: 2018-04-28 | End: 2018-04-28

## 2018-04-28 RX ORDER — INSULIN DETEMIR 100/ML (3)
0 INSULIN PEN (ML) SUBCUTANEOUS
Qty: 0 | Refills: 0 | COMMUNITY

## 2018-04-28 RX ADMIN — Medication 25 MILLIGRAM(S): at 18:36

## 2018-04-28 RX ADMIN — SODIUM CHLORIDE 200 MILLILITER(S): 9 INJECTION INTRAMUSCULAR; INTRAVENOUS; SUBCUTANEOUS at 02:18

## 2018-04-28 RX ADMIN — MEROPENEM 100 MILLIGRAM(S): 1 INJECTION INTRAVENOUS at 21:15

## 2018-04-28 RX ADMIN — SODIUM CHLORIDE 2000 MILLILITER(S): 9 INJECTION INTRAMUSCULAR; INTRAVENOUS; SUBCUTANEOUS at 05:44

## 2018-04-28 RX ADMIN — SODIUM CHLORIDE 200 MILLILITER(S): 9 INJECTION INTRAMUSCULAR; INTRAVENOUS; SUBCUTANEOUS at 07:19

## 2018-04-28 RX ADMIN — Medication 1 MILLIGRAM(S): at 21:15

## 2018-04-28 RX ADMIN — ONDANSETRON 4 MILLIGRAM(S): 8 TABLET, FILM COATED ORAL at 03:11

## 2018-04-28 RX ADMIN — INSULIN HUMAN 5 UNIT(S)/HR: 100 INJECTION, SOLUTION SUBCUTANEOUS at 06:15

## 2018-04-28 RX ADMIN — MEROPENEM 100 MILLIGRAM(S): 1 INJECTION INTRAVENOUS at 10:13

## 2018-04-28 RX ADMIN — PHENYLEPHRINE HYDROCHLORIDE 1.73 MICROGRAM(S)/KG/MIN: 10 INJECTION INTRAVENOUS at 22:41

## 2018-04-28 RX ADMIN — INSULIN HUMAN 3.5 UNIT(S)/HR: 100 INJECTION, SOLUTION SUBCUTANEOUS at 12:16

## 2018-04-28 RX ADMIN — SODIUM CHLORIDE 100 MILLILITER(S): 9 INJECTION, SOLUTION INTRAVENOUS at 21:15

## 2018-04-28 RX ADMIN — PHENYLEPHRINE HYDROCHLORIDE 1 MICROGRAM(S)/KG/MIN: 10 INJECTION INTRAVENOUS at 20:14

## 2018-04-28 RX ADMIN — INSULIN GLARGINE 10 UNIT(S): 100 INJECTION, SOLUTION SUBCUTANEOUS at 21:16

## 2018-04-28 RX ADMIN — SODIUM CHLORIDE 100 MILLILITER(S): 9 INJECTION, SOLUTION INTRAVENOUS at 23:57

## 2018-04-28 RX ADMIN — CEFTRIAXONE 100 GRAM(S): 500 INJECTION, POWDER, FOR SOLUTION INTRAMUSCULAR; INTRAVENOUS at 07:19

## 2018-04-28 RX ADMIN — MEROPENEM 100 MILLIGRAM(S): 1 INJECTION INTRAVENOUS at 18:36

## 2018-04-28 RX ADMIN — PHENYLEPHRINE HYDROCHLORIDE 1 MICROGRAM(S)/KG/MIN: 10 INJECTION INTRAVENOUS at 21:17

## 2018-04-28 RX ADMIN — Medication 25 MILLIGRAM(S): at 12:23

## 2018-04-28 RX ADMIN — Medication 1 MILLIGRAM(S): at 12:22

## 2018-04-28 RX ADMIN — SODIUM CHLORIDE 100 MILLILITER(S): 9 INJECTION, SOLUTION INTRAVENOUS at 12:17

## 2018-04-28 RX ADMIN — INSULIN HUMAN 3.5 UNIT(S)/HR: 100 INJECTION, SOLUTION SUBCUTANEOUS at 07:57

## 2018-04-28 RX ADMIN — Medication 100 MILLIGRAM(S): at 12:22

## 2018-04-28 RX ADMIN — Medication 250 MILLIGRAM(S): at 10:54

## 2018-04-28 RX ADMIN — INSULIN HUMAN 5 UNIT(S)/HR: 100 INJECTION, SOLUTION SUBCUTANEOUS at 21:16

## 2018-04-28 RX ADMIN — MORPHINE SULFATE 2 MILLIGRAM(S): 50 CAPSULE, EXTENDED RELEASE ORAL at 03:19

## 2018-04-28 RX ADMIN — ONDANSETRON 4 MILLIGRAM(S): 8 TABLET, FILM COATED ORAL at 04:04

## 2018-04-28 RX ADMIN — SODIUM CHLORIDE 2000 MILLILITER(S): 9 INJECTION INTRAMUSCULAR; INTRAVENOUS; SUBCUTANEOUS at 02:18

## 2018-04-28 RX ADMIN — Medication 25 MILLIGRAM(S): at 23:25

## 2018-04-28 RX ADMIN — INSULIN HUMAN 10 UNIT(S): 100 INJECTION, SOLUTION SUBCUTANEOUS at 02:17

## 2018-04-28 NOTE — H&P ADULT - NSHPREVIEWOFSYSTEMS_GEN_ALL_CORE
Review of Systems:   CONSTITUTIONAL: No fever, chills  ENMT:  No difficulty hearing, tinnitus, vertigo; No sinus or throat pain  NECK: No pain or stiffness  RESPIRATORY: No SOB   CARDIOVASCULAR: No chest pain, palpitations, dizziness, or leg swelling  GASTROINTESTINAL: + bleeding from paracentesis site. No abdominal or epigastric pain. No nausea, vomiting, or hematemesis; No diarrhea. No melena.   NEUROLOGICAL: No headaches

## 2018-04-28 NOTE — H&P ADULT - ASSESSMENT
53 yo F transfer from [facility] with PMHx of HTN, DM, CAD s/p CABG and stent (last stent 5 years ago), liver cirrhosis (idiopathic), CLL, ascites presents for profuse bleeding from the paracentesis site s/p suturing by surgery at [facility].    # bleeding from paracentesis site and elevated WBC. h/o idiopathic cirrhosis and CLL  - WBC 50 likely multifactorial related to CLL and r/o intraabdominal infection and   - bleeding s/p stitching  - f/u surgery  - will transfuse 2 unit pRBC  - monitor H/H  - palliative care c/s and oncology c/s   - start rocephin 2G q24  - f/u US abdomen    # DKA  - s/p 2 L NS bolus  - on insulin   - f/u acetone  - f/u BMP q2. monitor anion gap  - finger stick  - switch to D5 half NS and subcut insulin when gap is closed, Bicarb > 15 and FS < 200.     # HFrEF (EF20-25%)  - Keep I= O  - strict Is and Os    # CAD s/p PCI (CABG, stent), HTN  - hold asa, hold bp medications    # DVT ppx: SCD  - on protonix 40mg po 53 yo F transfer from [facility] with PMHx of HTN, DM, CAD s/p CABG and stent (last stent 5 years ago), liver cirrhosis (idiopathic), CLL, ascites presents for profuse bleeding from the paracentesis site s/p suturing by surgery at [facility].    # bleeding from paracentesis site and elevated WBC. h/o idiopathic cirrhosis and CLL  - WBC 50 likely multifactorial related to CLL and r/o intraabdominal infection and   - bleeding s/p stitching  - f/u surgery  - will transfuse 2 unit pRBC  - monitor H/H  - palliative care c/s and consider hematology   - start meropenam 1000mg q8  - f/u US abdomen    # DKA  - s/p 2 L NS bolus  - on insulin drip  - f/u acetone  - f/u BMP q2. monitor anion gap  - finger stick  - switch to D5 half NS and subcut insulin when gap is closed, Bicarb > 15 and FS < 200.     # HFrEF (EF20-25%)  - Keep I= O  - strict Is and Os    # CAD s/p PCI (CABG, stent), HTN  - hold asa, hold bp medications    # DVT ppx: SCD  - on protonix 40mg po     CODE status: DNR/DNI 51 yo F transfer from [facility] with PMHx of HTN, DM, CAD s/p CABG and stent (last stent 5 years ago), liver cirrhosis (idiopathic), CLL, ascites presents for profuse bleeding from the paracentesis site s/p suturing by surgery at [facility].    # bleeding from paracentesis site and elevated WBC. h/o idiopathic cirrhosis and CLL  - WBC 50 likely multifactorial related to CLL and r/o intraabdominal infection   - Bleeding resolved s/p stitching  - f/u surgery  - will transfuse 2 unit pRBC  - monitor H/H  - palliative care c/s and consider hematology   - start meropenam 1000mg q8 and follow up with ID   - pan culture follow up  - f/u US abdomen    # DKA  - s/p 2 L NS bolus. c/w NS 75cc/ hr  - on insulin drip  - f/u acetone  - f/u BMP q2. monitor anion gap  - finger stick  - switch to D5 half NS and subcutaneuos insulin when gap is closed, Bicarb > 15 and FS < 200.     # HFrEF (EF20-25%)  - Keep I= O  - strict Is and Os    # CAD s/p PCI (CABG, stent), HTN  - hold asa, hold bp medications    # DVT ppx: SCD  - on protonix 40mg po     CODE status: DNR/DNI 51 yo F transfer from [facility] with PMHx of HTN, DM, CAD s/p CABG and stent (last stent 5 years ago), liver cirrhosis (idiopathic), CLL, ascites presents for profuse bleeding from the paracentesis site s/p suturing by surgery at [facility].    # bleeding from paracentesis site and elevated WBC. h/o idiopathic cirrhosis and CLL  - WBC 50 likely multifactorial related to CLL and r/o intraabdominal infection   - Bleeding resolved s/p stitching  - f/u surgery  - will transfuse 2 unit pRBC  - monitor H/H  - palliative care c/s and consider hematology   - start meropenam 1000mg q8 and follow up with ID   - pan culture follow up  - f/u US abdomen    # DKA  - s/p 2 L NS bolus. c/w NS 75cc/ hr  - on insulin drip  - f/u acetone  - f/u BMP q2. monitor anion gap  - finger stick  - switch to D5 half NS and subcutaneuos insulin when gap is closed, Bicarb > 15 and FS < 200.     # HFrEF (EF20-25%)  - strict Is and Os  - monitor for signs of fluid overlead    # CAD s/p PCI (CABG, stent), HTN  - hold asa, hold bp medications    # Medication clarification  - pt takes Cipro three times a week. family unable to remember the reason, but mention Dr. Harris prescribed for a recent infection.  - clarify with Dr. Harris's team    # DVT ppx: SCD  - on protonix 40mg po     CODE status: DNR/DNI

## 2018-04-28 NOTE — CONSULT NOTE ADULT - ATTENDING COMMENTS
Pt with cryptogenic cirrhosis and liver failure.   Pt with Chronic Leukemia and unable to get liver transplant.  Pt has massive hernia which has been deemed un operable by many surgeons.   Pt has been on Hospice.  Bleeding   from abdominal wound s/p bedside stiches x 2    Check labs ,   Resuscitate.   Correct Coagulopathy.   Admit to Hospice  Bacterial Peritonitis prophylaxis.

## 2018-04-28 NOTE — H&P ADULT - NSHPPHYSICALEXAM_GEN_ALL_CORE
GENERAL: Frail. C achexic  HEAD:  Atraumatic, Normocephalic  CHEST/LUNG: Clear to auscultation bilaterally; No wheeze  HEART: Regular rate and rhythm  ABDOMEN: + abdominal binder dressing. + stitch on paracentesis drainage site  EXTREMITIES:  no edema  NEUROLOGY: non-focal

## 2018-04-28 NOTE — PROGRESS NOTE ADULT - SUBJECTIVE AND OBJECTIVE BOX
Patient recently arrived in [place] ED. No active complaints and no leakage from abdomen that was packed in [facility] ED.    PAST MEDICAL & SURGICAL HISTORY:  Psoriasis  HTN (hypertension)  CAD (coronary artery disease)  Diverticulitis  HTN (hypertension)  DM (diabetes mellitus)  Liver cirrhosis  Stented coronary artery  S/P CABG (coronary artery bypass graft)  CLL (chronic lymphocytic leukemia)  Hernia  S/P cystoscopy  H/O colostomy  S/P CABG (coronary artery bypass graft)  History of colon resection    Vital Signs Last 24 Hrs  T(C): 36.9 (28 Apr 2018 04:33), Max: 37.2 (28 Apr 2018 00:36)  T(F): 98.5 (28 Apr 2018 04:33), Max: 98.9 (28 Apr 2018 00:36)  HR: 110 (28 Apr 2018 05:44) (110 - 117)  BP: 76/47 (28 Apr 2018 05:44) (76/47 - 95/55)  BP(mean): --  RR: 16 (28 Apr 2018 05:44) (16 - 18)  SpO2: 98% (28 Apr 2018 05:44) (98% - 100%)                 6.6    50.07 )-----------( 422      ( 04-28 @ 00:32 )             21.1                119   |  87    |  44                 Ca: 7.7    BMP:   ----------------------------< 753    Mg: x     (04-28-18 @ 00:32)             4.8    |  13    | 1.0                Ph: x          PT/INR - ( 28 Apr 2018 00:32 )   PT: 12.80 sec;   INR: 1.18 ratio      PTT - ( 28 Apr 2018 00:32 )  PTT:26.1 sec    MEDICATIONS  (STANDING):  cefTRIAXone   IVPB 2 Gram(s) IV Intermittent Once  sodium chloride 0.9%. 1000 milliLiter(s) (200 mL/Hr) IV Continuous <Continuous>  vancomycin  IVPB 1000 milliGRAM(s) IV Intermittent once    MEDICATIONS  (PRN):    PHYSICAL EXAM:  General Appearance: NAD, icteric  Neck: Supple  Chest: Equal expansion bilaterally, equal breath sounds  CV: S1, S2, RRR  Abdomen: Soft, distended, no leakage at site	  Extremities: Grossly symmetric, WNL

## 2018-04-28 NOTE — H&P ADULT - HISTORY OF PRESENT ILLNESS
51 yo F transfer from [facility] with PMHx of HTN, DM, CAD s/p CABG and stent (last stent 5 years ago), liver cirrhosis (idiopathic), CLL, ascites, s/p Hartmans and reversal for complicated diverticulitis and colovesicular fistula 3-4 years ago, has incisional hernia associated with previous parastomal site, large, nonobstructive, not surgical candidate, currently in hospice. Pt also has hepato-renal syndrome and abdominal skin maceration with 1 cm wound s/p closure.   Pt recently had leaking of ascites from the hernia for which surgery (Dr. Harris's team) had sutured her. After that the family wished it would be easier on the patient to not suture but to put a colostomy bag on the leaking site. She has been draining on avarage 2-3 liter every few days. Pt underwent a paracentesis on Monday, 4/23/18. Pt came to the ER yesterday with profuse bleeding from the abdomen .   Pt denies fever, chills, SOB, CP, abdominal pain, N/V/D.    - IN ED, pt found to have drop in H/H 9 -> 6.6, WBC 50 and in DKA.   - pt resended her hospice to get 2 unit of prbc transfusion.   - pt is s/p 2 L NS bolus, on insulin drip and  cc/hr

## 2018-04-28 NOTE — PROGRESS NOTE ADULT - ASSESSMENT
51yo F with liver cirrhosis, leaking ascites. Stitch put in and sutured in the south side and transferred over to the north side for further care. Will be transfused here and receive further care. Admit to medicine. 51yo F with liver cirrhosis, leaking ascites. Stitch put in and sutured in the south side and transferred over to the north side for further care. Will be transfused here and receive further care. Admit to medicine.  53 y/o f with liver cirrhosis /liver failure p/w to south site with bleeding from abdominal wall s/p suture , bleeding controlled : dressing applied s/p bl.trasfusion 1 u   a/p no surgical intevention   -medical mngt for ascites   - if rebleed will suture   -trasfuse blood as needed

## 2018-04-28 NOTE — H&P ADULT - NSHPLABSRESULTS_GEN_ALL_CORE
T(C): 36.9 (04-28-18 @ 04:33), Max: 37.2 (04-28-18 @ 00:36)  T(F): 98.5 (04-28-18 @ 04:33), Max: 98.9 (04-28-18 @ 00:36)  HR: 110 (04-28-18 @ 06:36) (110 - 117)  BP: 99/61 (04-28-18 @ 06:36) (76/47 - 99/61)  RR: 16 (04-28-18 @ 05:44) (16 - 18)  SpO2: 98% (04-28-18 @ 05:44) (98% - 100%)  Labs:                         6.6<LL>  50.07<HH>   )-----------(   422<H>    ( 28 Apr 2018 00:32 )              21.1<L>    Neutro%  31.5<L>   Lympho%  60.4<H>   Mono%    5.3     Bands    x        04-28    119<LL>  |  87<L>  |  44<H>  ----------------------------<  753<HH>  4.8   |  13<L>  |  1.0    Ca    7.7<L>      28 Apr 2018 00:32      eGFR if Non African American: 65 mL/min/1.73M2 (04-28-18 @ 00:32)  eGFR if : 76 mL/min/1.73M2 (04-28-18 @ 00:32)      ( 28 Apr 2018 00:32 )   PT: 12.80 sec;   INR: 1.18 ratio;       PTT:26.1 sec      Venous Blood Gas:  04-28 @ 05:28  7.26/30/30/13/39  VBG Lactate: 9.4    EKG: no EKG in chart  - will order    CXR: no acute pathology. follow up with official report

## 2018-04-28 NOTE — ED ADULT NURSE REASSESSMENT NOTE - NS ED NURSE REASSESS COMMENT FT1
as per blood bank, Type and Screen is not ready yet, lab states type and screen will be complete in 45 minutes and blood products will be ready at that time. will closely monitor and assess. will follow-up.
blood glucose 596, MD Grace aware, Insulin drip continued as per MD order.
blood glucose undetectable high, MD Boyle made aware and insulin drip started.
blood pressure 76/47, MD aware. fluids infusing as per MD order. continuous cardiac, pulse ox, and blood pressure monitoring in place. will closely monitor and assess. fall risk precautions maintained.
patient transferred form [facility], alert and in no distress. RN unable to obtain IV access or draw blood, MD Grace made aware and will attempt with ultrasound
pt is a transfer from [facility] surgery consult
spoke with blood bank, type and screen and blood products are still not ready at this time, will follow-up.

## 2018-04-29 LAB
ANION GAP SERPL CALC-SCNC: 14 MMOL/L — SIGNIFICANT CHANGE UP (ref 7–14)
ANION GAP SERPL CALC-SCNC: 15 MMOL/L — HIGH (ref 7–14)
ANION GAP SERPL CALC-SCNC: 16 MMOL/L — HIGH (ref 7–14)
ANION GAP SERPL CALC-SCNC: 16 MMOL/L — HIGH (ref 7–14)
BUN SERPL-MCNC: 45 MG/DL — HIGH (ref 10–20)
BUN SERPL-MCNC: 46 MG/DL — HIGH (ref 10–20)
BUN SERPL-MCNC: 46 MG/DL — HIGH (ref 10–20)
BUN SERPL-MCNC: 47 MG/DL — HIGH (ref 10–20)
CALCIUM SERPL-MCNC: 7.4 MG/DL — LOW (ref 8.5–10.1)
CALCIUM SERPL-MCNC: 7.4 MG/DL — LOW (ref 8.5–10.1)
CALCIUM SERPL-MCNC: 7.5 MG/DL — LOW (ref 8.5–10.1)
CALCIUM SERPL-MCNC: 7.6 MG/DL — LOW (ref 8.5–10.1)
CHLORIDE SERPL-SCNC: 88 MMOL/L — LOW (ref 98–110)
CHLORIDE SERPL-SCNC: 89 MMOL/L — LOW (ref 98–110)
CHLORIDE SERPL-SCNC: 90 MMOL/L — LOW (ref 98–110)
CHLORIDE SERPL-SCNC: 90 MMOL/L — LOW (ref 98–110)
CO2 SERPL-SCNC: 16 MMOL/L — LOW (ref 17–32)
CO2 SERPL-SCNC: 17 MMOL/L — SIGNIFICANT CHANGE UP (ref 17–32)
CREAT SERPL-MCNC: 1.1 MG/DL — SIGNIFICANT CHANGE UP (ref 0.7–1.5)
CULTURE RESULTS: NO GROWTH — SIGNIFICANT CHANGE UP
ESTIMATED AVERAGE GLUCOSE: 128 MG/DL — HIGH (ref 68–114)
GLUCOSE SERPL-MCNC: 130 MG/DL — HIGH (ref 70–99)
GLUCOSE SERPL-MCNC: 152 MG/DL — HIGH (ref 70–99)
GLUCOSE SERPL-MCNC: 157 MG/DL — HIGH (ref 70–99)
GLUCOSE SERPL-MCNC: 179 MG/DL — HIGH (ref 70–99)
HBA1C BLD-MCNC: 6.1 % — HIGH (ref 4–5.6)
HCT VFR BLD CALC: 29.3 % — LOW (ref 37–47)
HGB BLD-MCNC: 10.3 G/DL — LOW (ref 12–16)
MAGNESIUM SERPL-MCNC: 1.5 MG/DL — LOW (ref 1.8–2.4)
MCHC RBC-ENTMCNC: 28.9 PG — SIGNIFICANT CHANGE UP (ref 27–31)
MCHC RBC-ENTMCNC: 35.2 G/DL — SIGNIFICANT CHANGE UP (ref 32–37)
MCV RBC AUTO: 82.3 FL — SIGNIFICANT CHANGE UP (ref 81–99)
NRBC # BLD: 0 /100 WBCS — SIGNIFICANT CHANGE UP (ref 0–0)
PHOSPHATE SERPL-MCNC: 3.2 MG/DL — SIGNIFICANT CHANGE UP (ref 2.1–4.9)
PLATELET # BLD AUTO: 264 K/UL — SIGNIFICANT CHANGE UP (ref 130–400)
POTASSIUM SERPL-MCNC: 4.4 MMOL/L — SIGNIFICANT CHANGE UP (ref 3.5–5)
POTASSIUM SERPL-MCNC: 4.6 MMOL/L — SIGNIFICANT CHANGE UP (ref 3.5–5)
POTASSIUM SERPL-MCNC: 4.7 MMOL/L — SIGNIFICANT CHANGE UP (ref 3.5–5)
POTASSIUM SERPL-MCNC: 4.7 MMOL/L — SIGNIFICANT CHANGE UP (ref 3.5–5)
POTASSIUM SERPL-SCNC: 4.4 MMOL/L — SIGNIFICANT CHANGE UP (ref 3.5–5)
POTASSIUM SERPL-SCNC: 4.6 MMOL/L — SIGNIFICANT CHANGE UP (ref 3.5–5)
POTASSIUM SERPL-SCNC: 4.7 MMOL/L — SIGNIFICANT CHANGE UP (ref 3.5–5)
POTASSIUM SERPL-SCNC: 4.7 MMOL/L — SIGNIFICANT CHANGE UP (ref 3.5–5)
RBC # BLD: 3.56 M/UL — LOW (ref 4.2–5.4)
RBC # FLD: 15.5 % — HIGH (ref 11.5–14.5)
SODIUM SERPL-SCNC: 118 MMOL/L — CRITICAL LOW (ref 135–146)
SODIUM SERPL-SCNC: 121 MMOL/L — LOW (ref 135–146)
SODIUM SERPL-SCNC: 122 MMOL/L — LOW (ref 135–146)
SODIUM SERPL-SCNC: 122 MMOL/L — LOW (ref 135–146)
SPECIMEN SOURCE: SIGNIFICANT CHANGE UP
WBC # BLD: 66.65 K/UL — CRITICAL HIGH (ref 4.8–10.8)
WBC # FLD AUTO: 66.65 K/UL — CRITICAL HIGH (ref 4.8–10.8)

## 2018-04-29 RX ADMIN — PHENYLEPHRINE HYDROCHLORIDE 1.73 MICROGRAM(S)/KG/MIN: 10 INJECTION INTRAVENOUS at 20:52

## 2018-04-29 RX ADMIN — Medication 25 MILLIGRAM(S): at 12:12

## 2018-04-29 RX ADMIN — Medication 1 MILLIGRAM(S): at 21:01

## 2018-04-29 RX ADMIN — PANTOPRAZOLE SODIUM 40 MILLIGRAM(S): 20 TABLET, DELAYED RELEASE ORAL at 12:15

## 2018-04-29 RX ADMIN — MEROPENEM 100 MILLIGRAM(S): 1 INJECTION INTRAVENOUS at 05:32

## 2018-04-29 RX ADMIN — Medication 4 UNIT(S): at 08:44

## 2018-04-29 RX ADMIN — MEROPENEM 100 MILLIGRAM(S): 1 INJECTION INTRAVENOUS at 13:34

## 2018-04-29 RX ADMIN — Medication 100 MILLIGRAM(S): at 12:12

## 2018-04-29 RX ADMIN — Medication 4 UNIT(S): at 17:19

## 2018-04-29 RX ADMIN — INSULIN GLARGINE 10 UNIT(S): 100 INJECTION, SOLUTION SUBCUTANEOUS at 21:02

## 2018-04-29 RX ADMIN — Medication 4 UNIT(S): at 12:07

## 2018-04-29 RX ADMIN — Medication 600 MILLIGRAM(S): at 05:32

## 2018-04-29 RX ADMIN — Medication 25 MILLIGRAM(S): at 05:33

## 2018-04-29 RX ADMIN — Medication 25 MILLIGRAM(S): at 17:20

## 2018-04-29 RX ADMIN — MEROPENEM 100 MILLIGRAM(S): 1 INJECTION INTRAVENOUS at 21:02

## 2018-04-29 RX ADMIN — SPIRONOLACTONE 100 MILLIGRAM(S): 25 TABLET, FILM COATED ORAL at 12:19

## 2018-04-29 RX ADMIN — Medication 1 MILLIGRAM(S): at 12:12

## 2018-04-29 NOTE — PROGRESS NOTE ADULT - ASSESSMENT
52F with CLL HD 2 s/p bleeding of abdominal wall vein with significant loss of blood. Plan is to continue care as per medicine team, dressing changes PRN. will continue to follow. Recommend rediscussed with sister and patient regarding goals of care

## 2018-04-29 NOTE — PROGRESS NOTE ADULT - ASSESSMENT
53 yo F transfer from [facility] with PMHx of HTN, DM, CAD s/p CABG and stent (last stent 5 years ago), liver cirrhosis (idiopathic), CLL, ascites presents for profuse bleeding from the paracentesis site s/p suturing by surgery at [facility].    # bleeding from paracentesis site and elevated WBC. h/o idiopathic cirrhosis and CLL  - WBC 50 likely multifactorial related to CLL and r/o intraabdominal infection   - Bleeding resolved s/p stitching  - s/p 2u PRBC, H/H stable  - monitor H/H  - f/u surgery  - start meropenam 1000mg q8 and follow up with ID   - pan culture follow up    # DKA  - switched to insulin glargine and lispo  - tolerating PO    # HFrEF (EF20-25%)  - strict Is and Os  - monitor for signs of fluid overlead    # CAD s/p PCI (CABG, stent), HTN  - hold asa, hold bp medications    # DVT ppx: SCD  - on protonix 40mg po     CODE status: DNR/DNI

## 2018-04-29 NOTE — PROGRESS NOTE ADULT - SUBJECTIVE AND OBJECTIVE BOX
52y Female PAST MEDICAL & SURGICAL HISTORY:  Psoriasis  HTN (hypertension)  CAD (coronary artery disease)  Diverticulitis  HTN (hypertension)  DM (diabetes mellitus)  Liver cirrhosis  Stented coronary artery  S/P CABG (coronary artery bypass graft)  CLL (chronic lymphocytic leukemia)  Hernia  S/P cystoscopy  H/O colostomy  S/P CABG (coronary artery bypass graft)  History of colon resection      Hospital Day: 2    Events of the Last 24h: patient was transfused blood,  admitted to medical service and CCU, hospice rescinded in order for Rx to proceed but remains DNI, DNR. no active bleeding    Subjective: patient sleeping comfortably but does report nausea ad what appears to be post nasal drip    Vital Signs Last 24 Hrs  T(C): 36.3 (2018 00:06), Max: 37 (2018 11:45)  T(F): 97.4 (2018 00:06), Max: 98.6 (2018 11:45)  HR: 100 (2018 03:06) (96 - 117)  BP: 92/57 (2018 03:06) (73/46 - 104/55)  BP(mean): 64 (2018 03:06) (53 - 74)  RR: 17 (2018 03:06) (14 - 23)  SpO2: 98% (2018 03:06) (98% - 100%)      I&O's Detail    2018 07:01  -  2018 03:54  --------------------------------------------------------  IN:    dextrose 5% + sodium chloride 0.45%.: 100 mL    insulin Infusion: 22 mL    IV PiggyBack: 300 mL    Packed Red Blood Cells: 580 mL    phenylephrine   Infusion: 4.5 mL    phenylephrine   Infusion: 16 mL    sodium chloride 0.45%.: 1700 mL  Total IN: 2722.5 mL    OUT:    Voided: 600 mL  Total OUT: 600 mL    Total NET: 2122.5 mL          PHYSICAL EXAM:    GENERAL: NAD    HEENT: NCAT    CHEST/LUNGS: CTAB    HEART: RRR,  No murmurs, rubs, or gallops    ABDOMEN: Patient/sister (caregiver) requested binder not be moved as she was finally sleeping and comfortable. states it was just examined and there are no signs of active bleeding    EXTREMITIES:  FROM, No clubbing, cyanosis, or edema, palpable pulse    NEURO: No focal neurological deficits    SKIN: No rashes or lesions    Diet, NPO:   With Ice Chips/Sips of Water (18 @ 11:57)    MEDICATIONS  (STANDING):  dextrose 5%. 1000 milliLiter(s) (50 mL/Hr) IV Continuous <Continuous>  dextrose 50% Injectable 12.5 Gram(s) IV Push once  dextrose 50% Injectable 25 Gram(s) IV Push once  dextrose 50% Injectable 25 Gram(s) IV Push once  docusate sodium 100 milliGRAM(s) Oral daily  folic acid 1 milliGRAM(s) Oral daily  guaiFENesin  milliGRAM(s) Oral every 12 hours  hydrOXYzine hydrochloride 25 milliGRAM(s) Oral four times a day  insulin glargine Injectable (LANTUS) 10 Unit(s) SubCutaneous at bedtime  insulin lispro Injectable (HumaLOG) 4 Unit(s) SubCutaneous before breakfast  insulin lispro Injectable (HumaLOG) 4 Unit(s) SubCutaneous before lunch  insulin lispro Injectable (HumaLOG) 4 Unit(s) SubCutaneous before dinner  meropenem  IVPB      meropenem  IVPB 1000 milliGRAM(s) IV Intermittent every 8 hours  pantoprazole    Tablet 40 milliGRAM(s) Oral before breakfast  phenylephrine    Infusion 0.25 MICROgram(s)/kG/Min (1.73 mL/Hr) IV Continuous <Continuous>  sodium chloride 0.45%. 1000 milliLiter(s) (100 mL/Hr) IV Continuous <Continuous>  spironolactone 100 milliGRAM(s) Oral daily    MEDICATIONS  (PRN):  dextrose Gel 1 Dose(s) Oral once PRN Blood Glucose LESS THAN 70 milliGRAM(s)/deciliter  glucagon  Injectable 1 milliGRAM(s) IntraMuscular once PRN Glucose LESS THAN 70 milligrams/deciliter  LORazepam     Tablet 1 milliGRAM(s) Oral three times a day PRN Anxiety  ondansetron Injectable 4 milliGRAM(s) IV Push every 6 hours PRN Nausea and/or Vomiting                              10.1   47.92 )-----------( 200      ( 2018 15:06 )             29.6                       122   |  90    |  46                 Ca: 7.4    BMP:   ----------------------------< 157    Mg: x     (18 @ 00:57)             4.4    |  17    | 1.1                Ph: x        LFT:     TPro: 3.2 / Alb: 1.9 / TBili: 9.4 / DBili: x / AST: 286 / ALT: 247 / AlkPhos: 622   (18 @ 20:38)    LFTs:             3.2  | 9.4  | 286      ------------------[622     ( 2018 20:38 )  1.9  | x    | 247         Lipase:x      Amylase:x        Coags:     12.80  ----< 1.18    ( 2018 00:32 )     26.1            Urinalysis Basic - ( 2018 16:20 )    Color: Orange / Appearance: Cloudy / S.025 / pH: x  Gluc: x / Ketone: Negative  / Bili: Moderate / Urobili: 1.0 mg/dL   Blood: x / Protein: Negative mg/dL / Nitrite: Negative   Leuk Esterase: Negative / RBC: 1-2 /HPF / WBC 1-2 /HPF   Sq Epi: x / Non Sq Epi: Occasional /HPF / Bacteria: Few /HPF          IMAGING:    PATHOLOGY:      SPECTRA:

## 2018-04-29 NOTE — CONSULT NOTE ADULT - SUBJECTIVE AND OBJECTIVE BOX
51 yo F with PMH/PSH: CAD, liver cirrhosis (idiopathic ?), CLL, ascites, s/p Hartmans and reversal for complicated diverticulitis and colovesicular fistula 3-4 years ago, has incisional hernia associated with previous parastomal site, large, nonobstructive, not surgical candidate, currently in hospice. Had recent hepato-renal syndrome and abdominal skin maceration with 1 cm wound leaking, bedside closure with 2 stiches.   Recently had leaking of ascites from the hernia for which we had sutured her. After that the family wished it would be easier on the patient to not suture but to put a colostomy bag on the leaking site. She has been draining on avarage 2-3 liter every few days. She under an ascites tapping earlier this week. She had called the office that her abdomen was getting bigger today and I had recommended her to get a repeat sonogram and tapping.     She came to the ER with profuse bleading from the abdomen .         Physical exam:  Gen: alert , awake , very irritated  Chest : NAD  skin : icturus  Abd: soft, NT, Severly Distended, old sutures intact , two areas of skin near veins bleeding profusely.   extremity : non tender.
Patient is a 52y old  Female who presents with a chief complaint of profuse bleeding from paracentesis site (2018 06:59)      HPI:  51 yo F transfer from [facility] with PMHx of HTN, DM, CAD s/p CABG and stent (last stent 5 years ago), liver cirrhosis (idiopathic), CLL, ascites, s/p Hartmans and reversal for complicated diverticulitis and colovesicular fistula 3-4 years ago, has incisional hernia associated with previous parastomal site, large, nonobstructive, not surgical candidate, currently in hospice. Pt also has hepato-renal syndrome and abdominal skin maceration with 1 cm wound s/p closure.   Pt recently had leaking of ascites from the hernia for which surgery (Dr. Harris's team) had sutured her. After that the family wished it would be easier on the patient to not suture but to put a colostomy bag on the leaking site. She has been draining on avarage 2-3 liter every few days. Pt underwent a paracentesis on Monday, 18. Pt came to the ER yesterday with profuse bleeding from the abdomen .   Pt denies fever, chills, SOB, CP, abdominal pain, N/V/D.    - IN ED, pt found to have drop in H/H 9 -> 6.6, WBC 50 and in DKA.   - pt resended her hospice to get 2 unit of prbc transfusion.   - pt is s/p 2 L NS bolus, on insulin drip and  cc/hr (2018 06:59)      PAST MEDICAL & SURGICAL HISTORY:  Psoriasis  HTN (hypertension)  CAD (coronary artery disease)  Diverticulitis  HTN (hypertension)  DM (diabetes mellitus)  Liver cirrhosis  Stented coronary artery  S/P CABG (coronary artery bypass graft)  CLL (chronic lymphocytic leukemia)  Hernia  S/P cystoscopy  H/O colostomy  S/P CABG (coronary artery bypass graft)  History of colon resection      SOCIAL HX:   Smoking                         ETOH                            Other    FAMILY HISTORY:  Family history of cirrhosis of liver (Mother, Grandparent)      REVIEW OF SYSTEMS      General:	    Skin/Breast:  	  Ophthalmologic:  	  ENMT:	    Respiratory and Thorax:  	  Cardiovascular:	    Gastrointestinal:	    Genitourinary:	    Musculoskeletal:	    Neurological:	    Psychiatric:	    Hematology/Lymphatics:	    Endocrine:	    Allergic/Immunologic:	    Allergies    No Known Allergies    Intolerances          PHYSICAL EXAM    ICU Vital Signs Last 24 Hrs  T(C): 36.6 (2018 04:06), Max: 37 (2018 11:45)  T(F): 97.8 (2018 04:06), Max: 98.6 (2018 11:45)  HR: 92 (2018 06:06) (92 - 116)  BP: 90/60 (2018 06:06) (73/46 - 104/55)  BP(mean): 75 (2018 06:06) (53 - 75)  ABP: --  ABP(mean): --  RR: 22 (2018 06:06) (14 - 32)  SpO2: 100% (2018 06:06) (98% - 100%)            General:  HEENT:                Lymph Nodes:  Lungs: Bilateral BS  Cardiovascular: Regular  Abdomen: Soft, Positive BS distendd  Extremities: No clubbing  Skin:   Neurological: a/a        LABS:                          10.3   66.65 )-----------( 264      ( 2018 04:20 )             29.3                                               0429    118<LL>  |  88<L>  |  46<H>  ----------------------------<  152<H>  4.6   |  16<L>  |  1.1    Ca    7.4<L>      2018 04:20  Phos  3.2     29  Mg     1.5         TPro  3.2<L>  /  Alb  1.9<L>  /  TBili  9.4<H>  /  DBili  x   /  AST  286<H>  /  ALT  247<H>  /  AlkPhos  622<H>  28      PT/INR - ( 2018 00:32 )   PT: 12.80 sec;   INR: 1.18 ratio         PTT - ( 2018 00:32 )  PTT:26.1 sec                                       Urinalysis Basic - ( 2018 16:20 )    Color: Orange / Appearance: Cloudy / S.025 / pH: x  Gluc: x / Ketone: Negative  / Bili: Moderate / Urobili: 1.0 mg/dL   Blood: x / Protein: Negative mg/dL / Nitrite: Negative   Leuk Esterase: Negative / RBC: 1-2 /HPF / WBC 1-2 /HPF   Sq Epi: x / Non Sq Epi: Occasional /HPF / Bacteria: Few /HPF                                                  LIVER FUNCTIONS - ( 2018 20:38 )  Alb: 1.9 g/dL / Pro: 3.2 g/dL / ALK PHOS: 622 U/L / ALT: 247 U/L / AST: 286 U/L / GGT: x                                                  Culture - Urine (collected 2018 05:41)  Source: .Urine Clean Catch (Midstream)  Preliminary Report (2018 08:25):    10,000 - 49,000 CFU/mL Escherichia coli                                                                                           X-Rays                                                                                     ECHO    MEDICATIONS  (STANDING):  dextrose 5%. 1000 milliLiter(s) (50 mL/Hr) IV Continuous <Continuous>  dextrose 50% Injectable 12.5 Gram(s) IV Push once  dextrose 50% Injectable 25 Gram(s) IV Push once  dextrose 50% Injectable 25 Gram(s) IV Push once  docusate sodium 100 milliGRAM(s) Oral daily  folic acid 1 milliGRAM(s) Oral daily  hydrOXYzine hydrochloride 25 milliGRAM(s) Oral four times a day  insulin glargine Injectable (LANTUS) 10 Unit(s) SubCutaneous at bedtime  insulin lispro Injectable (HumaLOG) 4 Unit(s) SubCutaneous before breakfast  insulin lispro Injectable (HumaLOG) 4 Unit(s) SubCutaneous before lunch  insulin lispro Injectable (HumaLOG) 4 Unit(s) SubCutaneous before dinner  meropenem  IVPB      meropenem  IVPB 1000 milliGRAM(s) IV Intermittent every 8 hours  pantoprazole    Tablet 40 milliGRAM(s) Oral before breakfast  phenylephrine    Infusion 0.25 MICROgram(s)/kG/Min (1.73 mL/Hr) IV Continuous <Continuous>  spironolactone 100 milliGRAM(s) Oral daily    MEDICATIONS  (PRN):  dextrose Gel 1 Dose(s) Oral once PRN Blood Glucose LESS THAN 70 milliGRAM(s)/deciliter  glucagon  Injectable 1 milliGRAM(s) IntraMuscular once PRN Glucose LESS THAN 70 milligrams/deciliter  LORazepam     Tablet 1 milliGRAM(s) Oral three times a day PRN Anxiety  ondansetron Injectable 4 milliGRAM(s) IV Push every 6 hours PRN Nausea and/or Vomiting
TOYA SERNA 52yFemalePatient is a 52y old  Female who presents with a chief complaint of profuse bleeding from paracentesis site (2018 06:59)      Patient has history of:  No Known Allergies          DKA (DIABETIC KETOACIDOSES); SEPSIS; ANEMIA  ^DKA (DIABETIC KETOACIDOSES); SEPSIS; ANEMIA  H/o or current diagnosis of HF- no contraindication to ACEI/ARBs  H/o or current diagnosis of HF- ACEI/ARB contraindication unknown  Yes  Family history of cirrhosis of liver (Mother, Grandparent)  No pertinent family history in first degree relatives  Handoff  MEWS Score  Psoriasis  HTN (hypertension)  CAD (coronary artery disease)  Diverticulitis  HTN (hypertension)  DM (diabetes mellitus)  Liver cirrhosis  Stented coronary artery  S/P CABG (coronary artery bypass graft)  CLL (chronic lymphocytic leukemia)  Hernia  DKA (diabetic ketoacidoses)  Liver cirrhosis  S/P cystoscopy  H/O colostomy  S/P CABG (coronary artery bypass graft)  History of colon resection  ABDOMINAL WOUND  30  Anemia  Sepsis        Patient treated with:  meropenem  IVPB      meropenem  IVPB 1000 milliGRAM(s) IV Intermittent every 8 hours        PHYSICAL EXAM  T(F): 97.8 (18 @ 04:06), Max: 98.6 (18 @ 11:45)  HR: 92 (18 @ 06:06) (92 - 116)  BP: 90/60 (18 @ 06:06) (73/46 - 104/55)  RR: 22 (18 @ 06:06) (14 - 32)  SpO2: 100% (18 @ 06:06) (98% - 100%)  Daily Height in cm: 132.08 (2018 11:45)    Daily Weight in k (2018 06:06)  HEENT: normal, no nuchal rigidity  Cor: RSR Nl S1 S2  Lungs: clear  Decreased breath sounds at bases    Abdomen: Nontender, Nl BS,     Ext: No clubbing,cyanosis or edema    LAB & RADIOLOGIC RESULTS:                        10.3   66.65 )-----------( 264      ( 2018 04:20 )             29.3         04-    118<LL>  |  88<L>  |  46<H>  ----------------------------<  152<H>  4.6   |  16<L>  |  1.1    Mg     1.5         TPro  3.2<L>  /  Alb  1.9<L>  /  TBili  9.4<H>  /  DBili  x   /  AST  286<H>  /  ALT  247<H>  /  AlkPhos  622<H>      <--<9>>3.9  <--<9>>9.4    Sodium, Serum: 118 mmol/L (18 @ 04:20)  Sodium, Serum: 122 mmol/L (18 @ 00:57)  Sodium, Serum: 125 mmol/L (18 @ 20:38)  Sodium, Serum: 125 mmol/L (18 @ 19:15)  Sodium, Serum: 126 mmol/L (18 @ 15:06)  Blood Gas Venous - Sodium: 121 mmoL/L (18 @ 10:16)  Sodium, Serum: 122 mmol/L (18 @ 10:10)    Hyponatremia              Lactic Acidosis   Blood Gas Venous - Lactate: 3.9 mmoL/L (18 @ 10:16)  Blood Gas Venous - Lactate: 9.4 mmoL/L (18 @ 05:28)        Acidosis         Creatinine, Serum: 1.1 mg/dL (18 @ 04:20)  eGFR if Non African American: 58 mL/min/1.73M2 (18 @ 04:20)  eGFR if African American: 67 mL/min/1.73M2 (18 @ 04:20)  Creatinine, Serum: 1.1 mg/dL (18 @ 00:57)  eGFR if Non African American: 58 mL/min/1.73M2 (18 @ 00:57)  eGFR if : 67 mL/min/1.73M2 (18 @ 00:57)  Creatinine, Serum: 1.1 mg/dL (18 @ 20:38)  eGFR if Non African American: 58 mL/min/1.73M2 (18 @ 20:38)  eGFR if : 67 mL/min/1.73M2 (18 @ 20:38)  Creatinine, Serum: 1.1 mg/dL (18 @ 19:15)  eGFR if Non African American: 58 mL/min/1.73M2 (18 @ 19:15)  eGFR if : 67 mL/min/1.73M2 (18 @ 19:15)  Creatinine, Serum: 1.0 mg/dL (18 @ 15:06)  eGFR if Non African American: 65 mL/min/1.73M2 (18 @ 15:06)  eGFR if : 76 mL/min/1.73M2 (18 @ 15:06)  Creatinine, Serum: 1.1 mg/dL (18 @ 10:10)  eGFR if Non African American: 58 mL/min/1.73M2 (18 @ 10:10)  eGFR if : 67 mL/min/1.73M2 (18 @ 10:10)      Urinalysis Basic - ( 2018 16:20 )    Color: Orange / Appearance: Cloudy / S.025 / pH: x  Gluc: x / Ketone: Negative  / Bili: Moderate / Urobili: 1.0 mg/dL   Blood: x / Protein: Negative mg/dL / Nitrite: Negative   Leuk Esterase: Negative / RBC: 1-2 /HPF / WBC 1-2 /HPF   Sq Epi: x / Non Sq Epi: Occasional /HPF / Bacteria: Few /HPF      PT/INR - ( 2018 00:32 )   PT: 12.80 sec;   INR: 1.18 ratio         PTT - ( 2018 00:32 )  PTT:26.1 sec    Culture - Urine (collected 2018 05:41)  Source: .Urine Clean Catch (Midstream)  Preliminary Report (2018 08:25):    10,000 - 49,000 CFU/mL Escherichia coli

## 2018-04-29 NOTE — PROGRESS NOTE ADULT - SUBJECTIVE AND OBJECTIVE BOX
Patient is a 52y old Female who presents with a chief complaint of profuse bleeding from paracentesis site (2018 06:59)    Currently admitted to medicine with the primary diagnosis of DKA (diabetic ketoacidoses)    Today is hospital day 1d.    BRIEF HOSPITAL COURSE: 53 yo F transfer from [facility] with PMHx of HTN, DM, CAD s/p CABG and stent (last stent 5 years ago), liver cirrhosis (idiopathic), CLL, ascites, s/p Hartmans and reversal for complicated diverticulitis and colovesicular fistula 3-4 years ago, has incisional hernia associated with previous parastomal site, large, nonobstructive, not surgical candidate, currently in hospice. Pt also has hepato-renal syndrome and abdominal skin maceration with 1 cm wound s/p closure.   Pt recently had leaking of ascites from the hernia for which surgery (Dr. Harris's team) had sutured her. After that the family wished it would be easier on the patient to not suture but to put a colostomy bag on the leaking site. She has been draining on avarage 2-3 liter every few days. Pt underwent a paracentesis on Monday, 18. Pt came to the ER yesterday with profuse bleeding from the abdomen .   Pt denies fever, chills, SOB, CP, abdominal pain, N/V/D.    - IN ED, pt found to have drop in H/H 9 -> 6.6, WBC 50 and in DKA.   - pt resended her hospice to get 2 unit of prbc transfusion.   - pt is s/p 2 L NS bolus, on insulin drip and  cc/hr    EVENTS LAST 24HRS: No overnight events    PAST MEDICAL & SURGICAL HISTORY  Psoriasis  HTN (hypertension)  CAD (coronary artery disease)  Diverticulitis  HTN (hypertension)  DM (diabetes mellitus)  Liver cirrhosis  Stented coronary artery  S/P CABG (coronary artery bypass graft)  CLL (chronic lymphocytic leukemia)  Hernia  S/P cystoscopy  H/O colostomy  S/P CABG (coronary artery bypass graft)  History of colon resection      SOCIAL HISTORY:      REVIEW OF SYSTEMS:  See HPI    ALLERGIES:    MEDICATIONS:  STANDING MEDICATIONS  dextrose 50% Injectable 12.5 Gram(s) IV Push once  dextrose 50% Injectable 25 Gram(s) IV Push once  dextrose 50% Injectable 25 Gram(s) IV Push once  docusate sodium 100 milliGRAM(s) Oral daily  folic acid 1 milliGRAM(s) Oral daily  hydrOXYzine hydrochloride 25 milliGRAM(s) Oral four times a day  insulin glargine Injectable (LANTUS) 10 Unit(s) SubCutaneous at bedtime  insulin lispro Injectable (HumaLOG) 4 Unit(s) SubCutaneous before breakfast  insulin lispro Injectable (HumaLOG) 4 Unit(s) SubCutaneous before lunch  insulin lispro Injectable (HumaLOG) 4 Unit(s) SubCutaneous before dinner  meropenem  IVPB      meropenem  IVPB 1000 milliGRAM(s) IV Intermittent every 8 hours  pantoprazole    Tablet 40 milliGRAM(s) Oral before breakfast  phenylephrine    Infusion 0.25 MICROgram(s)/kG/Min IV Continuous <Continuous>  spironolactone 100 milliGRAM(s) Oral daily    PRN MEDICATIONS  dextrose Gel 1 Dose(s) Oral once PRN  glucagon  Injectable 1 milliGRAM(s) IntraMuscular once PRN  LORazepam     Tablet 1 milliGRAM(s) Oral three times a day PRN  ondansetron Injectable 4 milliGRAM(s) IV Push every 6 hours PRN    VITALS:         ICU Vital Signs Last 24 Hrs:    T(C): 36.5 (2018 16:00), Max: 36.9 (2018 19:30)  T(F): 97.7 (2018 16:00), Max: 98.5 (2018 19:30)  HR: 69 (2018 14:00) (69 - 112)  BP: 104/63 (2018 16:00) (78/54 - 117/68)  BP(mean): 77 (2018 16:00) (56 - 85)  ABP: --  ABP(mean): --  RR: 18 (2018 16:00) (16 - 32)  SpO2: 99% (2018 16:00) (98% - 100%)          I&O's Detail:      2018 07:01  -  2018 07:00  --------------------------------------------------------  IN:    dextrose 5% + sodium chloride 0.45%.: 100 mL    insulin Infusion: 22 mL    IV PiggyBack: 300 mL    Packed Red Blood Cells: 580 mL    phenylephrine   Infusion: 24 mL    phenylephrine   Infusion: 4.5 mL    sodium chloride 0.45%: 1900 mL  Total IN: 2930.5 mL    OUT:    Voided: 800 mL  Total OUT: 800 mL    Total NET: 2130.5 mL      2018 07:01  -  2018 18:20  --------------------------------------------------------  IN:    IV PiggyBack: 100 mL    Oral Fluid: 420 mL    phenylephrine   Infusion: 37.6 mL  Total IN: 557.6 mL    OUT:    Voided: 900 mL  Total OUT: 900 mL    Total NET: -342.4 mL          LABS:                        10.3   66.65 )-----------( 264      ( 2018 04:20 )             29.3         122<L>  |  90<L>  |  45<H>  ----------------------------<  130<H>  4.7   |  16<L>  |  1.1    Ca    7.6<L>      2018 11:20  Phos  3.2       Mg     1.5         TPro  3.2<L>  /  Alb  1.9<L>  /  TBili  9.4<H>  /  DBili  x   /  AST  286<H>  /  ALT  247<H>  /  AlkPhos  622<H>            CAPILLARY BLOOD GLUCOSE  159 (2018 12:00)        PT/INR - ( 2018 00:32 )   PT: 12.80 sec;   INR: 1.18 ratio         PTT - ( 2018 00:32 )  PTT:26.1 sec  Urinalysis Basic - ( 2018 16:20 )    Color: Orange / Appearance: Cloudy / S.025 / pH: x  Gluc: x / Ketone: Negative  / Bili: Moderate / Urobili: 1.0 mg/dL   Blood: x / Protein: Negative mg/dL / Nitrite: Negative   Leuk Esterase: Negative / RBC: 1-2 /HPF / WBC 1-2 /HPF   Sq Epi: x / Non Sq Epi: Occasional /HPF / Bacteria: Few /HPF      CULTURES:  Culture Results:   10,000 - 49,000 CFU/mL Escherichia coli ( @ 05:41)    RADIOLOGY:         PHYSICAL EXAM    GENERAL: Frail. C achexic  	HEAD:  Atraumatic, Normocephalic  	CHEST/LUNG: Clear to auscultation bilaterally; No wheeze  	HEART: Regular rate and rhythm  	ABDOMEN: + abdominal binder dressing. + stitch on paracentesis drainage site  	EXTREMITIES:  no edema  NEUROLOGY: non-focal

## 2018-04-29 NOTE — CONSULT NOTE ADULT - ASSESSMENT
IMPRESSION  Sepsis (pulse>90 beats/min,  wbc>12,  decreased systolic bp, lactic acidosis) due to possible spontaneous bacterial peritonitis    Pt with  CLL, DKA, bleeding from paracentesis site, idiopathic cirrhosis, ascites, hyponatremia, lactic acidosis, systolic hypotension, wbc 66, normal platelet count    On: meropenem  1000 mg IV Intermittent every 8 hours for possible spontaneous bacterial peritonitis    Pt with E coli bactiuria     SUGGESTIONs  Await blood cultures  Continue Meropenem  Repeat sodium, white blood cell count  Transfuse rbc  Peritoneal fluid C&S
53 yo F transfer from [facility] with PMHx of HTN, DM, CAD s/p CABG and stent (last stent 5 years ago), liver cirrhosis (idiopathic), CLL, ascites presents for profuse bleeding from the paracentesis site s/p suturing by surgery at [facility].    # bleeding from paracentesis site and elevated WBC. h/o idiopathic cirrhosis and CLL  - WBC 50 likely multifactorial related to CLL and r/o intraabdominal infection   - Bleeding resolved s/p stitching  - f/u surgery  - will transfuse 2 unit pRBC  - monitor H/H  - palliative care c/s and consider hematology   - start meropenam 1000mg q8 and follow up with ID   - pan culture follow up  - f/u US abdomen    # DKA  - s/p 2 L NS bolus. c/w NS 75cc/ hr  - on insulin drip  - f/u acetone  - f/u BMP q2. monitor anion gap  - finger stick  - switch to D5 half NS and subcutaneuos insulin when gap is closed, Bicarb > 15 and FS < 200.     # HFrEF (EF20-25%)  - strict Is and Os  - monitor for signs of fluid overlead    # CAD s/p PCI (CABG, stent), HTN  - hold asa, hold bp medications    # Medication clarification  - pt takes Cipro three times a week. family unable to remember the reason, but mention Dr. Harris prescribed for a recent infection.  - clarify with Dr. Harris's team    # DVT ppx: SCD  - on protonix 40mg po     CODE status: DNR/DNI
53 yo F with ascites, bleading   from abdominal wound s/p bedside stiches x 2    Check labs ,   Resuscitate.   Correct Coagulopathy.   Admit to Hospice  Bacterial Peritonitis prophylaxis.

## 2018-04-30 LAB
-  AMIKACIN: SIGNIFICANT CHANGE UP
-  AMOXICILLIN/CLAVULANIC ACID: SIGNIFICANT CHANGE UP
-  AMPICILLIN/SULBACTAM: SIGNIFICANT CHANGE UP
-  AMPICILLIN: SIGNIFICANT CHANGE UP
-  AZTREONAM: SIGNIFICANT CHANGE UP
-  CEFAZOLIN: SIGNIFICANT CHANGE UP
-  CEFEPIME: SIGNIFICANT CHANGE UP
-  CEFOXITIN: SIGNIFICANT CHANGE UP
-  CEFTRIAXONE: SIGNIFICANT CHANGE UP
-  CIPROFLOXACIN: SIGNIFICANT CHANGE UP
-  ERTAPENEM: SIGNIFICANT CHANGE UP
-  GENTAMICIN: SIGNIFICANT CHANGE UP
-  IMIPENEM: SIGNIFICANT CHANGE UP
-  LEVOFLOXACIN: SIGNIFICANT CHANGE UP
-  MEROPENEM: SIGNIFICANT CHANGE UP
-  NITROFURANTOIN: SIGNIFICANT CHANGE UP
-  PIPERACILLIN/TAZOBACTAM: SIGNIFICANT CHANGE UP
-  TIGECYCLINE: SIGNIFICANT CHANGE UP
-  TOBRAMYCIN: SIGNIFICANT CHANGE UP
-  TRIMETHOPRIM/SULFAMETHOXAZOLE: SIGNIFICANT CHANGE UP
ANION GAP SERPL CALC-SCNC: 14 MMOL/L — SIGNIFICANT CHANGE UP (ref 7–14)
ANION GAP SERPL CALC-SCNC: 15 MMOL/L — HIGH (ref 7–14)
ANION GAP SERPL CALC-SCNC: 15 MMOL/L — HIGH (ref 7–14)
ANION GAP SERPL CALC-SCNC: 16 MMOL/L — HIGH (ref 7–14)
BASOPHILS # BLD AUTO: 0.13 K/UL — SIGNIFICANT CHANGE UP (ref 0–0.2)
BASOPHILS NFR BLD AUTO: 0.2 % — SIGNIFICANT CHANGE UP (ref 0–1)
BUN SERPL-MCNC: 41 MG/DL — HIGH (ref 10–20)
BUN SERPL-MCNC: 42 MG/DL — HIGH (ref 10–20)
BUN SERPL-MCNC: 43 MG/DL — HIGH (ref 10–20)
BUN SERPL-MCNC: 46 MG/DL — HIGH (ref 10–20)
CALCIUM SERPL-MCNC: 7.2 MG/DL — LOW (ref 8.5–10.1)
CALCIUM SERPL-MCNC: 7.3 MG/DL — LOW (ref 8.5–10.1)
CALCIUM SERPL-MCNC: 7.3 MG/DL — LOW (ref 8.5–10.1)
CALCIUM SERPL-MCNC: 7.9 MG/DL — LOW (ref 8.5–10.1)
CHLORIDE SERPL-SCNC: 90 MMOL/L — LOW (ref 98–110)
CHLORIDE SERPL-SCNC: 91 MMOL/L — LOW (ref 98–110)
CHLORIDE SERPL-SCNC: 92 MMOL/L — LOW (ref 98–110)
CHLORIDE SERPL-SCNC: 92 MMOL/L — LOW (ref 98–110)
CO2 SERPL-SCNC: 16 MMOL/L — LOW (ref 17–32)
CO2 SERPL-SCNC: 17 MMOL/L — SIGNIFICANT CHANGE UP (ref 17–32)
CREAT SERPL-MCNC: 0.9 MG/DL — SIGNIFICANT CHANGE UP (ref 0.7–1.5)
CREAT SERPL-MCNC: 1.1 MG/DL — SIGNIFICANT CHANGE UP (ref 0.7–1.5)
CULTURE RESULTS: SIGNIFICANT CHANGE UP
EOSINOPHIL # BLD AUTO: 1.07 K/UL — HIGH (ref 0–0.7)
EOSINOPHIL NFR BLD AUTO: 1.5 % — SIGNIFICANT CHANGE UP (ref 0–8)
GLUCOSE SERPL-MCNC: 143 MG/DL — HIGH (ref 70–99)
GLUCOSE SERPL-MCNC: 237 MG/DL — HIGH (ref 70–99)
GLUCOSE SERPL-MCNC: 280 MG/DL — HIGH (ref 70–99)
GLUCOSE SERPL-MCNC: 359 MG/DL — HIGH (ref 70–99)
HCT VFR BLD CALC: 29.4 % — LOW (ref 37–47)
HGB BLD-MCNC: 10.1 G/DL — LOW (ref 12–16)
IMM GRANULOCYTES NFR BLD AUTO: 1.6 % — HIGH (ref 0.1–0.3)
LYMPHOCYTES # BLD AUTO: 42.11 K/UL — HIGH (ref 1.2–3.4)
LYMPHOCYTES # BLD AUTO: 60.9 % — HIGH (ref 20.5–51.1)
MCHC RBC-ENTMCNC: 28.2 PG — SIGNIFICANT CHANGE UP (ref 27–31)
MCHC RBC-ENTMCNC: 34.4 G/DL — SIGNIFICANT CHANGE UP (ref 32–37)
MCV RBC AUTO: 82.1 FL — SIGNIFICANT CHANGE UP (ref 81–99)
METHOD TYPE: SIGNIFICANT CHANGE UP
MONOCYTES # BLD AUTO: 3.94 K/UL — HIGH (ref 0.1–0.6)
MONOCYTES NFR BLD AUTO: 5.7 % — SIGNIFICANT CHANGE UP (ref 1.7–9.3)
NEUTROPHILS # BLD AUTO: 20.77 K/UL — HIGH (ref 1.4–6.5)
NEUTROPHILS NFR BLD AUTO: 30.1 % — LOW (ref 42.2–75.2)
NRBC # BLD: 0 /100 WBCS — SIGNIFICANT CHANGE UP (ref 0–0)
ORGANISM # SPEC MICROSCOPIC CNT: SIGNIFICANT CHANGE UP
ORGANISM # SPEC MICROSCOPIC CNT: SIGNIFICANT CHANGE UP
OSMOLALITY SERPL: 272 MOS/KG — LOW (ref 289–308)
OSMOLALITY SERPL: 276 MOS/KG — LOW (ref 289–308)
OSMOLALITY UR: 607 MOS/KG — SIGNIFICANT CHANGE UP (ref 50–1400)
PLATELET # BLD AUTO: 344 K/UL — SIGNIFICANT CHANGE UP (ref 130–400)
POTASSIUM SERPL-MCNC: 3.9 MMOL/L — SIGNIFICANT CHANGE UP (ref 3.5–5)
POTASSIUM SERPL-MCNC: 4.3 MMOL/L — SIGNIFICANT CHANGE UP (ref 3.5–5)
POTASSIUM SERPL-MCNC: 4.4 MMOL/L — SIGNIFICANT CHANGE UP (ref 3.5–5)
POTASSIUM SERPL-MCNC: 4.6 MMOL/L — SIGNIFICANT CHANGE UP (ref 3.5–5)
POTASSIUM SERPL-SCNC: 3.9 MMOL/L — SIGNIFICANT CHANGE UP (ref 3.5–5)
POTASSIUM SERPL-SCNC: 4.3 MMOL/L — SIGNIFICANT CHANGE UP (ref 3.5–5)
POTASSIUM SERPL-SCNC: 4.4 MMOL/L — SIGNIFICANT CHANGE UP (ref 3.5–5)
POTASSIUM SERPL-SCNC: 4.6 MMOL/L — SIGNIFICANT CHANGE UP (ref 3.5–5)
POTASSIUM UR-SCNC: 40 MMOL/L — SIGNIFICANT CHANGE UP
RBC # BLD: 3.58 M/UL — LOW (ref 4.2–5.4)
RBC # FLD: 16 % — HIGH (ref 11.5–14.5)
SODIUM SERPL-SCNC: 121 MMOL/L — LOW (ref 135–146)
SODIUM SERPL-SCNC: 122 MMOL/L — LOW (ref 135–146)
SODIUM SERPL-SCNC: 123 MMOL/L — LOW (ref 135–146)
SODIUM SERPL-SCNC: 124 MMOL/L — LOW (ref 135–146)
SODIUM UR-SCNC: 20 MMOL/L — SIGNIFICANT CHANGE UP
SODIUM UR-SCNC: 20 MMOL/L — SIGNIFICANT CHANGE UP
SPECIMEN SOURCE: SIGNIFICANT CHANGE UP
WBC # BLD: 69.11 K/UL — CRITICAL HIGH (ref 4.8–10.8)
WBC # FLD AUTO: 69.11 K/UL — CRITICAL HIGH (ref 4.8–10.8)

## 2018-04-30 PROCEDURE — 99233 SBSQ HOSP IP/OBS HIGH 50: CPT

## 2018-04-30 RX ORDER — SODIUM CHLORIDE 9 MG/ML
1000 INJECTION, SOLUTION INTRAVENOUS
Qty: 0 | Refills: 0 | Status: DISCONTINUED | OUTPATIENT
Start: 2018-04-30 | End: 2018-05-02

## 2018-04-30 RX ORDER — INSULIN HUMAN 100 [IU]/ML
4 INJECTION, SOLUTION SUBCUTANEOUS
Qty: 100 | Refills: 0 | Status: DISCONTINUED | OUTPATIENT
Start: 2018-04-30 | End: 2018-04-30

## 2018-04-30 RX ORDER — INSULIN HUMAN 100 [IU]/ML
4 INJECTION, SOLUTION SUBCUTANEOUS
Qty: 100 | Refills: 0 | Status: DISCONTINUED | OUTPATIENT
Start: 2018-04-30 | End: 2018-05-01

## 2018-04-30 RX ORDER — DEXTROSE 50 % IN WATER 50 %
25 SYRINGE (ML) INTRAVENOUS
Qty: 0 | Refills: 0 | Status: DISCONTINUED | OUTPATIENT
Start: 2018-04-30 | End: 2018-05-01

## 2018-04-30 RX ORDER — MAGNESIUM SULFATE 500 MG/ML
2 VIAL (ML) INJECTION ONCE
Qty: 0 | Refills: 0 | Status: COMPLETED | OUTPATIENT
Start: 2018-04-30 | End: 2018-04-30

## 2018-04-30 RX ORDER — MIDODRINE HYDROCHLORIDE 2.5 MG/1
10 TABLET ORAL THREE TIMES A DAY
Qty: 0 | Refills: 0 | Status: DISCONTINUED | OUTPATIENT
Start: 2018-04-30 | End: 2018-05-04

## 2018-04-30 RX ORDER — SODIUM CHLORIDE 9 MG/ML
1000 INJECTION INTRAMUSCULAR; INTRAVENOUS; SUBCUTANEOUS
Qty: 0 | Refills: 0 | Status: DISCONTINUED | OUTPATIENT
Start: 2018-04-30 | End: 2018-04-30

## 2018-04-30 RX ORDER — SODIUM CHLORIDE 9 MG/ML
1000 INJECTION INTRAMUSCULAR; INTRAVENOUS; SUBCUTANEOUS ONCE
Qty: 0 | Refills: 0 | Status: COMPLETED | OUTPATIENT
Start: 2018-04-30 | End: 2018-04-30

## 2018-04-30 RX ORDER — FLUTICASONE PROPIONATE 50 MCG
1 SPRAY, SUSPENSION NASAL ONCE
Qty: 0 | Refills: 0 | Status: COMPLETED | OUTPATIENT
Start: 2018-04-30 | End: 2018-05-01

## 2018-04-30 RX ORDER — DEXTROSE 50 % IN WATER 50 %
50 SYRINGE (ML) INTRAVENOUS
Qty: 0 | Refills: 0 | Status: DISCONTINUED | OUTPATIENT
Start: 2018-04-30 | End: 2018-05-01

## 2018-04-30 RX ADMIN — MEROPENEM 100 MILLIGRAM(S): 1 INJECTION INTRAVENOUS at 22:57

## 2018-04-30 RX ADMIN — SODIUM CHLORIDE 1000 MILLILITER(S): 9 INJECTION INTRAMUSCULAR; INTRAVENOUS; SUBCUTANEOUS at 09:00

## 2018-04-30 RX ADMIN — Medication 25 MILLIGRAM(S): at 11:30

## 2018-04-30 RX ADMIN — Medication 4 UNIT(S): at 11:30

## 2018-04-30 RX ADMIN — SPIRONOLACTONE 100 MILLIGRAM(S): 25 TABLET, FILM COATED ORAL at 11:28

## 2018-04-30 RX ADMIN — PANTOPRAZOLE SODIUM 40 MILLIGRAM(S): 20 TABLET, DELAYED RELEASE ORAL at 10:48

## 2018-04-30 RX ADMIN — MEROPENEM 100 MILLIGRAM(S): 1 INJECTION INTRAVENOUS at 06:01

## 2018-04-30 RX ADMIN — Medication 25 MILLIGRAM(S): at 06:02

## 2018-04-30 RX ADMIN — SODIUM CHLORIDE 100 MILLILITER(S): 9 INJECTION INTRAMUSCULAR; INTRAVENOUS; SUBCUTANEOUS at 20:34

## 2018-04-30 RX ADMIN — Medication 4 UNIT(S): at 07:45

## 2018-04-30 RX ADMIN — Medication 25 MILLIGRAM(S): at 18:29

## 2018-04-30 RX ADMIN — MIDODRINE HYDROCHLORIDE 10 MILLIGRAM(S): 2.5 TABLET ORAL at 13:59

## 2018-04-30 RX ADMIN — Medication 50 GRAM(S): at 12:32

## 2018-04-30 RX ADMIN — Medication 100 MILLIGRAM(S): at 11:27

## 2018-04-30 RX ADMIN — Medication 1 MILLIGRAM(S): at 11:27

## 2018-04-30 RX ADMIN — MIDODRINE HYDROCHLORIDE 10 MILLIGRAM(S): 2.5 TABLET ORAL at 22:57

## 2018-04-30 RX ADMIN — MEROPENEM 100 MILLIGRAM(S): 1 INJECTION INTRAVENOUS at 13:59

## 2018-04-30 NOTE — PROGRESS NOTE ADULT - ASSESSMENT
53 [facility] st[facility] and packed. Transfused.    Plan:  no acute surgical intervention  continue current management - abdominal binder, abd pads - change as needed  call surgery if needed

## 2018-04-30 NOTE — PROGRESS NOTE ADULT - SUBJECTIVE AND OBJECTIVE BOX
Over Night Events:  Patient seen and examined. On neosynephrine 1.4      ROS:  See HPI    PHYSICAL EXAM    ICU Vital Signs Last 24 Hrs  T(C): 36.1 (2018 04:06), Max: 36.7 (2018 00:16)  T(F): 97 (2018 04:06), Max: 98.1 (2018 00:16)  HR: 68 (2018 06:43) (64 - 96)  BP: 95/55 (2018 06:43) (74/40 - 117/68)  BP(mean): 70 (2018 06:43) (49 - 86)  ABP: --  ABP(mean): --  RR: 16 (2018 06:43) (15 - 41)  SpO2: 100% (2018 21:15) (99% - 100%)      General:  HEENT:  WNL              Lymph Nodes: NO cervical LN   Lungs: Bilateral BS  Cardiovascular: Regular   Abdomen: Soft, Positive BS  Extremities: No clubbing   Skin:   Neurological: non focal     I&O's Detail    2018 07:01  -  2018 07:00  --------------------------------------------------------  IN:    IV PiggyBack: 100 mL    Oral Fluid: 520 mL    phenylephrine   Infusion: 147.4 mL  Total IN: 767.4 mL    OUT:    Voided: 1500 mL  Total OUT: 1500 mL    Total NET: -732.6 mL          LABS:                          10.1   69.11 )-----------( 344      ( 2018 04:25 )             29.4         2018 04:25    123    |  91     |  46     ----------------------------<  143    4.6     |  17     |  1.1      Ca    7.9        2018 04:25  Phos  3.2       2018 04:20  Mg     1.5       2018 04:20                                                                                      Urinalysis Basic - ( 2018 16:20 )    Color: Orange / Appearance: Cloudy / S.025 / pH: x  Gluc: x / Ketone: Negative  / Bili: Moderate / Urobili: 1.0 mg/dL   Blood: x / Protein: Negative mg/dL / Nitrite: Negative   Leuk Esterase: Negative / RBC: 1-2 /HPF / WBC 1-2 /HPF   Sq Epi: x / Non Sq Epi: Occasional /HPF / Bacteria: Few /HPF      Culture - Urine (collected 2018 16:01)  Source: .Urine Clean Catch (Midstream)  Final Report (2018 22:57):    No growth    Culture - Blood (collected 2018 11:02)  Source: .Blood Blood  Preliminary Report (2018 19:00):    No growth to date.    Culture - Urine (collected 2018 05:41)  Source: .Urine Clean Catch (Midstream)  Preliminary Report (2018 08:25):    10,000 - 49,000 CFU/mL Escherichia coli                                                                                           MEDICATIONS  (STANDING):  dextrose 50% Injectable 12.5 Gram(s) IV Push once  dextrose 50% Injectable 25 Gram(s) IV Push once  dextrose 50% Injectable 25 Gram(s) IV Push once  docusate sodium 100 milliGRAM(s) Oral daily  folic acid 1 milliGRAM(s) Oral daily  hydrOXYzine hydrochloride 25 milliGRAM(s) Oral four times a day  insulin glargine Injectable (LANTUS) 10 Unit(s) SubCutaneous at bedtime  insulin lispro Injectable (HumaLOG) 4 Unit(s) SubCutaneous before breakfast  insulin lispro Injectable (HumaLOG) 4 Unit(s) SubCutaneous before lunch  insulin lispro Injectable (HumaLOG) 4 Unit(s) SubCutaneous before dinner  meropenem  IVPB      meropenem  IVPB 1000 milliGRAM(s) IV Intermittent every 8 hours  pantoprazole    Tablet 40 milliGRAM(s) Oral before breakfast  phenylephrine    Infusion 0.25 MICROgram(s)/kG/Min (1.73 mL/Hr) IV Continuous <Continuous>  spironolactone 100 milliGRAM(s) Oral daily    MEDICATIONS  (PRN):  dextrose Gel 1 Dose(s) Oral once PRN Blood Glucose LESS THAN 70 milliGRAM(s)/deciliter  glucagon  Injectable 1 milliGRAM(s) IntraMuscular once PRN Glucose LESS THAN 70 milligrams/deciliter  LORazepam     Tablet 1 milliGRAM(s) Oral three times a day PRN Anxiety  ondansetron Injectable 4 milliGRAM(s) IV Push every 6 hours PRN Nausea and/or Vomiting          Xrays:  No evidence of cardiopulmonary disease

## 2018-04-30 NOTE — PROGRESS NOTE ADULT - ASSESSMENT
IMPRESSION: CLL on hospice, Cryptogenic Cirrhosis, DKA GAP elevated, Bicarbonate 17       PLAN:    CNS: no depressants    HEENT: Head of bed at 45    PULMONARY: Keep SpO2 at 92%    CARDIOVASCULAR: Bolus 1 litre NS, midodrine 10 mg Q 8 hr first dose stat. D5NS at 75cc/hr, Insulin gtt for now, replete magnesium, BMP at rounds    GI: GI prophylaxis.  Feeding, Oral diet    RENAL: F/u BMPs    INFECTIOUS DISEASE:    HEMATOLOGICAL:  DVT prophylaxis.    ENDOCRINE:  Follow up FS.  Insulin gtt,

## 2018-04-30 NOTE — PROGRESS NOTE ADULT - SUBJECTIVE AND OBJECTIVE BOX
Patient Summary:  Patient is a 52y old  Female who presents with a chief complaint of profuse bleeding from paracentesis site (2018 06:59)    HPI:  53 yo F transfer from [facility] with PMHx of HTN, DM, CAD s/p CABG and stent (last stent 5 years ago), liver cirrhosis (idiopathic), CLL, ascites, s/p Hartmans and reversal for complicated diverticulitis and colovesicular fistula 3-4 years ago, has incisional hernia associated with previous parastomal site, large, nonobstructive, not surgical candidate, currently in hospice. Pt also has hepato-renal syndrome and abdominal skin maceration with 1 cm wound s/p closure.   Pt recently had leaking of ascites from the hernia for which surgery (Dr. Harris's team) had sutured her. After that the family wished it would be easier on the patient to not suture but to put a colostomy bag on the leaking site. She has been draining on avarage 2-3 liter every few days. Pt underwent a paracentesis on Monday, 18. Pt came to the ER yesterday with profuse bleeding from the abdomen .   Pt denies fever, chills, SOB, CP, abdominal pain, N/V/D.    - IN ED, pt found to have drop in H/H 9 -> 6.6, WBC 50 and in DKA.   - pt resended her hospice to get 2 unit of prbc transfusion.   - pt is s/p 2 L NS bolus, on insulin drip and  cc/hr (2018 06:59)      HEALTH ISSUES - PROBLEM Dx:        OVERNIGHT/DAY EVENTS:  no overnight events    CAPILLARY BLOOD GLUCOSE  135 (2018 08:00)  175 (2018 21:00)  124 (2018 16:00)          VITAL SIGNS:  Vital Signs Last 24 Hrs  T(C): 36.1 (2018 04:06), Max: 36.7 (2018 00:16)  T(F): 97 (2018 04:06), Max: 98.1 (2018 00:16)  HR: 88 (2018 09:28) (64 - 96)  BP: 92/58 (2018 09:28) (74/40 - 113/63)  BP(mean): 70 (2018 09:28) (49 - 86)  RR: 17 (2018 09:28) (15 - 41)  SpO2: 98% (2018 08:45) (98% - 100%)       @ 07:01  -   @ 07:00  --------------------------------------------------------  IN: 767.4 mL / OUT: 1500 mL / NET: -732.6 mL        PHYSICAL EXAM:  General: NAD  HEENT: NC/AT; PERRL, clear conjunctiva  Neck: supple  Respiratory: CTA b/l  Cardiovascular: +S1/S2; RRR  Abdomen: drainage-yellow from abdomen site where colostomy bag once was.  no tendeness  Extremities:  no LE edema  Vascular: WWP, 2+ peripheral pulses b/l;  Skin: skin discoloration to abdomen- peeling  Neurological: A&Ox3, move all extremities. CN II-XII intact    LABS:                        10.1<L>  69.11<HH> )-----------( 344      (  @ 04:25 )             29.4<L>                          10.3<L>  66.65<HH> )-----------( 264      (  @ 04:20 )             29.3<L>      123<L>  |  91<L>  |  46<H>  ----------------------------<  143<H>   @ 04:25  4.6   |  17  |  1.1      121<L>  |  89<L>  |  47<H>  ----------------------------<  179<H>   @ 20:42  4.7   |  16<L>  |  1.1        Ca    7.9<L>   Ca    7.5<L>   Phos  3.2   Mg     1.5     TPro  3.2<L>  /  Alb  1.9<L>  /  TBili  9.4<H>  /  DBili  x   /  AST  286<H>  /  ALT  247<H>  /  AlkPhos  622<H>      Alb: 1.9 g/dL / Pro: 3.2 g/dL / ALK PHOS: 622 U/L / ALT: 247 U/L / AST: 286 U/L / GGT: x               Culture - Blood (collected 2018 04:20)  Source: .Blood None  Preliminary Report (2018 09:01):    No growth to date.    Culture - Urine (collected 2018 16:01)  Source: .Urine Clean Catch (Midstream)  Final Report (2018 22:57):    No growth    Culture - Blood (collected 2018 11:02)  Source: .Blood Blood  Preliminary Report (2018 19:00):    No growth to date.    Culture - Urine (collected 2018 05:41)  Source: .Urine Clean Catch (Midstream)  Final Report (2018 11:16):    10,000 - 49,000 CFU/mL Escherichia coli ESBL  Organism: Escherichia coli ESBL (2018 11:16)  Organism: Escherichia coli ESBL (2018 11:16)      Urinalysis Basic - ( 2018 16:20 )    Color: Orange / Appearance: Cloudy / S.025 / pH: x  Gluc: x / Ketone: Negative  / Bili: Moderate / Urobili: 1.0 mg/dL   Blood: x / Protein: Negative mg/dL / Nitrite: Negative   Leuk Esterase: Negative / RBC: 1-2 /HPF / WBC 1-2 /HPF   Sq Epi: x / Non Sq Epi: Occasional /HPF / Bacteria: Few /HPF            RADIOLOGY & ADDITIONAL TESTS:    MEDICATIONS  (STANDING):  dextrose 5% + sodium chloride 0.9%. 1000 milliLiter(s) (75 mL/Hr) IV Continuous <Continuous>  dextrose 50% Injectable 50 milliLiter(s) IV Push every 15 minutes  dextrose 50% Injectable 25 milliLiter(s) IV Push every 15 minutes  dextrose 50% Injectable 12.5 Gram(s) IV Push once  dextrose 50% Injectable 25 Gram(s) IV Push once  dextrose 50% Injectable 25 Gram(s) IV Push once  docusate sodium 100 milliGRAM(s) Oral daily  folic acid 1 milliGRAM(s) Oral daily  hydrOXYzine hydrochloride 25 milliGRAM(s) Oral four times a day  insulin glargine Injectable (LANTUS) 10 Unit(s) SubCutaneous at bedtime  insulin Infusion 4 Unit(s)/Hr (4 mL/Hr) IV Continuous <Continuous>  insulin lispro Injectable (HumaLOG) 4 Unit(s) SubCutaneous before breakfast  insulin lispro Injectable (HumaLOG) 4 Unit(s) SubCutaneous before lunch  insulin lispro Injectable (HumaLOG) 4 Unit(s) SubCutaneous before dinner  magnesium sulfate  IVPB 2 Gram(s) IV Intermittent once  meropenem  IVPB      meropenem  IVPB 1000 milliGRAM(s) IV Intermittent every 8 hours  midodrine 10 milliGRAM(s) Oral three times a day  pantoprazole    Tablet 40 milliGRAM(s) Oral before breakfast  phenylephrine    Infusion 0.25 MICROgram(s)/kG/Min (1.73 mL/Hr) IV Continuous <Continuous>  sodium chloride 0.9% Bolus 1000 milliLiter(s) IV Bolus once  spironolactone 100 milliGRAM(s) Oral daily    MEDICATIONS  (PRN):  dextrose Gel 1 Dose(s) Oral once PRN Blood Glucose LESS THAN 70 milliGRAM(s)/deciliter  glucagon  Injectable 1 milliGRAM(s) IntraMuscular once PRN Glucose LESS THAN 70 milligrams/deciliter  LORazepam     Tablet 1 milliGRAM(s) Oral three times a day PRN Anxiety  ondansetron Injectable 4 milliGRAM(s) IV Push every 6 hours PRN Nausea and/or Vomiting      PAST MEDICAL & SURGICAL HISTORY:  Psoriasis  HTN (hypertension)  CAD (coronary artery disease)  Diverticulitis  HTN (hypertension)  DM (diabetes mellitus)  Liver cirrhosis  Stented coronary artery  S/P CABG (coronary artery bypass graft)  CLL (chronic lymphocytic leukemia)  Hernia  S/P cystoscopy  H/O colostomy  S/P CABG (coronary artery bypass graft)  History of colon resection      Allergies    No Known Allergies        ASSESSMENT & PLAN:    CNS: A&Ox3    CARDIOVASCULAR:   Bolus 1 litre NS,   midodrine 10 mg Q 8 hr first dose stat.   D5NS at 75cc/hr,   Insulin gtt for now  BMP at [facility]    GI: GI prophylaxis.  Feeding, Oral diet    ENDOCRINE:  Follow up FS.  Insulin gtt,     RENAL: F/u BMPs    INFECTIOUS DISEASE:  Sepsis (pulse>90 beats/min,  wbc>12,  decreased systolic bp, lactic acidosis) due to possible spontaneous bacterial peritonitis    HEMATOLOGICAL:  DVT prophylaxis. Patient Summary:  Patient is a 52y old  Female who presents with a chief complaint of profuse bleeding from paracentesis site (2018 06:59)    HPI:  53 yo F transfer from [facility] with PMHx of HTN, DM, CAD s/p CABG and stent (last stent 5 years ago), liver cirrhosis (idiopathic), CLL, ascites, s/p Hartmans and reversal for complicated diverticulitis and colovesicular fistula 3-4 years ago, has incisional hernia associated with previous parastomal site, large, nonobstructive, not surgical candidate, currently in hospice. Pt also has hepato-renal syndrome and abdominal skin maceration with 1 cm wound s/p closure.   Pt recently had leaking of ascites from the hernia for which surgery (Dr. Harris's team) had sutured her. After that the family wished it would be easier on the patient to not suture but to put a colostomy bag on the leaking site. She has been draining on avarage 2-3 liter every few days. Pt underwent a paracentesis on Monday, 18. Pt came to the ER yesterday with profuse bleeding from the abdomen .   Pt denies fever, chills, SOB, CP, abdominal pain, N/V/D.    - IN ED, pt found to have drop in H/H 9 -> 6.6, WBC 50 and in DKA.   - pt resended her hospice to get 2 unit of prbc transfusion.   - pt is s/p 2 L NS bolus, on insulin drip and  cc/hr (2018 06:59)      HEALTH ISSUES - PROBLEM Dx:        OVERNIGHT/DAY EVENTS:  no overnight events    CAPILLARY BLOOD GLUCOSE  135 (2018 08:00)  175 (2018 21:00)  124 (2018 16:00)          VITAL SIGNS:  Vital Signs Last 24 Hrs  T(C): 36.1 (2018 04:06), Max: 36.7 (2018 00:16)  T(F): 97 (2018 04:06), Max: 98.1 (2018 00:16)  HR: 88 (2018 09:28) (64 - 96)  BP: 92/58 (2018 09:28) (74/40 - 113/63)  BP(mean): 70 (2018 09:28) (49 - 86)  RR: 17 (2018 09:28) (15 - 41)  SpO2: 98% (2018 08:45) (98% - 100%)       @ 07:01  -   @ 07:00  --------------------------------------------------------  IN: 767.4 mL / OUT: 1500 mL / NET: -732.6 mL        PHYSICAL EXAM:  General: NAD  HEENT: NC/AT; PERRL, clear conjunctiva  Neck: supple  Respiratory: CTA b/l  Cardiovascular: +S1/S2; RRR  Abdomen: drainage-yellow from abdomen site where colostomy bag once was.  no tendeness  Extremities:  no LE edema  Vascular: WWP, 2+ peripheral pulses b/l;  Skin: skin discoloration to abdomen- peeling  Neurological: A&Ox3, move all extremities. CN II-XII intact    LABS:                        10.1<L>  69.11<HH> )-----------( 344      (  @ 04:25 )             29.4<L>                          10.3<L>  66.65<HH> )-----------( 264      (  @ 04:20 )             29.3<L>      123<L>  |  91<L>  |  46<H>  ----------------------------<  143<H>   @ 04:25  4.6   |  17  |  1.1      121<L>  |  89<L>  |  47<H>  ----------------------------<  179<H>   @ 20:42  4.7   |  16<L>  |  1.1        Ca    7.9<L>   Ca    7.5<L>   Phos  3.2   Mg     1.5     TPro  3.2<L>  /  Alb  1.9<L>  /  TBili  9.4<H>  /  DBili  x   /  AST  286<H>  /  ALT  247<H>  /  AlkPhos  622<H>      Alb: 1.9 g/dL / Pro: 3.2 g/dL / ALK PHOS: 622 U/L / ALT: 247 U/L / AST: 286 U/L / GGT: x               Culture - Blood (collected 2018 04:20)  Source: .Blood None  Preliminary Report (2018 09:01):    No growth to date.    Culture - Urine (collected 2018 16:01)  Source: .Urine Clean Catch (Midstream)  Final Report (2018 22:57):    No growth    Culture - Blood (collected 2018 11:02)  Source: .Blood Blood  Preliminary Report (2018 19:00):    No growth to date.    Culture - Urine (collected 2018 05:41)  Source: .Urine Clean Catch (Midstream)  Final Report (2018 11:16):    10,000 - 49,000 CFU/mL Escherichia coli ESBL  Organism: Escherichia coli ESBL (2018 11:16)  Organism: Escherichia coli ESBL (2018 11:16)      Urinalysis Basic - ( 2018 16:20 )    Color: Orange / Appearance: Cloudy / S.025 / pH: x  Gluc: x / Ketone: Negative  / Bili: Moderate / Urobili: 1.0 mg/dL   Blood: x / Protein: Negative mg/dL / Nitrite: Negative   Leuk Esterase: Negative / RBC: 1-2 /HPF / WBC 1-2 /HPF   Sq Epi: x / Non Sq Epi: Occasional /HPF / Bacteria: Few /HPF            RADIOLOGY & ADDITIONAL TESTS:    MEDICATIONS  (STANDING):  dextrose 5% + sodium chloride 0.9%. 1000 milliLiter(s) (75 mL/Hr) IV Continuous <Continuous>  dextrose 50% Injectable 50 milliLiter(s) IV Push every 15 minutes  dextrose 50% Injectable 25 milliLiter(s) IV Push every 15 minutes  dextrose 50% Injectable 12.5 Gram(s) IV Push once  dextrose 50% Injectable 25 Gram(s) IV Push once  dextrose 50% Injectable 25 Gram(s) IV Push once  docusate sodium 100 milliGRAM(s) Oral daily  folic acid 1 milliGRAM(s) Oral daily  hydrOXYzine hydrochloride 25 milliGRAM(s) Oral four times a day  insulin glargine Injectable (LANTUS) 10 Unit(s) SubCutaneous at bedtime  insulin Infusion 4 Unit(s)/Hr (4 mL/Hr) IV Continuous <Continuous>  insulin lispro Injectable (HumaLOG) 4 Unit(s) SubCutaneous before breakfast  insulin lispro Injectable (HumaLOG) 4 Unit(s) SubCutaneous before lunch  insulin lispro Injectable (HumaLOG) 4 Unit(s) SubCutaneous before dinner  magnesium sulfate  IVPB 2 Gram(s) IV Intermittent once  meropenem  IVPB      meropenem  IVPB 1000 milliGRAM(s) IV Intermittent every 8 hours  midodrine 10 milliGRAM(s) Oral three times a day  pantoprazole    Tablet 40 milliGRAM(s) Oral before breakfast  phenylephrine    Infusion 0.25 MICROgram(s)/kG/Min (1.73 mL/Hr) IV Continuous <Continuous>  sodium chloride 0.9% Bolus 1000 milliLiter(s) IV Bolus once  spironolactone 100 milliGRAM(s) Oral daily    MEDICATIONS  (PRN):  dextrose Gel 1 Dose(s) Oral once PRN Blood Glucose LESS THAN 70 milliGRAM(s)/deciliter  glucagon  Injectable 1 milliGRAM(s) IntraMuscular once PRN Glucose LESS THAN 70 milligrams/deciliter  LORazepam     Tablet 1 milliGRAM(s) Oral three times a day PRN Anxiety  ondansetron Injectable 4 milliGRAM(s) IV Push every 6 hours PRN Nausea and/or Vomiting      PAST MEDICAL & SURGICAL HISTORY:  Psoriasis  HTN (hypertension)  CAD (coronary artery disease)  Diverticulitis  HTN (hypertension)  DM (diabetes mellitus)  Liver cirrhosis  Stented coronary artery  S/P CABG (coronary artery bypass graft)  CLL (chronic lymphocytic leukemia)  Hernia  S/P cystoscopy  H/O colostomy  S/P CABG (coronary artery bypass graft)  History of colon resection      Allergies    No Known Allergies        ASSESSMENT & PLAN:  CLL on hospice, Cryptogenic Cirrhosis, DKA GAP elevated, Bicarbonate 17     with acute blood loss    CNS: A&Ox3    CARDIOVASCULAR:   Bolus 1 litre NS,   midodrine 10 mg Q 8 hr first dose stat.   D5NS at 75cc/hr,   Insulin gtt for now  BMP at rounds    GI: GI prophylaxis.  Feeding, Oral diet    ENDOCRINE:  Follow up FS.  Insulin gtt,     RENAL: F/u BMPs    INFECTIOUS DISEASE:  Sepsis (pulse>90 beats/min,  wbc>12,  decreased systolic bp, lactic acidosis) due to possible spontaneous bacterial peritonitis    HEMATOLOGICAL:  DVT prophylaxis.

## 2018-04-30 NOTE — PROGRESS NOTE ADULT - SUBJECTIVE AND OBJECTIVE BOX
TOYA SERNA  52y Female   [id number]    Hospital Day: 3    Procedure/dx: LEAKAGE OF ASCITES AND BLEEDING  Events of the Last 24h:    Patient is a 52y old  Female who presents with a chief complaint of profuse bleeding from paracentesis site (2018 06:59)    PAST MEDICAL & SURGICAL HISTORY:  Psoriasis  HTN (hypertension)  CAD (coronary artery disease)  Diverticulitis  HTN (hypertension)  DM (diabetes mellitus)  Liver cirrhosis  Stented coronary artery  S/P CABG (coronary artery bypass graft)  CLL (chronic lymphocytic leukemia)  Hernia  S/P cystoscopy  H/O colostomy  S/P CABG (coronary artery bypass graft)  History of colon resection      Vital Signs Last 24 Hrs  T(C): 36.7 (2018 00:16), Max: 36.8 (2018 07:35)  T(F): 98.1 (2018 00:16), Max: 98.3 (2018 07:35)  HR: 68 (2018 03:06) (64 - 96)  BP: 100/62 (2018 03:06) (74/40 - 117/68)  BP(mean): 78 (2018 03:06) (49 - 86)  RR: 39 (2018 03:06) (15 - 41)  SpO2: 100% (2018 21:15) (99% - 100%)        Diet, Regular (18 @ 10:33)      I&O's Detail    2018 07:01  -  2018 05:12  --------------------------------------------------------  IN:    IV PiggyBack: 100 mL    Oral Fluid: 520 mL    phenylephrine   Infusion: 127.4 mL  Total IN: 747.4 mL    OUT:    Voided: 1300 mL  Total OUT: 1300 mL    Total NET: -552.6 mL          MEDICATIONS  (STANDING):  dextrose 50% Injectable 12.5 Gram(s) IV Push once  dextrose 50% Injectable 25 Gram(s) IV Push once  dextrose 50% Injectable 25 Gram(s) IV Push once  docusate sodium 100 milliGRAM(s) Oral daily  folic acid 1 milliGRAM(s) Oral daily  hydrOXYzine hydrochloride 25 milliGRAM(s) Oral four times a day  insulin glargine Injectable (LANTUS) 10 Unit(s) SubCutaneous at bedtime  insulin lispro Injectable (HumaLOG) 4 Unit(s) SubCutaneous before breakfast  insulin lispro Injectable (HumaLOG) 4 Unit(s) SubCutaneous before lunch  insulin lispro Injectable (HumaLOG) 4 Unit(s) SubCutaneous before dinner  meropenem  IVPB      meropenem  IVPB 1000 milliGRAM(s) IV Intermittent every 8 hours  pantoprazole    Tablet 40 milliGRAM(s) Oral before breakfast  phenylephrine    Infusion 0.25 MICROgram(s)/kG/Min (1.73 mL/Hr) IV Continuous <Continuous>  spironolactone 100 milliGRAM(s) Oral daily    MEDICATIONS  (PRN):  dextrose Gel 1 Dose(s) Oral once PRN Blood Glucose LESS THAN 70 milliGRAM(s)/deciliter  glucagon  Injectable 1 milliGRAM(s) IntraMuscular once PRN Glucose LESS THAN 70 milligrams/deciliter  LORazepam     Tablet 1 milliGRAM(s) Oral three times a day PRN Anxiety  ondansetron Injectable 4 milliGRAM(s) IV Push every 6 hours PRN Nausea and/or Vomiting      PHYSICAL EXAM:                          10.3   66.65 )-----------( 264      ( 2018 04:20 )             29.3            121<L>  |  89<L>  |  47<H>  ----------------------------<  179<H>  4.7   |  16<L>  |  1.1    Ca    7.5<L>      2018 20:42  Phos  3.2       Mg     1.5         TPro  3.2<L>  /  Alb  1.9<L>  /  TBili  9.4<H>  /  DBili  x   /  AST  286<H>  /  ALT  247<H>  /  AlkPhos  622<H>      LIVER FUNCTIONS - ( 2018 20:38 )  Alb: 1.9 g/dL / Pro: 3.2 g/dL / ALK PHOS: 622 U/L / ALT: 247 U/L / AST: 286 U/L / GGT: x                 Urinalysis Basic - ( 2018 16:20 )    Color: Orange / Appearance: Cloudy / S.025 / pH: x  Gluc: x / Ketone: Negative  / Bili: Moderate / Urobili: 1.0 mg/dL   Blood: x / Protein: Negative mg/dL / Nitrite: Negative   Leuk Esterase: Negative / RBC: 1-2 /HPF / WBC 1-2 /HPF   Sq Epi: x / Non Sq Epi: Occasional /HPF / Bacteria: Few /HPF        Culture - Urine (collected 2018 16:01)  Source: .Urine Clean Catch (Midstream)  Final Report (2018 22:57):    No growth    Culture - Blood (collected 2018 11:02)  Source: .Blood Blood  Preliminary Report (2018 19:00):    No growth to date.    Culture - Urine (collected 2018 05:41)  Source: .Urine Clean Catch (Midstream)  Preliminary Report (2018 08:25):    10,000 - 49,000 CFU/mL Escherichia coli        IMAGING: < from: Xray Chest 1 View- PORTABLE-Routine (18 @ 06:00) >  Status post a median sternotomy.    No radiographic evidence of acute cardiopulmonary disease. More   specifically, no evidence of vascular congestion.    < end of copied text >      SPECTRA: [id number] TOYA SERNA  52y Female   [id number]    Hospital Day: 3    Procedure/dx: LEAKAGE OF ASCITES AND BLEEDING  Events of the Last 24h: no acute events, ABD pads soaked with serous/ascitic fluid - changed in am    Patient is a 52y old  Female who presents with a chief complaint of profuse bleeding from paracentesis site (2018 06:59)    PAST MEDICAL & SURGICAL HISTORY:  Psoriasis  HTN (hypertension)  CAD (coronary artery disease)  Diverticulitis  HTN (hypertension)  DM (diabetes mellitus)  Liver cirrhosis  Stented coronary artery  S/P CABG (coronary artery bypass graft)  CLL (chronic lymphocytic leukemia)  Hernia  S/P cystoscopy  H/O colostomy  S/P CABG (coronary artery bypass graft)  History of colon resection      Vital Signs Last 24 Hrs  T(C): 36.7 (2018 00:16), Max: 36.8 (2018 07:35)  T(F): 98.1 (2018 00:16), Max: 98.3 (2018 07:35)  HR: 68 (2018 03:06) (64 - 96)  BP: 100/62 (2018 03:06) (74/40 - 117/68)  BP(mean): 78 (2018 03:06) (49 - 86)  RR: 39 (2018 03:06) (15 - 41)  SpO2: 100% (2018 21:15) (99% - 100%)        Diet, Regular (18 @ 10:33)      I&O's Detail    2018 07:01  -  2018 05:12  --------------------------------------------------------  IN:    IV PiggyBack: 100 mL    Oral Fluid: 520 mL    phenylephrine   Infusion: 127.4 mL  Total IN: 747.4 mL    OUT:    Voided: 1300 mL  Total OUT: 1300 mL    Total NET: -552.6 mL          MEDICATIONS  (STANDING):  dextrose 50% Injectable 12.5 Gram(s) IV Push once  dextrose 50% Injectable 25 Gram(s) IV Push once  dextrose 50% Injectable 25 Gram(s) IV Push once  docusate sodium 100 milliGRAM(s) Oral daily  folic acid 1 milliGRAM(s) Oral daily  hydrOXYzine hydrochloride 25 milliGRAM(s) Oral four times a day  insulin glargine Injectable (LANTUS) 10 Unit(s) SubCutaneous at bedtime  insulin lispro Injectable (HumaLOG) 4 Unit(s) SubCutaneous before breakfast  insulin lispro Injectable (HumaLOG) 4 Unit(s) SubCutaneous before lunch  insulin lispro Injectable (HumaLOG) 4 Unit(s) SubCutaneous before dinner  meropenem  IVPB      meropenem  IVPB 1000 milliGRAM(s) IV Intermittent every 8 hours  pantoprazole    Tablet 40 milliGRAM(s) Oral before breakfast  phenylephrine    Infusion 0.25 MICROgram(s)/kG/Min (1.73 mL/Hr) IV Continuous <Continuous>  spironolactone 100 milliGRAM(s) Oral daily    MEDICATIONS  (PRN):  dextrose Gel 1 Dose(s) Oral once PRN Blood Glucose LESS THAN 70 milliGRAM(s)/deciliter  glucagon  Injectable 1 milliGRAM(s) IntraMuscular once PRN Glucose LESS THAN 70 milligrams/deciliter  LORazepam     Tablet 1 milliGRAM(s) Oral three times a day PRN Anxiety  ondansetron Injectable 4 milliGRAM(s) IV Push every 6 hours PRN Nausea and/or Vomiting      PHYSICAL EXAM:  GEN: NAD  HEENT: WNL  RESP: CTAB  CV: RRR  ABD: SOFT, DISTENDED, WITH ABDOMINAL BINDER, SATURATED ABD PADS UNDERNEATH, NO ACTIVE BLEEDING                          10.3   66.65 )-----------( 264      ( 2018 04:20 )             29.3            121<L>  |  89<L>  |  47<H>  ----------------------------<  179<H>  4.7   |  16<L>  |  1.1    Ca    7.5<L>      2018 20:42  Phos  3.2       Mg     1.5         TPro  3.2<L>  /  Alb  1.9<L>  /  TBili  9.4<H>  /  DBili  x   /  AST  286<H>  /  ALT  247<H>  /  AlkPhos  622<H>      LIVER FUNCTIONS - ( 2018 20:38 )  Alb: 1.9 g/dL / Pro: 3.2 g/dL / ALK PHOS: 622 U/L / ALT: 247 U/L / AST: 286 U/L / GGT: x                 Urinalysis Basic - ( 2018 16:20 )    Color: Orange / Appearance: Cloudy / S.025 / pH: x  Gluc: x / Ketone: Negative  / Bili: Moderate / Urobili: 1.0 mg/dL   Blood: x / Protein: Negative mg/dL / Nitrite: Negative   Leuk Esterase: Negative / RBC: 1-2 /HPF / WBC 1-2 /HPF   Sq Epi: x / Non Sq Epi: Occasional /HPF / Bacteria: Few /HPF        Culture - Urine (collected 2018 16:01)  Source: .Urine Clean Catch (Midstream)  Final Report (2018 22:57):    No growth    Culture - Blood (collected 2018 11:02)  Source: .Blood Blood  Preliminary Report (2018 19:00):    No growth to date.    Culture - Urine (collected 2018 05:41)  Source: .Urine Clean Catch (Midstream)  Preliminary Report (2018 08:25):    10,000 - 49,000 CFU/mL Escherichia coli        IMAGING: < from: Xray Chest 1 View- PORTABLE-Routine (18 @ 06:00) >  Status post a median sternotomy.    No radiographic evidence of acute cardiopulmonary disease. More   specifically, no evidence of vascular congestion.    < end of copied text >      SPECTRA: [id number]

## 2018-05-01 LAB
ALBUMIN SERPL ELPH-MCNC: 1.7 G/DL — LOW (ref 3.5–5.2)
ALBUMIN SERPL ELPH-MCNC: 1.9 G/DL — LOW (ref 3.5–5.2)
ALBUMIN SERPL ELPH-MCNC: 1.9 G/DL — LOW (ref 3.5–5.2)
ALP SERPL-CCNC: 790 U/L — HIGH (ref 30–115)
ALP SERPL-CCNC: 877 U/L — HIGH (ref 30–115)
ALP SERPL-CCNC: 903 U/L — HIGH (ref 30–115)
ALT FLD-CCNC: 178 U/L — HIGH (ref 0–41)
ALT FLD-CCNC: 193 U/L — HIGH (ref 0–41)
ALT FLD-CCNC: 209 U/L — HIGH (ref 0–41)
ANION GAP SERPL CALC-SCNC: 11 MMOL/L — SIGNIFICANT CHANGE UP (ref 7–14)
ANION GAP SERPL CALC-SCNC: 12 MMOL/L — SIGNIFICANT CHANGE UP (ref 7–14)
ANION GAP SERPL CALC-SCNC: 14 MMOL/L — SIGNIFICANT CHANGE UP (ref 7–14)
ANISOCYTOSIS BLD QL: SLIGHT — SIGNIFICANT CHANGE UP
APTT BLD: 23.8 SEC — CRITICAL LOW (ref 27–39.2)
AST SERPL-CCNC: 100 U/L — HIGH (ref 0–41)
AST SERPL-CCNC: 110 U/L — HIGH (ref 0–41)
AST SERPL-CCNC: 122 U/L — HIGH (ref 0–41)
BASOPHILS # BLD AUTO: 0 K/UL — SIGNIFICANT CHANGE UP (ref 0–0.2)
BASOPHILS NFR BLD AUTO: 0 % — SIGNIFICANT CHANGE UP (ref 0–1)
BILIRUB SERPL-MCNC: 6.1 MG/DL — HIGH (ref 0.2–1.2)
BILIRUB SERPL-MCNC: 6.7 MG/DL — HIGH (ref 0.2–1.2)
BILIRUB SERPL-MCNC: 7.4 MG/DL — HIGH (ref 0.2–1.2)
BLD GP AB SCN SERPL QL: SIGNIFICANT CHANGE UP
BUN SERPL-MCNC: 27 MG/DL — HIGH (ref 10–20)
BUN SERPL-MCNC: 29 MG/DL — HIGH (ref 10–20)
BUN SERPL-MCNC: 30 MG/DL — HIGH (ref 10–20)
BUN SERPL-MCNC: 35 MG/DL — HIGH (ref 10–20)
BUN SERPL-MCNC: 38 MG/DL — HIGH (ref 10–20)
CALCIUM SERPL-MCNC: 6.9 MG/DL — LOW (ref 8.5–10.1)
CALCIUM SERPL-MCNC: 7 MG/DL — LOW (ref 8.5–10.1)
CALCIUM SERPL-MCNC: 7.3 MG/DL — LOW (ref 8.5–10.1)
CALCIUM UR-MCNC: 7 MG/DL — SIGNIFICANT CHANGE UP
CHLORIDE SERPL-SCNC: 94 MMOL/L — LOW (ref 98–110)
CHLORIDE SERPL-SCNC: 95 MMOL/L — LOW (ref 98–110)
CHLORIDE SERPL-SCNC: 96 MMOL/L — LOW (ref 98–110)
CK SERPL-CCNC: 34 U/L — SIGNIFICANT CHANGE UP (ref 0–225)
CO2 SERPL-SCNC: 15 MMOL/L — LOW (ref 17–32)
CO2 SERPL-SCNC: 16 MMOL/L — LOW (ref 17–32)
CO2 SERPL-SCNC: 16 MMOL/L — LOW (ref 17–32)
CO2 SERPL-SCNC: 17 MMOL/L — SIGNIFICANT CHANGE UP (ref 17–32)
CO2 SERPL-SCNC: 17 MMOL/L — SIGNIFICANT CHANGE UP (ref 17–32)
CREAT SERPL-MCNC: 0.7 MG/DL — SIGNIFICANT CHANGE UP (ref 0.7–1.5)
CREAT SERPL-MCNC: 0.8 MG/DL — SIGNIFICANT CHANGE UP (ref 0.7–1.5)
CREAT SERPL-MCNC: 0.8 MG/DL — SIGNIFICANT CHANGE UP (ref 0.7–1.5)
EOSINOPHIL # BLD AUTO: 2.49 K/UL — HIGH (ref 0–0.7)
EOSINOPHIL NFR BLD AUTO: 5.6 % — SIGNIFICANT CHANGE UP (ref 0–8)
GIANT PLATELETS BLD QL SMEAR: PRESENT — SIGNIFICANT CHANGE UP
GLUCOSE SERPL-MCNC: 102 MG/DL — HIGH (ref 70–99)
GLUCOSE SERPL-MCNC: 156 MG/DL — HIGH (ref 70–99)
GLUCOSE SERPL-MCNC: 188 MG/DL — HIGH (ref 70–99)
GLUCOSE SERPL-MCNC: 193 MG/DL — HIGH (ref 70–99)
GLUCOSE SERPL-MCNC: 210 MG/DL — HIGH (ref 70–99)
HCT VFR BLD CALC: 27.6 % — LOW (ref 37–47)
HGB BLD-MCNC: 9.5 G/DL — LOW (ref 12–16)
INR BLD: 1.05 RATIO — SIGNIFICANT CHANGE UP (ref 0.65–1.3)
LYMPHOCYTES # BLD AUTO: 3.46 K/UL — HIGH (ref 1.2–3.4)
LYMPHOCYTES # BLD AUTO: 7.8 % — LOW (ref 20.5–51.1)
LYMPHOCYTES # SPEC AUTO: 1.1 % — HIGH (ref 0–0)
MAGNESIUM SERPL-MCNC: 2.2 MG/DL — SIGNIFICANT CHANGE UP (ref 1.8–2.4)
MANUAL SMEAR VERIFICATION: SIGNIFICANT CHANGE UP
MCHC RBC-ENTMCNC: 28.5 PG — SIGNIFICANT CHANGE UP (ref 27–31)
MCHC RBC-ENTMCNC: 34.4 G/DL — SIGNIFICANT CHANGE UP (ref 32–37)
MCV RBC AUTO: 82.9 FL — SIGNIFICANT CHANGE UP (ref 81–99)
METAMYELOCYTES # FLD: 1.1 % — HIGH (ref 0–0)
MICROCYTES BLD QL: SLIGHT — SIGNIFICANT CHANGE UP
MONOCYTES # BLD AUTO: 2.97 K/UL — HIGH (ref 0.1–0.6)
MONOCYTES NFR BLD AUTO: 6.7 % — SIGNIFICANT CHANGE UP (ref 1.7–9.3)
MYELOCYTES NFR BLD: 1.1 % — HIGH (ref 0–0)
NEUTROPHILS # BLD AUTO: 33.51 K/UL — HIGH (ref 1.4–6.5)
NEUTROPHILS NFR BLD AUTO: 75.5 % — HIGH (ref 42.2–75.2)
NRBC # BLD: 0 /100 WBCS — SIGNIFICANT CHANGE UP (ref 0–0)
PHOSPHATE 24H UR-MCNC: 118 MG/DL — SIGNIFICANT CHANGE UP
PLAT MORPH BLD: NORMAL — SIGNIFICANT CHANGE UP
PLATELET # BLD AUTO: 218 K/UL — SIGNIFICANT CHANGE UP (ref 130–400)
POIKILOCYTOSIS BLD QL AUTO: SIGNIFICANT CHANGE UP
POLYCHROMASIA BLD QL SMEAR: SLIGHT — SIGNIFICANT CHANGE UP
POTASSIUM SERPL-MCNC: 4.1 MMOL/L — SIGNIFICANT CHANGE UP (ref 3.5–5)
POTASSIUM SERPL-MCNC: 4.2 MMOL/L — SIGNIFICANT CHANGE UP (ref 3.5–5)
POTASSIUM SERPL-MCNC: 4.3 MMOL/L — SIGNIFICANT CHANGE UP (ref 3.5–5)
POTASSIUM SERPL-MCNC: 4.4 MMOL/L — SIGNIFICANT CHANGE UP (ref 3.5–5)
POTASSIUM SERPL-MCNC: 4.7 MMOL/L — SIGNIFICANT CHANGE UP (ref 3.5–5)
POTASSIUM SERPL-SCNC: 4.1 MMOL/L — SIGNIFICANT CHANGE UP (ref 3.5–5)
POTASSIUM SERPL-SCNC: 4.2 MMOL/L — SIGNIFICANT CHANGE UP (ref 3.5–5)
POTASSIUM SERPL-SCNC: 4.3 MMOL/L — SIGNIFICANT CHANGE UP (ref 3.5–5)
POTASSIUM SERPL-SCNC: 4.4 MMOL/L — SIGNIFICANT CHANGE UP (ref 3.5–5)
POTASSIUM SERPL-SCNC: 4.7 MMOL/L — SIGNIFICANT CHANGE UP (ref 3.5–5)
PROT ?TM UR-MCNC: 13 MG/DLG/24H — SIGNIFICANT CHANGE UP
PROT SERPL-MCNC: 3.3 G/DL — LOW (ref 6–8)
PROT SERPL-MCNC: 3.3 G/DL — LOW (ref 6–8)
PROT SERPL-MCNC: 3.4 G/DL — LOW (ref 6–8)
PROTHROM AB SERPL-ACNC: 11.4 SEC — SIGNIFICANT CHANGE UP (ref 9.95–12.87)
RBC # BLD: 3.33 M/UL — LOW (ref 4.2–5.4)
RBC # FLD: 16.3 % — HIGH (ref 11.5–14.5)
RBC BLD AUTO: NORMAL — SIGNIFICANT CHANGE UP
SMUDGE CELLS # BLD: PRESENT — SIGNIFICANT CHANGE UP
SODIUM SERPL-SCNC: 122 MMOL/L — LOW (ref 135–146)
SODIUM SERPL-SCNC: 122 MMOL/L — LOW (ref 135–146)
SODIUM SERPL-SCNC: 123 MMOL/L — LOW (ref 135–146)
SODIUM SERPL-SCNC: 124 MMOL/L — LOW (ref 135–146)
SODIUM SERPL-SCNC: 126 MMOL/L — LOW (ref 135–146)
TROPONIN T SERPL-MCNC: 0.07 NG/ML — CRITICAL HIGH
TYPE + AB SCN PNL BLD: SIGNIFICANT CHANGE UP
URATE UR-MCNC: >88 MG/DL — SIGNIFICANT CHANGE UP
UUN UR-MCNC: 1070 MG/DL — SIGNIFICANT CHANGE UP
VARIANT LYMPHS # BLD: 1.1 % — SIGNIFICANT CHANGE UP (ref 0–5)
WBC # BLD: 44.38 K/UL — CRITICAL HIGH (ref 4.8–10.8)
WBC # FLD AUTO: 44.38 K/UL — CRITICAL HIGH (ref 4.8–10.8)

## 2018-05-01 PROCEDURE — 99233 SBSQ HOSP IP/OBS HIGH 50: CPT

## 2018-05-01 RX ORDER — FAMOTIDINE 10 MG/ML
20 INJECTION INTRAVENOUS ONCE
Qty: 0 | Refills: 0 | Status: COMPLETED | OUTPATIENT
Start: 2018-05-01 | End: 2018-05-01

## 2018-05-01 RX ORDER — INSULIN LISPRO 100/ML
5 VIAL (ML) SUBCUTANEOUS
Qty: 0 | Refills: 0 | Status: DISCONTINUED | OUTPATIENT
Start: 2018-05-01 | End: 2018-05-04

## 2018-05-01 RX ORDER — DEXTROSE 50 % IN WATER 50 %
1 SYRINGE (ML) INTRAVENOUS ONCE
Qty: 0 | Refills: 0 | Status: DISCONTINUED | OUTPATIENT
Start: 2018-05-01 | End: 2018-05-01

## 2018-05-01 RX ORDER — INSULIN GLARGINE 100 [IU]/ML
24 INJECTION, SOLUTION SUBCUTANEOUS EVERY MORNING
Qty: 0 | Refills: 0 | Status: DISCONTINUED | OUTPATIENT
Start: 2018-05-01 | End: 2018-05-01

## 2018-05-01 RX ORDER — INSULIN HUMAN 100 [IU]/ML
1 INJECTION, SOLUTION SUBCUTANEOUS
Qty: 100 | Refills: 0 | Status: DISCONTINUED | OUTPATIENT
Start: 2018-05-01 | End: 2018-05-01

## 2018-05-01 RX ORDER — GLUCAGON INJECTION, SOLUTION 0.5 MG/.1ML
1 INJECTION, SOLUTION SUBCUTANEOUS ONCE
Qty: 0 | Refills: 0 | Status: DISCONTINUED | OUTPATIENT
Start: 2018-05-01 | End: 2018-05-04

## 2018-05-01 RX ORDER — INSULIN LISPRO 100/ML
8 VIAL (ML) SUBCUTANEOUS
Qty: 0 | Refills: 0 | Status: DISCONTINUED | OUTPATIENT
Start: 2018-05-01 | End: 2018-05-01

## 2018-05-01 RX ORDER — INSULIN HUMAN 100 [IU]/ML
4 INJECTION, SOLUTION SUBCUTANEOUS
Qty: 50 | Refills: 0 | Status: DISCONTINUED | OUTPATIENT
Start: 2018-05-01 | End: 2018-05-01

## 2018-05-01 RX ORDER — INSULIN HUMAN 100 [IU]/ML
4 INJECTION, SOLUTION SUBCUTANEOUS
Qty: 100 | Refills: 0 | Status: DISCONTINUED | OUTPATIENT
Start: 2018-05-01 | End: 2018-05-01

## 2018-05-01 RX ORDER — DEXTROSE 50 % IN WATER 50 %
25 SYRINGE (ML) INTRAVENOUS ONCE
Qty: 0 | Refills: 0 | Status: DISCONTINUED | OUTPATIENT
Start: 2018-05-01 | End: 2018-05-01

## 2018-05-01 RX ORDER — INSULIN GLARGINE 100 [IU]/ML
15 INJECTION, SOLUTION SUBCUTANEOUS EVERY MORNING
Qty: 0 | Refills: 0 | Status: DISCONTINUED | OUTPATIENT
Start: 2018-05-01 | End: 2018-05-04

## 2018-05-01 RX ORDER — INSULIN LISPRO 100/ML
VIAL (ML) SUBCUTANEOUS
Qty: 0 | Refills: 0 | Status: DISCONTINUED | OUTPATIENT
Start: 2018-05-01 | End: 2018-05-01

## 2018-05-01 RX ORDER — SODIUM CHLORIDE 9 MG/ML
1000 INJECTION, SOLUTION INTRAVENOUS
Qty: 0 | Refills: 0 | Status: DISCONTINUED | OUTPATIENT
Start: 2018-05-01 | End: 2018-05-01

## 2018-05-01 RX ORDER — DEXTROSE 50 % IN WATER 50 %
1 SYRINGE (ML) INTRAVENOUS ONCE
Qty: 0 | Refills: 0 | Status: DISCONTINUED | OUTPATIENT
Start: 2018-05-01 | End: 2018-05-04

## 2018-05-01 RX ORDER — DEXTROSE 50 % IN WATER 50 %
25 SYRINGE (ML) INTRAVENOUS
Qty: 0 | Refills: 0 | Status: DISCONTINUED | OUTPATIENT
Start: 2018-05-01 | End: 2018-05-04

## 2018-05-01 RX ORDER — INSULIN LISPRO 100/ML
VIAL (ML) SUBCUTANEOUS
Qty: 0 | Refills: 0 | Status: DISCONTINUED | OUTPATIENT
Start: 2018-05-01 | End: 2018-05-04

## 2018-05-01 RX ORDER — DEXTROSE 50 % IN WATER 50 %
50 SYRINGE (ML) INTRAVENOUS
Qty: 0 | Refills: 0 | Status: DISCONTINUED | OUTPATIENT
Start: 2018-05-01 | End: 2018-05-01

## 2018-05-01 RX ORDER — SODIUM CHLORIDE 9 MG/ML
1000 INJECTION, SOLUTION INTRAVENOUS
Qty: 0 | Refills: 0 | Status: DISCONTINUED | OUTPATIENT
Start: 2018-05-01 | End: 2018-05-04

## 2018-05-01 RX ORDER — DEXTROSE 50 % IN WATER 50 %
12.5 SYRINGE (ML) INTRAVENOUS ONCE
Qty: 0 | Refills: 0 | Status: DISCONTINUED | OUTPATIENT
Start: 2018-05-01 | End: 2018-05-01

## 2018-05-01 RX ADMIN — MIDODRINE HYDROCHLORIDE 10 MILLIGRAM(S): 2.5 TABLET ORAL at 05:42

## 2018-05-01 RX ADMIN — Medication 1: at 08:28

## 2018-05-01 RX ADMIN — FAMOTIDINE 20 MILLIGRAM(S): 10 INJECTION INTRAVENOUS at 02:07

## 2018-05-01 RX ADMIN — Medication 8 UNIT(S): at 08:27

## 2018-05-01 RX ADMIN — Medication 100 MILLIGRAM(S): at 11:33

## 2018-05-01 RX ADMIN — PHENYLEPHRINE HYDROCHLORIDE 1.73 MICROGRAM(S)/KG/MIN: 10 INJECTION INTRAVENOUS at 11:04

## 2018-05-01 RX ADMIN — MIDODRINE HYDROCHLORIDE 10 MILLIGRAM(S): 2.5 TABLET ORAL at 21:36

## 2018-05-01 RX ADMIN — INSULIN HUMAN 4 UNIT(S)/HR: 100 INJECTION, SOLUTION SUBCUTANEOUS at 11:58

## 2018-05-01 RX ADMIN — MEROPENEM 100 MILLIGRAM(S): 1 INJECTION INTRAVENOUS at 05:42

## 2018-05-01 RX ADMIN — Medication 25 MILLIGRAM(S): at 05:41

## 2018-05-01 RX ADMIN — Medication 1 SPRAY(S): at 23:30

## 2018-05-01 RX ADMIN — Medication 25 MILLIGRAM(S): at 17:29

## 2018-05-01 RX ADMIN — MIDODRINE HYDROCHLORIDE 10 MILLIGRAM(S): 2.5 TABLET ORAL at 14:10

## 2018-05-01 RX ADMIN — PANTOPRAZOLE SODIUM 40 MILLIGRAM(S): 20 TABLET, DELAYED RELEASE ORAL at 08:26

## 2018-05-01 RX ADMIN — Medication 25 MILLIGRAM(S): at 23:34

## 2018-05-01 RX ADMIN — Medication 1 MILLIGRAM(S): at 11:33

## 2018-05-01 RX ADMIN — Medication 25 MILLIGRAM(S): at 11:33

## 2018-05-01 RX ADMIN — Medication 25 MILLIGRAM(S): at 00:01

## 2018-05-01 RX ADMIN — SODIUM CHLORIDE 75 MILLILITER(S): 9 INJECTION, SOLUTION INTRAVENOUS at 11:03

## 2018-05-01 RX ADMIN — MEROPENEM 100 MILLIGRAM(S): 1 INJECTION INTRAVENOUS at 21:34

## 2018-05-01 RX ADMIN — MEROPENEM 100 MILLIGRAM(S): 1 INJECTION INTRAVENOUS at 14:10

## 2018-05-01 RX ADMIN — INSULIN GLARGINE 24 UNIT(S): 100 INJECTION, SOLUTION SUBCUTANEOUS at 08:26

## 2018-05-01 NOTE — PROGRESS NOTE ADULT - SUBJECTIVE AND OBJECTIVE BOX
Over Night Events: on insulin drip         ROS:  See HPI    PHYSICAL EXAM    ICU Vital Signs Last 24 Hrs  T(C): 35.9 (01 May 2018 08:00), Max: 36.9 (30 Apr 2018 16:00)  T(F): 96.7 (01 May 2018 08:00), Max: 98.5 (30 Apr 2018 16:00)  HR: 72 (01 May 2018 10:00) (66 - 96)  BP: 98/52 (01 May 2018 10:00) (92/54 - 117/74)  BP(mean): 67 (01 May 2018 10:00) (66 - 94)  RR: 22 (01 May 2018 10:00) (15 - 35)  SpO2: 99% (01 May 2018 08:00) (99% - 99%)        04-30-18 @ 07:01  -  05-01-18 @ 07:00  --------------------------------------------------------  IN:    dextrose 5% + sodium chloride 0.9%.: 1125 mL    insulin Infusion: 1 mL    insulin Infusion: 46 mL    IV PiggyBack: 100 mL    Oral Fluid: 300 mL    Other: 1400 mL    phenylephrine   Infusion: 201 mL    sodium chloride 0.9%: 600 mL    Sodium Chloride 0.9% IV Bolus: 1000 mL  Total IN: 4773 mL    OUT:    Other: 975 mL    Voided: 425 mL  Total OUT: 1400 mL    Total NET: 3373 mL      05-01-18 @ 07:01 - 05-01-18 @ 10:50  --------------------------------------------------------  IN:    dextrose 5% + sodium chloride 0.9%.: 300 mL    insulin Infusion: 1 mL    Oral Fluid: 180 mL    Other: 670 mL    phenylephrine   Infusion: 18 mL  Total IN: 1169 mL    OUT:    Voided: 150 mL  Total OUT: 150 mL    Total NET: 1019 mL          General:  HEENT:  PERRLA         Lymph Nodes: NO cervical LN   Lungs: Bilateral BS  Cardiovascular: Regular   Abdomen: Soft, Positive BS  Extremities: No clubbing   Skin: INTACT  Neurological: MOVES ALL EXT      LABS:                          9.5    44.38 )-----------( 218      ( 01 May 2018 04:42 )             27.6                                               05-01    126<L>  |  95<L>  |  35<H>  ----------------------------<  156<H>  4.2   |  17  |  0.7    Ca    7.3<L>      01 May 2018 04:42  Mg     2.2     05-01    TPro  3.4<L>  /  Alb  1.9<L>  /  TBili  6.7<H>  /  DBili  x   /  AST  122<H>  /  ALT  209<H>  /  AlkPhos  790<H>  05-01      PT/INR - ( 01 May 2018 04:42 )   PT: 11.40 sec;   INR: 1.05 ratio         PTT - ( 01 May 2018 04:42 )  PTT:23.8 sec                                           CARDIAC MARKERS ( 01 May 2018 04:42 )  x     / 0.07 ng/mL / 34 U/L / x     / x                                                LIVER FUNCTIONS - ( 01 May 2018 04:42 )  Alb: 1.9 g/dL / Pro: 3.4 g/dL / ALK PHOS: 790 U/L / ALT: 209 U/L / AST: 122 U/L / GGT: x                                                  Culture - Blood (collected 29 Apr 2018 04:20)  Source: .Blood None  Preliminary Report (30 Apr 2018 09:01):    No growth to date.    Culture - Urine (collected 28 Apr 2018 16:01)  Source: .Urine Clean Catch (Midstream)  Final Report (29 Apr 2018 22:57):    No growth    Culture - Blood (collected 28 Apr 2018 11:02)  Source: .Blood Blood  Preliminary Report (29 Apr 2018 19:00):    No growth to date.                                                                                             MEDICATIONS  (STANDING):  dextrose 5% + sodium chloride 0.9%. 1000 milliLiter(s) (75 mL/Hr) IV Continuous <Continuous>  dextrose 5%. 1000 milliLiter(s) (50 mL/Hr) IV Continuous <Continuous>  dextrose 50% Injectable 12.5 Gram(s) IV Push once  dextrose 50% Injectable 25 Gram(s) IV Push once  dextrose 50% Injectable 25 Gram(s) IV Push once  dextrose 50% Injectable 50 milliLiter(s) IV Push every 15 minutes  dextrose 50% Injectable 25 milliLiter(s) IV Push every 15 minutes  dextrose 50% Injectable 12.5 Gram(s) IV Push once  dextrose 50% Injectable 25 Gram(s) IV Push once  dextrose 50% Injectable 25 Gram(s) IV Push once  docusate sodium 100 milliGRAM(s) Oral daily  fluticasone propionate 50 MICROgram(s)/spray Nasal Spray 1 Spray(s) Both Nostrils once  folic acid 1 milliGRAM(s) Oral daily  hydrOXYzine hydrochloride 25 milliGRAM(s) Oral four times a day  insulin glargine Injectable (LANTUS) 24 Unit(s) SubCutaneous every morning  insulin Infusion 1 Unit(s)/Hr (1 mL/Hr) IV Continuous <Continuous>  insulin lispro (HumaLOG) corrective regimen sliding scale   SubCutaneous three times a day before meals  insulin lispro Injectable (HumaLOG) 8 Unit(s) SubCutaneous three times a day before meals  meropenem  IVPB      meropenem  IVPB 1000 milliGRAM(s) IV Intermittent every 8 hours  midodrine 10 milliGRAM(s) Oral three times a day  pantoprazole    Tablet 40 milliGRAM(s) Oral before breakfast  phenylephrine    Infusion 0.25 MICROgram(s)/kG/Min (1.73 mL/Hr) IV Continuous <Continuous>  spironolactone 100 milliGRAM(s) Oral daily    MEDICATIONS  (PRN):  dextrose Gel 1 Dose(s) Oral once PRN Blood Glucose LESS THAN 70 milliGRAM(s)/deciliter  dextrose Gel 1 Dose(s) Oral once PRN Blood Glucose LESS THAN 70 milliGRAM(s)/deciliter  glucagon  Injectable 1 milliGRAM(s) IntraMuscular once PRN Glucose LESS THAN 70 milligrams/deciliter  glucagon  Injectable 1 milliGRAM(s) IntraMuscular once PRN Glucose LESS THAN 70 milligrams/deciliter  LORazepam     Tablet 1 milliGRAM(s) Oral three times a day PRN Anxiety  ondansetron Injectable 4 milliGRAM(s) IV Push every 6 hours PRN Nausea and/or Vomiting      Xrays:                                                                                     ECHO

## 2018-05-01 NOTE — PROGRESS NOTE ADULT - SUBJECTIVE AND OBJECTIVE BOX
Patient Summary:  Patient is a 52y old  Female who presents with a chief complaint of profuse bleeding from paracentesis site (28 Apr 2018 06:59)    HPI:  53 yo F transfer from [facility] with PMHx of HTN, DM, CAD s/p CABG and stent (last stent 5 years ago), liver cirrhosis (idiopathic), CLL, ascites, s/p Hartmans and reversal for complicated diverticulitis and colovesicular fistula 3-4 years ago, has incisional hernia associated with previous parastomal site, large, nonobstructive, not surgical candidate, currently in hospice. Pt also has hepato-renal syndrome and abdominal skin maceration with 1 cm wound s/p closure.   Pt recently had leaking of ascites from the hernia for which surgery (Dr. Harris's team) had sutured her. After that the family wished it would be easier on the patient to not suture but to put a colostomy bag on the leaking site. She has been draining on avarage 2-3 liter every few days. Pt underwent a paracentesis on Monday, 4/23/18. Pt came to the ER yesterday with profuse bleeding from the abdomen .   Pt denies fever, chills, SOB, CP, abdominal pain, N/V/D.    - IN ED, pt found to have drop in H/H 9 -> 6.6, WBC 50 and in DKA.   - pt resended her hospice to get 2 unit of prbc transfusion.   - pt is s/p 2 L NS bolus, on insulin drip and  cc/hr (28 Apr 2018 06:59)      HEALTH ISSUES - PROBLEM Dx:        OVERNIGHT/DAY EVENTS:    CAPILLARY BLOOD GLUCOSE  155 (01 May 2018 14:00)  179 (01 May 2018 13:00)  232 (01 May 2018 12:00)  267 (01 May 2018 10:00)  186 (01 May 2018 08:00)  179 (01 May 2018 06:00)  174 (01 May 2018 04:00)  124 (01 May 2018 02:00)  90 (01 May 2018 00:00)  92 (30 Apr 2018 22:00)  176 (30 Apr 2018 20:00)  246 (30 Apr 2018 17:00)          VITAL SIGNS:  Vital Signs Last 24 Hrs  T(C): 36.2 (01 May 2018 12:00), Max: 36.9 (30 Apr 2018 16:00)  T(F): 97.1 (01 May 2018 12:00), Max: 98.5 (30 Apr 2018 16:00)  HR: 80 (01 May 2018 14:00) (66 - 102)  BP: 92/59 (01 May 2018 14:00) (80/64 - 117/74)  BP(mean): 74 (01 May 2018 14:00) (66 - 94)  RR: 18 (01 May 2018 14:00) (15 - 35)  SpO2: 100% (01 May 2018 12:00) (99% - 100%)      04-30 @ 07:01  -  05-01 @ 07:00  --------------------------------------------------------  IN: 4773 mL / OUT: 1400 mL / NET: 3373 mL    05-01 @ 07:01  -  05-01 @ 15:50  --------------------------------------------------------  IN: 1883 mL / OUT: 150 mL / NET: 1733 mL        PHYSICAL EXAM:  General: NAD  HEENT: NC/AT; PERRL, clear conjunctiva  Neck: supple  Respiratory: CTA b/l  Cardiovascular: +S1/S2; RRR  Abdomen: soft, NT/ND; +BS x4  Extremities:  no LE edema  Vascular: WWP, 2+ peripheral pulses b/l;  Skin: normal color and turgor; no rash  Neurological: A&Ox3, move all extremities. CN II-XII intact    LABS:                        9.5<L>  44.38<HH> )-----------( 218      ( 05-01 @ 04:42 )             27.6<L>                          10.1<L>  69.11<HH> )-----------( 344      ( 04-30 @ 04:25 )             29.4<L>      122<L>  |  95<L>  |  30<H>  ----------------------------<  210<H>  05-01 @ 13:36  4.3   |  15<L>  |  0.7      126<L>  |  95<L>  |  35<H>  ----------------------------<  156<H>  05-01 @ 04:42  4.2   |  17  |  0.7        Ca    6.9<L>   Ca    7.3<L>   Mg     2.2     TPro  3.3<L>  /  Alb  1.9<L>  /  TBili  7.4<H>  /  DBili  x   /  AST  110<H>  /  ALT  193<H>  /  AlkPhos  877<H>    TPro  3.4<L>  /  Alb  1.9<L>  /  TBili  6.7<H>  /  DBili  x   /  AST  122<H>  /  ALT  209<H>  /  AlkPhos  790<H>      Alb: 1.9 g/dL / Pro: 3.3 g/dL / ALK PHOS: 877 U/L / ALT: 193 U/L / AST: 110 U/L / GGT: x           PT: 11.40 sec;   INR: 1.05 ratio;  PTT: 23.8 sec         Culture - Blood (collected 29 Apr 2018 04:20)  Source: .Blood None  Preliminary Report (30 Apr 2018 09:01):    No growth to date.    Culture - Urine (collected 28 Apr 2018 16:01)  Source: .Urine Clean Catch (Midstream)  Final Report (29 Apr 2018 22:57):    No growth        CARDIAC MARKERS ( 01 May 2018 04:42 )  x     / 0.07 ng/mL / 34 U/L / x     / x            RADIOLOGY & ADDITIONAL TESTS:    MEDICATIONS  (STANDING):  dextrose 5% + sodium chloride 0.9%. 1000 milliLiter(s) (75 mL/Hr) IV Continuous <Continuous>  dextrose 5% + sodium chloride 0.9%. 1000 milliLiter(s) (100 mL/Hr) IV Continuous <Continuous>  dextrose 50% Injectable 25 milliLiter(s) IV Push every 15 minutes  dextrose 50% Injectable 25 Gram(s) IV Push once  docusate sodium 100 milliGRAM(s) Oral daily  fluticasone propionate 50 MICROgram(s)/spray Nasal Spray 1 Spray(s) Both Nostrils once  folic acid 1 milliGRAM(s) Oral daily  hydrOXYzine hydrochloride 25 milliGRAM(s) Oral four times a day  insulin Infusion 4 Unit(s)/Hr (4 mL/Hr) IV Continuous <Continuous>  meropenem  IVPB      meropenem  IVPB 1000 milliGRAM(s) IV Intermittent every 8 hours  midodrine 10 milliGRAM(s) Oral three times a day  pantoprazole    Tablet 40 milliGRAM(s) Oral before breakfast  phenylephrine    Infusion 0.25 MICROgram(s)/kG/Min (1.73 mL/Hr) IV Continuous <Continuous>  spironolactone 100 milliGRAM(s) Oral daily    MEDICATIONS  (PRN):  glucagon  Injectable 1 milliGRAM(s) IntraMuscular once PRN Glucose LESS THAN 70 milligrams/deciliter  glucagon  Injectable 1 milliGRAM(s) IntraMuscular once PRN Glucose LESS THAN 70 milligrams/deciliter  LORazepam     Tablet 1 milliGRAM(s) Oral three times a day PRN Anxiety  ondansetron Injectable 4 milliGRAM(s) IV Push every 6 hours PRN Nausea and/or Vomiting      PAST MEDICAL & SURGICAL HISTORY:  Psoriasis  HTN (hypertension)  CAD (coronary artery disease)  Diverticulitis  HTN (hypertension)  DM (diabetes mellitus)  Liver cirrhosis  Stented coronary artery  S/P CABG (coronary artery bypass graft)  CLL (chronic lymphocytic leukemia)  Hernia  S/P cystoscopy  H/O colostomy  S/P CABG (coronary artery bypass graft)  History of colon resection      Allergies    No Known Allergies        ASSESSMENT & PLAN:    Neuro:      CVS:      Pulm:      GI:      /Renal:      Heme:      Endo:      ID:      Other: Patient Summary:  Patient is a 52y old  Female who presents with a chief complaint of profuse bleeding from paracentesis site (28 Apr 2018 06:59)    HPI:  51 yo F transfer from [facility] with PMHx of HTN, DM, CAD s/p CABG and stent (last stent 5 years ago), liver cirrhosis (idiopathic), CLL, ascites, s/p Hartmans and reversal for complicated diverticulitis and colovesicular fistula 3-4 years ago, has incisional hernia associated with previous parastomal site, large, nonobstructive, not surgical candidate, currently in hospice. Pt also has hepato-renal syndrome and abdominal skin maceration with 1 cm wound s/p closure.   Pt recently had leaking of ascites from the hernia for which surgery (Dr. Harris's team) had sutured her. After that the family wished it would be easier on the patient to not suture but to put a colostomy bag on the leaking site. She has been draining on avarage 2-3 liter every few days. Pt underwent a paracentesis on Monday, 4/23/18. Pt came to the ER yesterday with profuse bleeding from the abdomen .   Pt denies fever, chills, SOB, CP, abdominal pain, N/V/D.    - IN ED, pt found to have drop in H/H 9 -> 6.6, WBC 50 and in DKA.   - pt resended her hospice to get 2 unit of prbc transfusion.   - pt is s/p 2 L NS bolus, on insulin drip and  cc/hr (28 Apr 2018 06:59)      HEALTH ISSUES - PROBLEM Dx:        OVERNIGHT/DAY EVENTS:    CAPILLARY BLOOD GLUCOSE  155 (01 May 2018 14:00)  179 (01 May 2018 13:00)  232 (01 May 2018 12:00)  267 (01 May 2018 10:00)  186 (01 May 2018 08:00)  179 (01 May 2018 06:00)  174 (01 May 2018 04:00)  124 (01 May 2018 02:00)  90 (01 May 2018 00:00)  92 (30 Apr 2018 22:00)  176 (30 Apr 2018 20:00)  246 (30 Apr 2018 17:00)          VITAL SIGNS:  Vital Signs Last 24 Hrs  T(C): 36.2 (01 May 2018 12:00), Max: 36.9 (30 Apr 2018 16:00)  T(F): 97.1 (01 May 2018 12:00), Max: 98.5 (30 Apr 2018 16:00)  HR: 80 (01 May 2018 14:00) (66 - 102)  BP: 92/59 (01 May 2018 14:00) (80/64 - 117/74)  BP(mean): 74 (01 May 2018 14:00) (66 - 94)  RR: 18 (01 May 2018 14:00) (15 - 35)  SpO2: 100% (01 May 2018 12:00) (99% - 100%)      04-30 @ 07:01  -  05-01 @ 07:00  --------------------------------------------------------  IN: 4773 mL / OUT: 1400 mL / NET: 3373 mL    05-01 @ 07:01  -  05-01 @ 15:50  --------------------------------------------------------  IN: 1883 mL / OUT: 150 mL / NET: 1733 mL        PHYSICAL EXAM:  General: NAD  HEENT: NC/AT; PERRL, clear conjunctiva  Neck: supple  Respiratory: CTA b/l  Cardiovascular: +S1/S2; RRR  Abdomen: soft, NT/ND; +BS x4  Extremities:  no LE edema  Vascular: WWP, 2+ peripheral pulses b/l;  Skin: normal color and turgor; no rash  Neurological: A&Ox3, move all extremities. CN II-XII intact    LABS:                        9.5<L>  44.38<HH> )-----------( 218      ( 05-01 @ 04:42 )             27.6<L>                          10.1<L>  69.11<HH> )-----------( 344      ( 04-30 @ 04:25 )             29.4<L>      122<L>  |  95<L>  |  30<H>  ----------------------------<  210<H>  05-01 @ 13:36  4.3   |  15<L>  |  0.7      126<L>  |  95<L>  |  35<H>  ----------------------------<  156<H>  05-01 @ 04:42  4.2   |  17  |  0.7        Ca    6.9<L>   Ca    7.3<L>   Mg     2.2     TPro  3.3<L>  /  Alb  1.9<L>  /  TBili  7.4<H>  /  DBili  x   /  AST  110<H>  /  ALT  193<H>  /  AlkPhos  877<H>    TPro  3.4<L>  /  Alb  1.9<L>  /  TBili  6.7<H>  /  DBili  x   /  AST  122<H>  /  ALT  209<H>  /  AlkPhos  790<H>      Alb: 1.9 g/dL / Pro: 3.3 g/dL / ALK PHOS: 877 U/L / ALT: 193 U/L / AST: 110 U/L / GGT: x           PT: 11.40 sec;   INR: 1.05 ratio;  PTT: 23.8 sec         Culture - Blood (collected 29 Apr 2018 04:20)  Source: .Blood None  Preliminary Report (30 Apr 2018 09:01):    No growth to date.    Culture - Urine (collected 28 Apr 2018 16:01)  Source: .Urine Clean Catch (Midstream)  Final Report (29 Apr 2018 22:57):    No growth        CARDIAC MARKERS ( 01 May 2018 04:42 )  x     / 0.07 ng/mL / 34 U/L / x     / x            RADIOLOGY & ADDITIONAL TESTS:    MEDICATIONS  (STANDING):  dextrose 5% + sodium chloride 0.9%. 1000 milliLiter(s) (75 mL/Hr) IV Continuous <Continuous>  dextrose 5% + sodium chloride 0.9%. 1000 milliLiter(s) (100 mL/Hr) IV Continuous <Continuous>  dextrose 50% Injectable 25 milliLiter(s) IV Push every 15 minutes  dextrose 50% Injectable 25 Gram(s) IV Push once  docusate sodium 100 milliGRAM(s) Oral daily  fluticasone propionate 50 MICROgram(s)/spray Nasal Spray 1 Spray(s) Both Nostrils once  folic acid 1 milliGRAM(s) Oral daily  hydrOXYzine hydrochloride 25 milliGRAM(s) Oral four times a day  insulin Infusion 4 Unit(s)/Hr (4 mL/Hr) IV Continuous <Continuous>  meropenem  IVPB      meropenem  IVPB 1000 milliGRAM(s) IV Intermittent every 8 hours  midodrine 10 milliGRAM(s) Oral three times a day  pantoprazole    Tablet 40 milliGRAM(s) Oral before breakfast  phenylephrine    Infusion 0.25 MICROgram(s)/kG/Min (1.73 mL/Hr) IV Continuous <Continuous>  spironolactone 100 milliGRAM(s) Oral daily    MEDICATIONS  (PRN):  glucagon  Injectable 1 milliGRAM(s) IntraMuscular once PRN Glucose LESS THAN 70 milligrams/deciliter  glucagon  Injectable 1 milliGRAM(s) IntraMuscular once PRN Glucose LESS THAN 70 milligrams/deciliter  LORazepam     Tablet 1 milliGRAM(s) Oral three times a day PRN Anxiety  ondansetron Injectable 4 milliGRAM(s) IV Push every 6 hours PRN Nausea and/or Vomiting      PAST MEDICAL & SURGICAL HISTORY:  Psoriasis  HTN (hypertension)  CAD (coronary artery disease)  Diverticulitis  HTN (hypertension)  DM (diabetes mellitus)  Liver cirrhosis  Stented coronary artery  S/P CABG (coronary artery bypass graft)  CLL (chronic lymphocytic leukemia)  Hernia  S/P cystoscopy  H/O colostomy  S/P CABG (coronary artery bypass graft)  History of colon resection      Allergies    No Known Allergies        ASSESSMENT & PLAN:    CLL on hospice, Cryptogenic Cirrhosis, DKA GAP elevated, Bicarbonate 17     with acute blood loss under control now    CNS: A&Ox3    CARDIOVASCULAR:   Bolus 1 litre NS,   midodrine 10 mg Q 8 hr first dose stat.   D5NS at 75cc/hr,   Insulin gtt for now  BMP at [facility] patients anion gap still not closed.  contine insulin drip and fluids as needed    GI: GI prophylaxis.  Feeding, Oral diet    ENDOCRINE:  Follow up FS.  Insulin gtt,     RENAL: F/u BMPs    INFECTIOUS DISEASE:  Sepsis (pulse>90 beats/min,  wbc>12,  decreased systolic bp, lactic acidosis) due to possible spontaneous bacterial peritonitis    HEMATOLOGICAL:  DVT prophylaxis.

## 2018-05-01 NOTE — PROGRESS NOTE ADULT - ASSESSMENT
IMPRESSION: CLL on hospice, Cryptogenic Cirrhosis, DKA GAP elevated, Bicarbonate 17       PLAN:    CNS: no depressants    HEENT: Head of bed at 45    PULMONARY: Keep SpO2 at 92%    CARDIOVASCULAR: Bolus 1 litre NS, midodrine 10 mg Q 8 hr first dose stat. D5NS at 75cc/hr, Insulin gtt for now, replete magnesium, BMP at rounds    GI: GI prophylaxis.  Feeding, Oral diet    RENAL: F/u BMPs    INFECTIOUS DISEASE:    HEMATOLOGICAL:  DVT prophylaxis.    ENDOCRINE:  Follow up FS.  Insulin gtt,     reestablish the goals of care with hospice

## 2018-05-02 LAB
ALBUMIN SERPL ELPH-MCNC: 1.8 G/DL — LOW (ref 3.5–5.2)
ALP SERPL-CCNC: 971 U/L — HIGH (ref 30–115)
ALT FLD-CCNC: 176 U/L — HIGH (ref 0–41)
ANION GAP SERPL CALC-SCNC: 12 MMOL/L — SIGNIFICANT CHANGE UP (ref 7–14)
APTT BLD: 24.5 SEC — LOW (ref 27–39.2)
AST SERPL-CCNC: 94 U/L — HIGH (ref 0–41)
B-OH-BUTYR SERPL-SCNC: 0 MMOL/L — SIGNIFICANT CHANGE UP
BASOPHILS # BLD AUTO: 0.07 K/UL — SIGNIFICANT CHANGE UP (ref 0–0.2)
BASOPHILS NFR BLD AUTO: 0.3 % — SIGNIFICANT CHANGE UP (ref 0–1)
BILIRUB SERPL-MCNC: 7.2 MG/DL — HIGH (ref 0.2–1.2)
BUN SERPL-MCNC: 25 MG/DL — HIGH (ref 10–20)
CALCIUM SERPL-MCNC: 7.3 MG/DL — LOW (ref 8.5–10.1)
CHLORIDE SERPL-SCNC: 101 MMOL/L — SIGNIFICANT CHANGE UP (ref 98–110)
CK SERPL-CCNC: 33 U/L — SIGNIFICANT CHANGE UP (ref 0–225)
CO2 SERPL-SCNC: 16 MMOL/L — LOW (ref 17–32)
CREAT SERPL-MCNC: 0.6 MG/DL — LOW (ref 0.7–1.5)
EOSINOPHIL # BLD AUTO: 0.74 K/UL — HIGH (ref 0–0.7)
EOSINOPHIL NFR BLD AUTO: 3.4 % — SIGNIFICANT CHANGE UP (ref 0–8)
GLUCOSE SERPL-MCNC: 133 MG/DL — HIGH (ref 70–99)
HCT VFR BLD CALC: 26.9 % — LOW (ref 37–47)
HGB BLD-MCNC: 8.9 G/DL — LOW (ref 12–16)
IMM GRANULOCYTES NFR BLD AUTO: 1.7 % — HIGH (ref 0.1–0.3)
INR BLD: 1.1 RATIO — SIGNIFICANT CHANGE UP (ref 0.65–1.3)
LYMPHOCYTES # BLD AUTO: 11.62 K/UL — HIGH (ref 1.2–3.4)
LYMPHOCYTES # BLD AUTO: 53.4 % — HIGH (ref 20.5–51.1)
MANUAL DIF COMMENT BLD-IMP: SIGNIFICANT CHANGE UP
MCHC RBC-ENTMCNC: 27.9 PG — SIGNIFICANT CHANGE UP (ref 27–31)
MCHC RBC-ENTMCNC: 33.1 G/DL — SIGNIFICANT CHANGE UP (ref 32–37)
MCV RBC AUTO: 84.3 FL — SIGNIFICANT CHANGE UP (ref 81–99)
MONOCYTES # BLD AUTO: 1.48 K/UL — HIGH (ref 0.1–0.6)
MONOCYTES NFR BLD AUTO: 6.8 % — SIGNIFICANT CHANGE UP (ref 1.7–9.3)
NEUTROPHILS # BLD AUTO: 7.47 K/UL — HIGH (ref 1.4–6.5)
NEUTROPHILS NFR BLD AUTO: 34.4 % — LOW (ref 42.2–75.2)
NRBC # BLD: 0 /100 WBCS — SIGNIFICANT CHANGE UP (ref 0–0)
PLATELET # BLD AUTO: 131 K/UL — SIGNIFICANT CHANGE UP (ref 130–400)
POTASSIUM SERPL-MCNC: 4.4 MMOL/L — SIGNIFICANT CHANGE UP (ref 3.5–5)
POTASSIUM SERPL-SCNC: 4.4 MMOL/L — SIGNIFICANT CHANGE UP (ref 3.5–5)
PROT SERPL-MCNC: 3.5 G/DL — LOW (ref 6–8)
PROTHROM AB SERPL-ACNC: 11.9 SEC — SIGNIFICANT CHANGE UP (ref 9.95–12.87)
RBC # BLD: 3.19 M/UL — LOW (ref 4.2–5.4)
RBC # FLD: 16.5 % — HIGH (ref 11.5–14.5)
SODIUM SERPL-SCNC: 129 MMOL/L — LOW (ref 135–146)
TROPONIN T SERPL-MCNC: 0.06 NG/ML — CRITICAL HIGH
WBC # BLD: 21.74 K/UL — HIGH (ref 4.8–10.8)
WBC # FLD AUTO: 21.74 K/UL — HIGH (ref 4.8–10.8)

## 2018-05-02 PROCEDURE — 99233 SBSQ HOSP IP/OBS HIGH 50: CPT

## 2018-05-02 RX ADMIN — Medication 100 MILLIGRAM(S): at 16:47

## 2018-05-02 RX ADMIN — SODIUM CHLORIDE 75 MILLILITER(S): 9 INJECTION, SOLUTION INTRAVENOUS at 06:20

## 2018-05-02 RX ADMIN — MIDODRINE HYDROCHLORIDE 10 MILLIGRAM(S): 2.5 TABLET ORAL at 23:15

## 2018-05-02 RX ADMIN — MIDODRINE HYDROCHLORIDE 10 MILLIGRAM(S): 2.5 TABLET ORAL at 14:14

## 2018-05-02 RX ADMIN — Medication 25 MILLIGRAM(S): at 23:17

## 2018-05-02 RX ADMIN — Medication 1 MILLIGRAM(S): at 16:47

## 2018-05-02 RX ADMIN — Medication 4: at 13:08

## 2018-05-02 RX ADMIN — Medication 25 MILLIGRAM(S): at 05:45

## 2018-05-02 RX ADMIN — SPIRONOLACTONE 100 MILLIGRAM(S): 25 TABLET, FILM COATED ORAL at 05:48

## 2018-05-02 RX ADMIN — MIDODRINE HYDROCHLORIDE 10 MILLIGRAM(S): 2.5 TABLET ORAL at 05:46

## 2018-05-02 RX ADMIN — Medication 25 MILLIGRAM(S): at 16:47

## 2018-05-02 RX ADMIN — MEROPENEM 100 MILLIGRAM(S): 1 INJECTION INTRAVENOUS at 05:45

## 2018-05-02 RX ADMIN — PANTOPRAZOLE SODIUM 40 MILLIGRAM(S): 20 TABLET, DELAYED RELEASE ORAL at 06:15

## 2018-05-02 RX ADMIN — Medication 5 UNIT(S): at 18:04

## 2018-05-02 RX ADMIN — Medication 5 UNIT(S): at 09:09

## 2018-05-02 RX ADMIN — Medication 4: at 17:55

## 2018-05-02 RX ADMIN — Medication 5 UNIT(S): at 13:06

## 2018-05-02 RX ADMIN — MEROPENEM 100 MILLIGRAM(S): 1 INJECTION INTRAVENOUS at 14:14

## 2018-05-02 NOTE — PROGRESS NOTE ADULT - ASSESSMENT
IMPRESSION: CLL on hospice, Cryptogenic Cirrhosis, DKA GAP elevated, Bicarbonate 17       PLAN:    CNS: no depressants    HEENT: Head of bed at 45    PULMONARY: Keep SpO2 at 92%    CARDIOVASCULAR: , midodrine 10 mg Q 8 hr first dose stat. D5NS at 75cc/hr,     GI: GI prophylaxis.  Feeding, Oral diet    RENAL: F/u BMPs    INFECTIOUS DISEASE:    HEMATOLOGICAL:  DVT prophylaxis.    ENDOCRINE:  Follow up FS.  Insulin gtt,     gaols d/w sister at length yesterday  doesnt want aggressive care but not sure about hospice    downgrade to floor and d/c TLC

## 2018-05-02 NOTE — PROGRESS NOTE ADULT - ASSESSMENT
CLL on hospice, Cryptogenic Cirrhosis, DKA GAP elevated, Bicarbonate 17     with acute blood loss under control now  H/H stable    Hospice: patient and sister plan to go back to hospice care    CARDIOVASCULAR:   patient off pressors maintaining BP.      GI: GI prophylaxis.  Feeding, Oral diet    ENDOCRINE:  Follow up FS.     RENAL: F/u BMPs    INFECTIOUS DISEASE:  Sepsis (pulse>90 beats/min,  wbc>12,  decreased systolic bp, lactic acidosis) due to possible spontaneous bacterial peritonitis---pt still on meropenem    HEMATOLOGICAL:  DVT prophylaxis.

## 2018-05-02 NOTE — PROGRESS NOTE ADULT - SUBJECTIVE AND OBJECTIVE BOX
Over Night Events:  off insulin drip, off pressors   overall better       ROS:  See HPI    PHYSICAL EXAM    ICU Vital Signs Last 24 Hrs  T(C): 36.3 (02 May 2018 08:00), Max: 36.5 (01 May 2018 20:00)  T(F): 97.3 (02 May 2018 08:00), Max: 97.7 (01 May 2018 20:00)  HR: 87 (02 May 2018 08:00) (70 - 102)  BP: 124/61 (02 May 2018 08:00) (80/64 - 124/61)  BP(mean): 84 (02 May 2018 08:00) (66 - 96)  RR: 15 (02 May 2018 08:00) (15 - 26)  SpO2: 98% (02 May 2018 08:00) (98% - 100%)        05-01-18 @ 07:01  -  05-02-18 @ 07:00  --------------------------------------------------------  IN:    dextrose 5% + sodium chloride 0.9%.: 1650 mL    insulin Infusion: 1 mL    insulin Infusion: 16 mL    IV PiggyBack: 50 mL    Oral Fluid: 860 mL    Other: 2945 mL    phenylephrine   Infusion: 39 mL  Total IN: 5561 mL    OUT:    Voided: 1050 mL  Total OUT: 1050 mL    Total NET: 4511 mL          General:  HEENT:  PERRLA         Lymph Nodes: NO cervical LN   Lungs: Bilateral BS  Cardiovascular: Regular   Abdomen: Soft, Positive BS, wound abdomen from paracentesis   Extremities: No clubbing   Skin: erosions   Neurological: MOVES ALL EXT      LABS:                          8.9    21.74 )-----------( 131      ( 02 May 2018 04:42 )             26.9                                               05-02    129<L>  |  101  |  25<H>  ----------------------------<  133<H>  4.4   |  16<L>  |  0.6<L>    Ca    7.3<L>      02 May 2018 04:42  Mg     2.2     05-01    TPro  3.5<L>  /  Alb  1.8<L>  /  TBili  7.2<H>  /  DBili  x   /  AST  94<H>  /  ALT  176<H>  /  AlkPhos  971<H>  05-02      PT/INR - ( 02 May 2018 04:42 )   PT: 11.90 sec;   INR: 1.10 ratio         PTT - ( 02 May 2018 04:42 )  PTT:24.5 sec                                           CARDIAC MARKERS ( 02 May 2018 04:42 )  x     / 0.06 ng/mL / 33 U/L / x     / x      CARDIAC MARKERS ( 01 May 2018 04:42 )  x     / 0.07 ng/mL / 34 U/L / x     / x                                                LIVER FUNCTIONS - ( 02 May 2018 04:42 )  Alb: 1.8 g/dL / Pro: 3.5 g/dL / ALK PHOS: 971 U/L / ALT: 176 U/L / AST: 94 U/L / GGT: x                                                                                                                                         MEDICATIONS  (STANDING):  dextrose 5% + sodium chloride 0.9%. 1000 milliLiter(s) (75 mL/Hr) IV Continuous <Continuous>  dextrose 5%. 1000 milliLiter(s) (50 mL/Hr) IV Continuous <Continuous>  dextrose 50% Injectable 25 milliLiter(s) IV Push every 15 minutes  dextrose 50% Injectable 25 Gram(s) IV Push once  docusate sodium 100 milliGRAM(s) Oral daily  folic acid 1 milliGRAM(s) Oral daily  hydrOXYzine hydrochloride 25 milliGRAM(s) Oral four times a day  insulin glargine Injectable (LANTUS) 15 Unit(s) SubCutaneous every morning  insulin lispro (HumaLOG) corrective regimen sliding scale   SubCutaneous three times a day before meals  insulin lispro Injectable (HumaLOG) 5 Unit(s) SubCutaneous before breakfast  insulin lispro Injectable (HumaLOG) 5 Unit(s) SubCutaneous before lunch  insulin lispro Injectable (HumaLOG) 5 Unit(s) SubCutaneous before dinner  meropenem  IVPB      meropenem  IVPB 1000 milliGRAM(s) IV Intermittent every 8 hours  midodrine 10 milliGRAM(s) Oral three times a day  pantoprazole    Tablet 40 milliGRAM(s) Oral before breakfast  phenylephrine    Infusion 0.25 MICROgram(s)/kG/Min (1.73 mL/Hr) IV Continuous <Continuous>  spironolactone 100 milliGRAM(s) Oral daily    MEDICATIONS  (PRN):  dextrose Gel 1 Dose(s) Oral once PRN Blood Glucose LESS THAN 70 milliGRAM(s)/deciliter  glucagon  Injectable 1 milliGRAM(s) IntraMuscular once PRN Glucose LESS THAN 70 milligrams/deciliter  glucagon  Injectable 1 milliGRAM(s) IntraMuscular once PRN Glucose LESS THAN 70 milligrams/deciliter  LORazepam     Tablet 1 milliGRAM(s) Oral three times a day PRN Anxiety  ondansetron Injectable 4 milliGRAM(s) IV Push every 6 hours PRN Nausea and/or Vomiting      Xrays:                                                                                     ECHO

## 2018-05-02 NOTE — PROGRESS NOTE ADULT - SUBJECTIVE AND OBJECTIVE BOX
ICU DOWNGRADE NOTE:    52y Female transferred to floor from ICU    Patient is a 52y old Female who presents with a chief complaint of profuse bleeding from paracentesis site (28 Apr 2018 06:59)    The patient is currently admitted for the primary diagnosis of bleeding from paracentesis site and DKA (diabetic ketoacidoses)    The patient was admitted to the unit for6 Days.    The patient was never) intubated , and wason pressors for  3 days.    Indwelling vascular catheters:n/a    Urinary Catheter: n/a    Disposition: downgrade->d/c to hospice    Code Status: DNR, DNI    ICU COURSE OF EVENTS:  -------------------------------------------------------------------------------------------  53 yo F transfer from [facility] with PMHx of HTN, DM, CAD s/p CABG and stent (last stent 5 years ago), liver cirrhosis (idiopathic), CLL, ascites, s/p Hartmans and reversal for complicated diverticulitis and colovesicular fistula 3-4 years ago, has incisional hernia associated with previous parastomal site, large, nonobstructive, not surgical candidate, currently in hospice. Pt also has hepato-renal syndrome and abdominal skin maceration with 1 cm wound s/p closure.   Pt recently had leaking of ascites from the hernia for which surgery (Dr. Harris's team) had sutured her    - IN ED, pt found to have drop in H/H 9 -> 6.6, WBC 50 and in DKA.   - pt resended her hospice to get 2 unit of prbc transfusion.           course of events summary:  night of 4/27-4/28 left hospice and presents to ER and admission for leaking ascites and bleeding perfusion from abdominal site near insertion of instruments after paracentesis on 4/23--->[facility]. Pt went into DKA admitted to CCU  4/29- 5/2 h/h stable; anion gap opening and closing.  Discussed with patient and sister goals of care-- pt will return to hospice care.      -------------------------------------------------------------------------------------------    Current workup in progress:  return to hospice      SIGN OUT AT 05-02-18 @ 14:52 GIVEN TO:

## 2018-05-02 NOTE — PROGRESS NOTE ADULT - SUBJECTIVE AND OBJECTIVE BOX
Patient is a 52y old  Female who presents with a chief complaint of profuse bleeding from paracentesis site (28 Apr 2018 06:59)    HPI:  51 yo F transfer from [facility] with PMHx of HTN, DM, CAD s/p CABG and stent (last stent 5 years ago), liver cirrhosis (idiopathic), CLL, ascites, s/p Hartmans and reversal for complicated diverticulitis and colovesicular fistula 3-4 years ago, has incisional hernia associated with previous parastomal site, large, nonobstructive, not surgical candidate, currently in hospice. Pt also has hepato-renal syndrome and abdominal skin maceration with 1 cm wound s/p closure.   Pt recently had leaking of ascites from the hernia for which surgery (Dr. Harris's team) had sutured her. After that the family wished it would be easier on the patient to not suture but to put a colostomy bag on the leaking site. She has been draining on avarage 2-3 liter every few days. Pt underwent a paracentesis on Monday, 4/23/18. Pt came to the ER yesterday with profuse bleeding from the abdomen .   Pt denies fever, chills, SOB, CP, abdominal pain, N/V/D.    - IN ED, pt found to have drop in H/H 9 -> 6.6, WBC 50 and in DKA.   - pt resended her hospice to get 2 unit of prbc transfusion.   - pt is s/p 2 L NS bolus, on insulin drip and  cc/hr (28 Apr 2018 06:59)        Course of events:  night of 4/27-4/28 left hospice presentats to ER and admission for leaking ascites and bleeding perfusion from abdominal site near insertion of instruments after paracentesis on 4/23--->surgery suture site at [facility] and sent to [facility]. Pt went into HSS admitted to CCU  4/29- 5/1 h/h stable; anion gap closing and opening HPI:  53 yo F transfer from [facility] with PMHx of HTN, DM, CAD s/p CABG and stent (last stent 5 years ago), liver cirrhosis (idiopathic), CLL, ascites, s/p Hartmans and reversal for complicated diverticulitis and colovesicular fistula 3-4 years ago, has incisional hernia associated with previous parastomal site, large, nonobstructive, not surgical candidate, currently in hospice. Pt also has hepato-renal syndrome and abdominal skin maceration with 1 cm wound s/p closure.   Pt recently had leaking of ascites from the hernia for which surgery (Dr. Harris's team) had sutured her. After that the family wished it would be easier on the patient to not suture but to put a colostomy bag on the leaking site. She has been draining on avarage 2-3 liter every few days. Pt underwent a paracentesis on Monday, 4/23/18. Pt came to the ER yesterday with profuse bleeding from the abdomen .   Pt denies fever, chills, SOB, CP, abdominal pain, N/V/D.    - IN ED, pt found to have drop in H/H 9 -> 6.6, WBC 50 and in DKA.   - pt resended her hospice to get 2 unit of prbc transfusion.   - pt is s/p 2 L NS bolus, on insulin drip and  cc/hr (28 Apr 2018 06:59)        Course of events:  night of 4/27-4/28 left hospice presentats to ER and admission for leaking ascites and bleeding perfusion from abdominal site near insertion of instruments after paracentesis on 4/23--->surgery suture site at [facility] and sent to [facility]. Pt went into HSS admitted to CCU  4/29- 5/1 h/h stable; anion gap closing and opening      OVERNIGHT/DAY EVENTS: no events    CAPILLARY BLOOD GLUCOSE  246 (02 May 2018 10:00)  146 (02 May 2018 08:00)  127 (02 May 2018 06:00)  161 (01 May 2018 22:00)  150 (01 May 2018 18:00)  103 (01 May 2018 16:00)          VITAL SIGNS:  Vital Signs Last 24 Hrs  T(C): 36.1 (02 May 2018 12:00), Max: 36.5 (01 May 2018 20:00)  T(F): 97 (02 May 2018 12:00), Max: 97.7 (01 May 2018 20:00)  HR: 88 (02 May 2018 12:00) (73 - 91)  BP: 107/61 (02 May 2018 12:00) (83/49 - 124/61)  BP(mean): 73 (02 May 2018 12:00) (68 - 96)  RR: 14 (02 May 2018 12:00) (14 - 22)  SpO2: 98% (02 May 2018 12:00) (98% - 99%)      05-01 @ 07:01  -  05-02 @ 07:00  --------------------------------------------------------  IN: 5561 mL / OUT: 1050 mL / NET: 4511 mL    05-02 @ 07:01  -  05-02 @ 15:13  --------------------------------------------------------  IN: 650 mL / OUT: 27 mL / NET: 623 mL        PHYSICAL EXAM:  General: NAD  HEENT: NC/AT; PERRL, yellow conjunctiva  Neck: supple  Respiratory: CTA b/l  Cardiovascular: +S1/S2; RRR  Abdomen: soft, NT/ND; +BS x4  Extremities:  no LE edema  Vascular: WWP, 2+ peripheral pulses b/l;  Skin: skin thin with breaks to skin on abdomen.  Neurological: A&Ox3, move all extremities. CN II-XII intact    LABS:                        8.9<L>  21.74<H> )-----------( 131      ( 05-02 @ 04:42 )             26.9<L>                          9.5<L>  44.38<HH> )-----------( 218      ( 05-01 @ 04:42 )             27.6<L>      129<L>  |  101  |  25<H>  ----------------------------<  133<H>  05-02 @ 04:42  4.4   |  16<L>  |  0.6<L>      122<L>  |  94<L>  |  27<H>  ----------------------------<  188<H>  05-01 @ 21:38  4.4   |  16<L>  |  0.7        Ca    7.3<L>   Ca    6.9<L>   Mg     2.2     TPro  3.5<L>  /  Alb  1.8<L>  /  TBili  7.2<H>  /  DBili  x   /  AST  94<H>  /  ALT  176<H>  /  AlkPhos  971<H>    TPro  3.3<L>  /  Alb  1.7<L>  /  TBili  6.1<H>  /  DBili  x   /  AST  100<H>  /  ALT  178<H>  /  AlkPhos  903<H>      Alb: 1.8 g/dL / Pro: 3.5 g/dL / ALK PHOS: 971 U/L / ALT: 176 U/L / AST: 94 U/L / GGT: x           PT: 11.90 sec;   INR: 1.10 ratio;  PTT: 24.5 sec     PT: 11.40 sec;   INR: 1.05 ratio;  PTT: 23.8 sec           CARDIAC MARKERS ( 02 May 2018 04:42 )  x     / 0.06 ng/mL / 33 U/L / x     / x      CARDIAC MARKERS ( 01 May 2018 04:42 )  x     / 0.07 ng/mL / 34 U/L / x     / x            RADIOLOGY & ADDITIONAL TESTS:    MEDICATIONS  (STANDING):  dextrose 5% + sodium chloride 0.9%. 1000 milliLiter(s) (75 mL/Hr) IV Continuous <Continuous>  dextrose 5%. 1000 milliLiter(s) (50 mL/Hr) IV Continuous <Continuous>  dextrose 50% Injectable 25 milliLiter(s) IV Push every 15 minutes  dextrose 50% Injectable 25 Gram(s) IV Push once  docusate sodium 100 milliGRAM(s) Oral daily  folic acid 1 milliGRAM(s) Oral daily  hydrOXYzine hydrochloride 25 milliGRAM(s) Oral four times a day  insulin glargine Injectable (LANTUS) 15 Unit(s) SubCutaneous every morning  insulin lispro (HumaLOG) corrective regimen sliding scale   SubCutaneous three times a day before meals  insulin lispro Injectable (HumaLOG) 5 Unit(s) SubCutaneous before breakfast  insulin lispro Injectable (HumaLOG) 5 Unit(s) SubCutaneous before lunch  insulin lispro Injectable (HumaLOG) 5 Unit(s) SubCutaneous before dinner  meropenem  IVPB      meropenem  IVPB 1000 milliGRAM(s) IV Intermittent every 8 hours  midodrine 10 milliGRAM(s) Oral three times a day  pantoprazole    Tablet 40 milliGRAM(s) Oral before breakfast  phenylephrine    Infusion 0.25 MICROgram(s)/kG/Min (1.73 mL/Hr) IV Continuous <Continuous>  spironolactone 100 milliGRAM(s) Oral daily    MEDICATIONS  (PRN):  dextrose Gel 1 Dose(s) Oral once PRN Blood Glucose LESS THAN 70 milliGRAM(s)/deciliter  glucagon  Injectable 1 milliGRAM(s) IntraMuscular once PRN Glucose LESS THAN 70 milligrams/deciliter  glucagon  Injectable 1 milliGRAM(s) IntraMuscular once PRN Glucose LESS THAN 70 milligrams/deciliter  LORazepam     Tablet 1 milliGRAM(s) Oral three times a day PRN Anxiety  ondansetron Injectable 4 milliGRAM(s) IV Push every 6 hours PRN Nausea and/or Vomiting      PAST MEDICAL & SURGICAL HISTORY:  Psoriasis  HTN (hypertension)  CAD (coronary artery disease)  Diverticulitis  HTN (hypertension)  DM (diabetes mellitus)  Liver cirrhosis  Stented coronary artery  S/P CABG (coronary artery bypass graft)  CLL (chronic lymphocytic leukemia)  Hernia  S/P cystoscopy  H/O colostomy  S/P CABG (coronary artery bypass graft)  History of colon resection      Allergies    No Known Allergies

## 2018-05-03 LAB
ANION GAP SERPL CALC-SCNC: 16 MMOL/L — HIGH (ref 7–14)
BUN SERPL-MCNC: 24 MG/DL — HIGH (ref 10–20)
CALCIUM SERPL-MCNC: 7.9 MG/DL — LOW (ref 8.5–10.1)
CHLORIDE SERPL-SCNC: 94 MMOL/L — LOW (ref 98–110)
CO2 SERPL-SCNC: 14 MMOL/L — LOW (ref 17–32)
CREAT SERPL-MCNC: 0.8 MG/DL — SIGNIFICANT CHANGE UP (ref 0.7–1.5)
CULTURE RESULTS: SIGNIFICANT CHANGE UP
GLUCOSE SERPL-MCNC: 119 MG/DL — HIGH (ref 70–99)
POTASSIUM SERPL-MCNC: 4.6 MMOL/L — SIGNIFICANT CHANGE UP (ref 3.5–5)
POTASSIUM SERPL-SCNC: 4.6 MMOL/L — SIGNIFICANT CHANGE UP (ref 3.5–5)
SODIUM SERPL-SCNC: 124 MMOL/L — LOW (ref 135–146)
SPECIMEN SOURCE: SIGNIFICANT CHANGE UP

## 2018-05-03 PROCEDURE — 99233 SBSQ HOSP IP/OBS HIGH 50: CPT

## 2018-05-03 RX ADMIN — MEROPENEM 100 MILLIGRAM(S): 1 INJECTION INTRAVENOUS at 06:57

## 2018-05-03 RX ADMIN — MIDODRINE HYDROCHLORIDE 10 MILLIGRAM(S): 2.5 TABLET ORAL at 21:27

## 2018-05-03 RX ADMIN — PANTOPRAZOLE SODIUM 40 MILLIGRAM(S): 20 TABLET, DELAYED RELEASE ORAL at 11:45

## 2018-05-03 RX ADMIN — Medication 25 MILLIGRAM(S): at 23:13

## 2018-05-03 RX ADMIN — MEROPENEM 100 MILLIGRAM(S): 1 INJECTION INTRAVENOUS at 00:25

## 2018-05-03 RX ADMIN — MIDODRINE HYDROCHLORIDE 10 MILLIGRAM(S): 2.5 TABLET ORAL at 14:05

## 2018-05-03 RX ADMIN — Medication 25 MILLIGRAM(S): at 05:49

## 2018-05-03 RX ADMIN — MEROPENEM 100 MILLIGRAM(S): 1 INJECTION INTRAVENOUS at 21:27

## 2018-05-03 RX ADMIN — Medication 100 MILLIGRAM(S): at 14:04

## 2018-05-03 RX ADMIN — Medication 1 MILLIGRAM(S): at 11:45

## 2018-05-03 RX ADMIN — SPIRONOLACTONE 100 MILLIGRAM(S): 25 TABLET, FILM COATED ORAL at 05:49

## 2018-05-03 RX ADMIN — MEROPENEM 100 MILLIGRAM(S): 1 INJECTION INTRAVENOUS at 14:06

## 2018-05-03 RX ADMIN — MIDODRINE HYDROCHLORIDE 10 MILLIGRAM(S): 2.5 TABLET ORAL at 05:49

## 2018-05-03 RX ADMIN — Medication 5 UNIT(S): at 17:20

## 2018-05-03 RX ADMIN — Medication 25 MILLIGRAM(S): at 17:20

## 2018-05-03 RX ADMIN — Medication 25 MILLIGRAM(S): at 11:45

## 2018-05-03 NOTE — PROGRESS NOTE ADULT - SUBJECTIVE AND OBJECTIVE BOX
TOYA SERNA MRN-[id number]    Hospitalist Note  53yo F with Past Medical History Psoriasis, HTN, CAD status post CABG & PCI, Liver Cirrhosis, CLL, and large incisional hernia with ostomy bag and drainage, admitted to [facility] for abdominal bleeding status post paracentesis (4/23/18).  Hospice orders were initially revoked on admission, though the patient remains DNR/DNI.      Overnight events/Updates: Hgb has stabilized at 8.9.  No further bleeding reported from the abdominal wound.    Vital Signs Last 24 Hrs  T(C): 36.7 (03 May 2018 13:11), Max: 36.7 (03 May 2018 13:11)  T(F): 98 (03 May 2018 13:11), Max: 98 (03 May 2018 13:11)  HR: 97 (03 May 2018 13:11) (77 - 97)  BP: 120/75 (03 May 2018 13:11) (91/60 - 120/75)  BP(mean): 91 (02 May 2018 18:00) (91 - 91)  RR: 18 (03 May 2018 13:11) (18 - 18)  SpO2: 100% (03 May 2018 16:07) (98% - 100%)    Physical Examination:  General: AAO x 2-3  HEENT: PERRLA, EOMI  CV= S1 & S2 appreciated  Lungs=CTA BL  Abdominal Examination= + BS, + ostomy site  Extremity Examination= decreased muscle mass without edema    ROS: No chest pain, no shortness of breath.  All other systems reviewed and are within normal limits except for the complaints in the HPI.    MEDICATIONS  (STANDING):  dextrose 5%. 1000 milliLiter(s) (50 mL/Hr) IV Continuous <Continuous>  dextrose 50% Injectable 25 milliLiter(s) IV Push every 15 minutes  dextrose 50% Injectable 25 Gram(s) IV Push once  docusate sodium 100 milliGRAM(s) Oral daily  folic acid 1 milliGRAM(s) Oral daily  hydrOXYzine hydrochloride 25 milliGRAM(s) Oral four times a day  insulin glargine Injectable (LANTUS) 15 Unit(s) SubCutaneous every morning  insulin lispro (HumaLOG) corrective regimen sliding scale   SubCutaneous three times a day before meals  insulin lispro Injectable (HumaLOG) 5 Unit(s) SubCutaneous before breakfast  insulin lispro Injectable (HumaLOG) 5 Unit(s) SubCutaneous before lunch  insulin lispro Injectable (HumaLOG) 5 Unit(s) SubCutaneous before dinner  meropenem  IVPB      meropenem  IVPB 1000 milliGRAM(s) IV Intermittent every 8 hours  midodrine 10 milliGRAM(s) Oral three times a day  pantoprazole    Tablet 40 milliGRAM(s) Oral before breakfast  spironolactone 100 milliGRAM(s) Oral daily    MEDICATIONS  (PRN):  dextrose Gel 1 Dose(s) Oral once PRN Blood Glucose LESS THAN 70 milliGRAM(s)/deciliter  glucagon  Injectable 1 milliGRAM(s) IntraMuscular once PRN Glucose LESS THAN 70 milligrams/deciliter  glucagon  Injectable 1 milliGRAM(s) IntraMuscular once PRN Glucose LESS THAN 70 milligrams/deciliter  LORazepam     Tablet 1 milliGRAM(s) Oral three times a day PRN Anxiety  ondansetron Injectable 4 milliGRAM(s) IV Push every 6 hours PRN Nausea and/or Vomiting                            8.9    21.74 )-----------( 131      ( 02 May 2018 04:42 )             26.9     05-02    129<L>  |  101  |  25<H>  ----------------------------<  133<H>  4.4   |  16<L>  |  0.6<L>    Ca    7.3<L>      02 May 2018 04:42    TPro  3.5<L>  /  Alb  1.8<L>  /  TBili  7.2<H>  /  DBili  x   /  AST  94<H>  /  ALT  176<H>  /  AlkPhos  971<H>  05-02      Case discussed with housestaff & family  VALDO Velez [id number]

## 2018-05-03 NOTE — PROGRESS NOTE ADULT - ASSESSMENT
53 y/o female who rescinded her home hospice to receive 2U pRBC s/p bleeding from paracentesis site, sutured at south and transferred to [facility] site. Downgraded to floor. Plans to return to home hospice.    Plan:  No acute surgical intervention  continue with current management  call surgery if further assistance is required

## 2018-05-03 NOTE — PROGRESS NOTE ADULT - ASSESSMENT
53yo F with Past Medical History Psoriasis, HTN, CAD status post CABG & PCI, Liver Cirrhosis, CLL, and large incisional hernia with ostomy bag and drainage, admitted to [facility] for abdominal bleeding status post paracentesis (4/23/18).     Acute blood loss anemia status post paracentesis: her belly appears mildly distended though there is no evidence of overt bleeding.  Hgb has stabilized at 8.9.  Discontinue IV Merrem over the following 24 hours.  Consider switching to PO Cipro if there are no contraindications.  Leukocytosis: likely related to CLL.  WBC improved to <25k.  I would not pursue any further additional workup at this time,  Metabolic acidosis: AG improved to 12 from admission with improved glycemic control.  DMII: continue insulin sliding scale and monitor FS with meals.  GI prophylaxis  Discharge planning; return to home with hospice

## 2018-05-03 NOTE — PROGRESS NOTE ADULT - NSHPATTENDINGPLANDISCUSS_GEN_ALL_CORE
staff
pt , family  and surgical steam
pt , family, ed , medical team and surgical steam
pt , family, ed , medical team and surgical steam
pt and surgical steam

## 2018-05-03 NOTE — PROGRESS NOTE ADULT - SUBJECTIVE AND OBJECTIVE BOX
TOYA SERNA  52y Female   [id number]    Hospital Day: 6    Procedure/dx: leakage of ascites from and bleeding from abdominal paracentesis site s/p suturing  Events of the Last 24h: downgraded to floor, plans to return to hospice care    Patient is a 52y old  Female who presents with a chief complaint of profuse bleeding from paracentesis site (28 Apr 2018 06:59)    PAST MEDICAL & SURGICAL HISTORY:  Psoriasis  HTN (hypertension)  CAD (coronary artery disease)  Diverticulitis  HTN (hypertension)  DM (diabetes mellitus)  Liver cirrhosis  Stented coronary artery  S/P CABG (coronary artery bypass graft)  CLL (chronic lymphocytic leukemia)  Hernia  S/P cystoscopy  H/O colostomy  S/P CABG (coronary artery bypass graft)  History of colon resection      Vital Signs Last 24 Hrs  T(C): 35.9 (02 May 2018 20:55), Max: 36.3 (02 May 2018 08:00)  T(F): 96.7 (02 May 2018 20:55), Max: 97.4 (02 May 2018 16:00)  HR: 77 (02 May 2018 20:55) (66 - 100)  BP: 99/53 (02 May 2018 20:55) (99/53 - 124/61)  BP(mean): 91 (02 May 2018 18:00) (73 - 91)  RR: 18 (02 May 2018 20:55) (14 - 22)  SpO2: 98% (02 May 2018 18:00) (97% - 98%)        Diet, Consistent Carbohydrate w/Evening Snack (05-01-18 @ 10:59)      I&O's Detail    02 May 2018 07:01  -  03 May 2018 05:29  --------------------------------------------------------  IN:    dextrose 5% + sodium chloride 0.9%: 925 mL    Oral Fluid: 200 mL    Other: 585 mL  Total IN: 1710 mL    OUT:    Other: 175 mL    Voided: 58 mL  Total OUT: 233 mL    Total NET: 1477 mL          MEDICATIONS  (STANDING):  dextrose 5%. 1000 milliLiter(s) (50 mL/Hr) IV Continuous <Continuous>  dextrose 50% Injectable 25 milliLiter(s) IV Push every 15 minutes  dextrose 50% Injectable 25 Gram(s) IV Push once  docusate sodium 100 milliGRAM(s) Oral daily  folic acid 1 milliGRAM(s) Oral daily  hydrOXYzine hydrochloride 25 milliGRAM(s) Oral four times a day  insulin glargine Injectable (LANTUS) 15 Unit(s) SubCutaneous every morning  insulin lispro (HumaLOG) corrective regimen sliding scale   SubCutaneous three times a day before meals  insulin lispro Injectable (HumaLOG) 5 Unit(s) SubCutaneous before breakfast  insulin lispro Injectable (HumaLOG) 5 Unit(s) SubCutaneous before lunch  insulin lispro Injectable (HumaLOG) 5 Unit(s) SubCutaneous before dinner  meropenem  IVPB      meropenem  IVPB 1000 milliGRAM(s) IV Intermittent every 8 hours  midodrine 10 milliGRAM(s) Oral three times a day  pantoprazole    Tablet 40 milliGRAM(s) Oral before breakfast  spironolactone 100 milliGRAM(s) Oral daily    MEDICATIONS  (PRN):  dextrose Gel 1 Dose(s) Oral once PRN Blood Glucose LESS THAN 70 milliGRAM(s)/deciliter  glucagon  Injectable 1 milliGRAM(s) IntraMuscular once PRN Glucose LESS THAN 70 milligrams/deciliter  glucagon  Injectable 1 milliGRAM(s) IntraMuscular once PRN Glucose LESS THAN 70 milligrams/deciliter  LORazepam     Tablet 1 milliGRAM(s) Oral three times a day PRN Anxiety  ondansetron Injectable 4 milliGRAM(s) IV Push every 6 hours PRN Nausea and/or Vomiting      PHYSICAL EXAM:    GENERAL: NAD    HEENT: NCAT    CHEST/LUNGS: CTAB    HEART: RRR,  No murmurs, rubs, or gallops    ABDOMEN: SNTND +BS    EXTREMITIES:  FROM, No clubbing, cyanosis, or edema, palpable pulse    NEURO: No focal neurological deficits    SKIN: No rashes or lesions    INCISION/WOUNDS:                          8.9    21.74 )-----------( 131      ( 02 May 2018 04:42 )             26.9        05-02    129<L>  |  101  |  25<H>  ----------------------------<  133<H>  4.4   |  16<L>  |  0.6<L>    Ca    7.3<L>      02 May 2018 04:42    TPro  3.5<L>  /  Alb  1.8<L>  /  TBili  7.2<H>  /  DBili  x   /  AST  94<H>  /  ALT  176<H>  /  AlkPhos  971<H>  05-02    LIVER FUNCTIONS - ( 02 May 2018 04:42 )  Alb: 1.8 g/dL / Pro: 3.5 g/dL / ALK PHOS: 971 U/L / ALT: 176 U/L / AST: 94 U/L / GGT: x           PT/INR - ( 02 May 2018 04:42 )   PT: 11.90 sec;   INR: 1.10 ratio         PTT - ( 02 May 2018 04:42 )  PTT:24.5 sec  CARDIAC MARKERS ( 02 May 2018 04:42 )  x     / 0.06 ng/mL / 33 U/L / x     / x          SPECTRA: [id number]

## 2018-05-03 NOTE — PROGRESS NOTE ADULT - ATTENDING COMMENTS
Pt with cryptogenic cirrhosis and liver failure.   Pt with Chronic Leukemia and unable to get liver transplant.  Pt has massive hernia which has been deemed un operable by many surgeons.   Pt has been on Hospice.  Bleeding   from abdominal wound s/p bedside stiches x 2    Examined on 4/30 - doing well. on pressors . Had received 2 unit prbc . No more active bleeding.   Colostomy bag - draining ascites fluid.     Continue care.   Admit to Hospice  Bacterial Peritonitis prophylaxis.
Pt with cryptogenic cirrhosis and liver failure.   Pt with Chronic Leukemia and unable to get liver transplant.  Pt has massive hernia which has been deemed un operable by many surgeons.   Pt has been on Hospice.  Bleeding   from abdominal wound s/p bedside stiches x 2  - doing well. on pressors . Had received 2 unit prbc . No more active bleeding.   Colostomy bag - draining ascites fluid.     Has been confused  - AMS since earlier today.   Is ambulating and tolerating diet.  Continue care.   Bacterial Peritonitis prophylaxis.  Consider discharging her home.
Pt with cryptogenic cirrhosis and liver failure.   Pt with Chronic Leukemia and unable to get liver transplant.  Pt has massive hernia which has been deemed un operable by many surgeons.   Pt has been on Hospice.  Bleeding   from abdominal wound s/p bedside stiches x 2    Examined on 4/28 - doing well. Stable. Had received 1 unit prbc . No more active bleeding.     Check labs ,   Resuscitate.   Admit to Hospice  Bacterial Peritonitis prophylaxis.
Pt with cryptogenic cirrhosis and liver failure.   Pt with Chronic Leukemia and unable to get liver transplant.  Pt has massive hernia which has been deemed un operable by many surgeons.   Pt has been on Hospice.  Bleeding   from abdominal wound s/p bedside stiches x 2    Examined on 4/30 - doing well. on pressors . Had received 2 unit prbc . No more active bleeding.   Colostomy bag - draining ascites fluid.     Continue care.   Admit to Hospice  Bacterial Peritonitis prophylaxis.

## 2018-05-03 NOTE — PROGRESS NOTE ADULT - ASSESSMENT
CLL on hospice, Cryptogenic Cirrhosis, DKA GAP elevated, Bicarbonate 17     with acute blood loss  H/H stable    Hospice: patient and sister plan to go back to hospice care    GI: GI prophylaxis.  Feeding, Oral diet    ENDOCRINE:  Follow up FS.     RENAL: F/u BMPs    out of bed to chair    INFECTIOUS DISEASE:  Sepsis (pulse>90 beats/min,  wbc>12,  decreased systolic bp, lactic acidosis) due to possible spontaneous bacterial peritonitis---pt still on meropenem    HEMATOLOGICAL:  DVT prophylaxis.

## 2018-05-03 NOTE — PROGRESS NOTE ADULT - SUBJECTIVE AND OBJECTIVE BOX
SUBJECTIVE:    Patient is a 52y old  Female who presents with a chief complaint of profuse bleeding from paracentesis site (28 Apr 2018 06:59)    Currently admitted to medicine with the primary diagnosis of DKA (diabetic ketoacidoses)     Today is hospital day 5d. This morning she is resting comfortably in bed and reports no new issues or overnight events.     PAST MEDICAL & SURGICAL HISTORY  PAST MEDICAL & SURGICAL HISTORY:  Psoriasis  HTN (hypertension)  CAD (coronary artery disease)  Diverticulitis  HTN (hypertension)  DM (diabetes mellitus)  Liver cirrhosis  Stented coronary artery  S/P CABG (coronary artery bypass graft)  CLL (chronic lymphocytic leukemia)  Hernia  S/P cystoscopy  H/O colostomy  S/P CABG (coronary artery bypass graft)  History of colon resection    SOCIAL HISTORY:    ALLERGIES:  No Known Allergies    MEDICATIONS:  STANDING MEDICATIONS  dextrose 5%. 1000 milliLiter(s) IV Continuous <Continuous>  dextrose 50% Injectable 25 milliLiter(s) IV Push every 15 minutes  dextrose 50% Injectable 25 Gram(s) IV Push once  docusate sodium 100 milliGRAM(s) Oral daily  folic acid 1 milliGRAM(s) Oral daily  hydrOXYzine hydrochloride 25 milliGRAM(s) Oral four times a day  insulin glargine Injectable (LANTUS) 15 Unit(s) SubCutaneous every morning  insulin lispro (HumaLOG) corrective regimen sliding scale   SubCutaneous three times a day before meals  insulin lispro Injectable (HumaLOG) 5 Unit(s) SubCutaneous before breakfast  insulin lispro Injectable (HumaLOG) 5 Unit(s) SubCutaneous before lunch  insulin lispro Injectable (HumaLOG) 5 Unit(s) SubCutaneous before dinner  meropenem  IVPB      meropenem  IVPB 1000 milliGRAM(s) IV Intermittent every 8 hours  midodrine 10 milliGRAM(s) Oral three times a day  pantoprazole    Tablet 40 milliGRAM(s) Oral before breakfast  spironolactone 100 milliGRAM(s) Oral daily    PRN MEDICATIONS  dextrose Gel 1 Dose(s) Oral once PRN  glucagon  Injectable 1 milliGRAM(s) IntraMuscular once PRN  glucagon  Injectable 1 milliGRAM(s) IntraMuscular once PRN  LORazepam     Tablet 1 milliGRAM(s) Oral three times a day PRN  ondansetron Injectable 4 milliGRAM(s) IV Push every 6 hours PRN    VITALS:   T(F): 96.5  HR: 78  BP: 91/60  RR: 18  SpO2: 98%    LABS:                        8.9    21.74 )-----------( 131      ( 02 May 2018 04:42 )             26.9     05-02    129<L>  |  101  |  25<H>  ----------------------------<  133<H>  4.4   |  16<L>  |  0.6<L>    Ca    7.3<L>      02 May 2018 04:42    TPro  3.5<L>  /  Alb  1.8<L>  /  TBili  7.2<H>  /  DBili  x   /  AST  94<H>  /  ALT  176<H>  /  AlkPhos  971<H>  05-02    PT/INR - ( 02 May 2018 04:42 )   PT: 11.90 sec;   INR: 1.10 ratio         PTT - ( 02 May 2018 04:42 )  PTT:24.5 sec          CARDIAC MARKERS ( 02 May 2018 04:42 )  x     / 0.06 ng/mL / 33 U/L / x     / x          RADIOLOGY:    PHYSICAL EXAM:    Respiratory: CTA b/l  Cardiovascular: +S1/S2; RRR  Abdomen: soft, NT/ND; +BS x4  Extremities:  no LE edema  Vascular: WWP, 2+ peripheral pulses b/l;  Skin: skin thin with breaks to skin on abdomen.  Neurological: A&Ox3, move all extremities. CN II-XII intact

## 2018-05-04 ENCOUNTER — TRANSCRIPTION ENCOUNTER (OUTPATIENT)
Age: 53
End: 2018-05-04

## 2018-05-04 ENCOUNTER — OUTPATIENT (OUTPATIENT)
Dept: OUTPATIENT SERVICES | Facility: HOSPITAL | Age: 53
LOS: 1 days | End: 2018-05-04

## 2018-05-04 VITALS — OXYGEN SATURATION: 100 %

## 2018-05-04 DIAGNOSIS — Z98.890 OTHER SPECIFIED POSTPROCEDURAL STATES: Chronic | ICD-10-CM

## 2018-05-04 DIAGNOSIS — Z95.1 PRESENCE OF AORTOCORONARY BYPASS GRAFT: Chronic | ICD-10-CM

## 2018-05-04 DIAGNOSIS — E11.9 TYPE 2 DIABETES MELLITUS WITHOUT COMPLICATIONS: ICD-10-CM

## 2018-05-04 DIAGNOSIS — R18.8 OTHER ASCITES: ICD-10-CM

## 2018-05-04 DIAGNOSIS — I10 ESSENTIAL (PRIMARY) HYPERTENSION: ICD-10-CM

## 2018-05-04 DIAGNOSIS — I25.10 ATHEROSCLEROTIC HEART DISEASE OF NATIVE CORONARY ARTERY WITHOUT ANGINA PECTORIS: ICD-10-CM

## 2018-05-04 LAB
ANION GAP SERPL CALC-SCNC: 15 MMOL/L — HIGH (ref 7–14)
BUN SERPL-MCNC: 23 MG/DL — HIGH (ref 10–20)
CALCIUM SERPL-MCNC: 8.3 MG/DL — LOW (ref 8.5–10.1)
CHLORIDE SERPL-SCNC: 95 MMOL/L — LOW (ref 98–110)
CO2 SERPL-SCNC: 16 MMOL/L — LOW (ref 17–32)
CREAT SERPL-MCNC: 0.6 MG/DL — LOW (ref 0.7–1.5)
CULTURE RESULTS: SIGNIFICANT CHANGE UP
GLUCOSE SERPL-MCNC: 103 MG/DL — HIGH (ref 70–99)
HCT VFR BLD CALC: 32.1 % — LOW (ref 37–47)
HGB BLD-MCNC: 10.8 G/DL — LOW (ref 12–16)
MCHC RBC-ENTMCNC: 28.6 PG — SIGNIFICANT CHANGE UP (ref 27–31)
MCHC RBC-ENTMCNC: 33.6 G/DL — SIGNIFICANT CHANGE UP (ref 32–37)
MCV RBC AUTO: 85.1 FL — SIGNIFICANT CHANGE UP (ref 81–99)
NRBC # BLD: 0 /100 WBCS — SIGNIFICANT CHANGE UP (ref 0–0)
PLATELET # BLD AUTO: 195 K/UL — SIGNIFICANT CHANGE UP (ref 130–400)
POTASSIUM SERPL-MCNC: 5 MMOL/L — SIGNIFICANT CHANGE UP (ref 3.5–5)
POTASSIUM SERPL-SCNC: 5 MMOL/L — SIGNIFICANT CHANGE UP (ref 3.5–5)
RBC # BLD: 3.77 M/UL — LOW (ref 4.2–5.4)
RBC # FLD: 16.7 % — HIGH (ref 11.5–14.5)
SODIUM SERPL-SCNC: 126 MMOL/L — LOW (ref 135–146)
SPECIMEN SOURCE: SIGNIFICANT CHANGE UP
WBC # BLD: 38.16 K/UL — HIGH (ref 4.8–10.8)
WBC # FLD AUTO: 38.16 K/UL — HIGH (ref 4.8–10.8)

## 2018-05-04 RX ORDER — MOXIFLOXACIN HYDROCHLORIDE TABLETS, 400 MG 400 MG/1
1 TABLET, FILM COATED ORAL
Qty: 14 | Refills: 0 | OUTPATIENT
Start: 2018-05-04 | End: 2018-05-10

## 2018-05-04 RX ADMIN — Medication 5 UNIT(S): at 08:23

## 2018-05-04 RX ADMIN — PANTOPRAZOLE SODIUM 40 MILLIGRAM(S): 20 TABLET, DELAYED RELEASE ORAL at 08:21

## 2018-05-04 RX ADMIN — Medication 25 MILLIGRAM(S): at 11:18

## 2018-05-04 RX ADMIN — Medication 1 MILLIGRAM(S): at 11:18

## 2018-05-04 RX ADMIN — INSULIN GLARGINE 15 UNIT(S): 100 INJECTION, SOLUTION SUBCUTANEOUS at 08:22

## 2018-05-04 RX ADMIN — Medication 25 MILLIGRAM(S): at 05:37

## 2018-05-04 RX ADMIN — MIDODRINE HYDROCHLORIDE 10 MILLIGRAM(S): 2.5 TABLET ORAL at 05:37

## 2018-05-04 RX ADMIN — MEROPENEM 100 MILLIGRAM(S): 1 INJECTION INTRAVENOUS at 05:37

## 2018-05-04 RX ADMIN — Medication 5 UNIT(S): at 12:12

## 2018-05-04 RX ADMIN — Medication 2: at 12:12

## 2018-05-04 RX ADMIN — Medication 100 MILLIGRAM(S): at 11:18

## 2018-05-04 RX ADMIN — SPIRONOLACTONE 100 MILLIGRAM(S): 25 TABLET, FILM COATED ORAL at 05:38

## 2018-05-04 NOTE — DISCHARGE NOTE ADULT - CARE PROVIDER_API CALL
Alex Kirk), Internal Medicine  [address] [place] [address]  Phone: [phone]  Fax: [phone]    Hospice,   You were set up with home hospice upon discharge  Phone: (   )    -  Fax: (   )    -

## 2018-05-04 NOTE — PROGRESS NOTE ADULT - ASSESSMENT
51yo F with Past Medical History Psoriasis, HTN, CAD status post CABG & PCI, Liver Cirrhosis, CLL, and large incisional hernia with ostomy bag and drainage, admitted to [facility] for abdominal bleeding status post paracentesis (4/23/18).     Acute blood loss anemia status post paracentesis: no new complaints.  No further bleeding reported.  Hgb improved to 10.8.  For possible SBP, Merrem will be discontinued upon discharged, and the patient will continue therapeutic Cipro 500mg PO q12 for one week.  Leukocytosis: likely related to CLL.  WBC elevated @ 38.2, which is likely related due to underlying malignancy.    Metabolic acidosis: AG remains slightly elevated at 15.   DMII: continue insulin sliding scale and monitor FS with meals.  GI prophylaxis  Discharge home with home hospice; time spent = 35 minutes

## 2018-05-04 NOTE — PROGRESS NOTE ADULT - PROVIDER SPECIALTY LIST ADULT
CCU
Critical Care
Hospitalist
Hospitalist
Internal Medicine
Surgery
Surgery
CCU
CCU
Surgery
Surgery

## 2018-05-04 NOTE — DISCHARGE NOTE ADULT - CARE PLAN
Principal Discharge DX:	Anemia  Goal:	Clinical Recovery  Assessment and plan of treatment:	You were admitted to [facility] for acute blood loss anemia due to abdominal bleeding s/p paracentesis 4/23/18. Your hemoglobin has stabilized at 8.9. For prophylaxis of an intraabdominal infection called Spontaneous Bacterial Peritonitis, you were treated with IV antibiotics (Meropenem) and transitioned to oral Cipro. Please take 500mg every 12 hours for one week, then resume home regimen of 3 times a week for prophylaxis.  Secondary Diagnosis:	Cryptogenic cirrhosis  Goal:	Clinical Followup  Assessment and plan of treatment:	You have been diagnosed with idiopathic, cryptogenic cirrhosis with hepato-renal syndrome. After discussions at length, it was desided to return to home with hospice  Secondary Diagnosis:	DKA (diabetic ketoacidoses)  Goal:	Clinical Recovery  Assessment and plan of treatment:	You were diagnosed and treated for diabetic ketoacidosis. Your anion gap, a laboratory marker of clinical improvement, corrected back to normal. It is important to monitor glucose levels and maintain adequate control of Diabetes to prevent recurrence.  Secondary Diagnosis:	DM (diabetes mellitus)  Goal:	Clinical Stabilization  Assessment and plan of treatment:	Please continue with home medications and follow up with your PMD Principal Discharge DX:	Anemia  Goal:	Clinical Recovery  Assessment and plan of treatment:	You were admitted to [facility] for acute blood loss anemia due to abdominal bleeding s/p paracentesis 4/23/18. Your hemoglobin has stabilized at 10.8. For prophylaxis of an intraabdominal infection called Spontaneous Bacterial Peritonitis, you were treated with IV antibiotics (Meropenem) and transitioned to oral Cipro. Please take 500mg every 12 hours for one week, then resume home regimen of 3 times a week for prophylaxis.  Secondary Diagnosis:	Cryptogenic cirrhosis  Goal:	Clinical Followup  Assessment and plan of treatment:	You have been diagnosed with idiopathic, cryptogenic cirrhosis with hepato-renal syndrome. After discussions at length, it was desided to return to home with hospice  Secondary Diagnosis:	DKA (diabetic ketoacidoses)  Goal:	Clinical Recovery  Assessment and plan of treatment:	You were diagnosed and treated for diabetic ketoacidosis. Your anion gap, a laboratory marker of clinical improvement, corrected back to normal. It is important to monitor glucose levels and maintain adequate control of Diabetes to prevent recurrence.  Secondary Diagnosis:	DM (diabetes mellitus)  Goal:	Clinical Stabilization  Assessment and plan of treatment:	Please continue with home medications and follow up with your PMD

## 2018-05-04 NOTE — DISCHARGE NOTE ADULT - PLAN OF CARE
Clinical Recovery You were admitted to [facility] for acute blood loss anemia due to abdominal bleeding s/p paracentesis 4/23/18. Your hemoglobin has stabilized at 8.9. For prophylaxis of an intraabdominal infection called Spontaneous Bacterial Peritonitis, you were treated with IV antibiotics (Meropenem) and transitioned to oral Cipro. Please take 500mg every 12 hours for one week, then resume home regimen of 3 times a week for prophylaxis. Clinical Followup You have been diagnosed with idiopathic, cryptogenic cirrhosis with hepato-renal syndrome. After discussions at length, it was desided to return to home with hospice You were diagnosed and treated for diabetic ketoacidosis. Your anion gap, a laboratory marker of clinical improvement, corrected back to normal. It is important to monitor glucose levels and maintain adequate control of Diabetes to prevent recurrence. Clinical Stabilization Please continue with home medications and follow up with your PMD You were admitted to [facility] for acute blood loss anemia due to abdominal bleeding s/p paracentesis 4/23/18. Your hemoglobin has stabilized at 10.8. For prophylaxis of an intraabdominal infection called Spontaneous Bacterial Peritonitis, you were treated with IV antibiotics (Meropenem) and transitioned to oral Cipro. Please take 500mg every 12 hours for one week, then resume home regimen of 3 times a week for prophylaxis.

## 2018-05-04 NOTE — PROGRESS NOTE ADULT - SUBJECTIVE AND OBJECTIVE BOX
TOYA SERNA MRN-[id number]    Hospitalist Note  53yo F with Past Medical History Psoriasis, HTN, CAD status post CABG & PCI, Liver Cirrhosis, CLL, and large incisional hernia with ostomy bag and drainage, admitted to [facility] for abdominal bleeding status post paracentesis (4/23/18).      Overnight events/Updates: Hgb improved to 10.8.  No acute events over the past 24 hours    Vital Signs Last 24 Hrs  T(C): 36.3 (04 May 2018 04:31), Max: 36.7 (03 May 2018 13:11)  T(F): 97.3 (04 May 2018 04:31), Max: 98 (03 May 2018 13:11)  HR: 74 (04 May 2018 04:31) (74 - 97)  BP: 109/59 (04 May 2018 04:31) (109/59 - 120/75)  BP(mean): --  RR: 18 (03 May 2018 19:39) (18 - 18)  SpO2: 100% (04 May 2018 10:31) (100% - 100%)    Physical Examination:  General: AAO x 2-3  HEENT: PERRLA, EOMI  CV= S1 & S2 appreciated  Lungs=CTA BL  Abdominal Examination= + BS, + ostomy site  Extremity Examination= decreased muscle mass without edema    ROS: No chest pain, no shortness of breath.  All other systems reviewed and are within normal limits except for the complaints in the HPI.    MEDICATIONS  (STANDING):  dextrose 5%. 1000 milliLiter(s) (50 mL/Hr) IV Continuous <Continuous>  dextrose 50% Injectable 25 milliLiter(s) IV Push every 15 minutes  dextrose 50% Injectable 25 Gram(s) IV Push once  docusate sodium 100 milliGRAM(s) Oral daily  folic acid 1 milliGRAM(s) Oral daily  hydrOXYzine hydrochloride 25 milliGRAM(s) Oral four times a day  insulin glargine Injectable (LANTUS) 15 Unit(s) SubCutaneous every morning  insulin lispro (HumaLOG) corrective regimen sliding scale   SubCutaneous three times a day before meals  insulin lispro Injectable (HumaLOG) 5 Unit(s) SubCutaneous before breakfast  insulin lispro Injectable (HumaLOG) 5 Unit(s) SubCutaneous before lunch  insulin lispro Injectable (HumaLOG) 5 Unit(s) SubCutaneous before dinner  meropenem  IVPB      meropenem  IVPB 1000 milliGRAM(s) IV Intermittent every 8 hours  midodrine 10 milliGRAM(s) Oral three times a day  pantoprazole    Tablet 40 milliGRAM(s) Oral before breakfast  spironolactone 100 milliGRAM(s) Oral daily    MEDICATIONS  (PRN):  dextrose Gel 1 Dose(s) Oral once PRN Blood Glucose LESS THAN 70 milliGRAM(s)/deciliter  glucagon  Injectable 1 milliGRAM(s) IntraMuscular once PRN Glucose LESS THAN 70 milligrams/deciliter  glucagon  Injectable 1 milliGRAM(s) IntraMuscular once PRN Glucose LESS THAN 70 milligrams/deciliter  LORazepam     Tablet 1 milliGRAM(s) Oral three times a day PRN Anxiety  ondansetron Injectable 4 milliGRAM(s) IV Push every 6 hours PRN Nausea and/or Vomiting                            10.8   38.16 )-----------( 195      ( 04 May 2018 08:06 )             32.1     05-04    126<L>  |  95<L>  |  23<H>  ----------------------------<  103<H>  5.0   |  16<L>  |  0.6<L>    Ca    8.3<L>      04 May 2018 08:06        Case discussed with housestaff & family  VALDO Velez [id number]

## 2018-05-04 NOTE — DISCHARGE NOTE ADULT - CARE PROVIDERS DIRECT ADDRESSES
,[email or web address] | mentor | [email or web address],DirectAddress_Unknown

## 2018-05-04 NOTE — DISCHARGE NOTE ADULT - MEDICATION SUMMARY - MEDICATIONS TO TAKE
I will START or STAY ON the medications listed below when I get home from the hospital:    spironolactone 100 mg oral tablet  -- 1 tab(s) by mouth once a day   -- It is very important that you take or use this exactly as directed.  Do not skip doses or discontinue unless directed by your doctor.  May cause drowsiness or dizziness.    -- Indication: For Cryptogenic cirrhosis    aspirin 81 mg oral tablet, chewable  -- 1 tab(s) by mouth once a day  -- Indication: For CAD    LORazepam 1 mg oral tablet  -- 1 tab(s) by mouth once a day (at bedtime), As Needed  -- Indication: For Anxiety/insomnia    Levemir 100 units/mL subcutaneous solution  -- 9  subcutaneous once a day (at bedtime)  -- Indication: For Diabetes    HumaLOG  -- 9 unit(s) subcutaneous once a day  -- Indication: For Diabetes    hydrOXYzine hydrochloride 25 mg oral tablet  -- 1 tab(s) by mouth 4 times a day, As needed, Itching  -- Indication: For pruritis    docusate sodium 100 mg oral capsule  -- 1 cap(s) by mouth once a day  -- Indication: For Constipation    omeprazole 20 mg oral delayed release tablet  -- 1 tab(s) by mouth once a day  -- Indication: For GERD    Multiple Vitamins oral tablet  -- 1 tab(s) by mouth once a day  -- Indication: For Nutrition    folic acid 1 mg oral tablet  -- 1 tab(s) by mouth once a day  -- Indication: For Anemia

## 2018-05-04 NOTE — DISCHARGE NOTE ADULT - HOSPITAL COURSE
51 y/o F with PMHx psoriasis, HTN, CAD s/p CABG and PCI, cryptogenic cirrhosis, CLL, large incisional hernia with ostomy bag and drainage transferred from [facility] and admitted for abdominal bleeding s/p paracentesis on 4/23/18, received 2 units of blood in ED and 2L NS bolus with subsequent maintenance fluids. After treatment, hemoglobin stabilized at 8.9. Patient was also found to have DKA on admission, which was treated until anion gap of 17 returned to normal of 12. Patient had leukocytosis, which was not treated as it was consistent with diagnosis of CLL. Patient set up with home hospice services upon discharge. 51 y/o F with PMHx psoriasis, HTN, CAD s/p CABG and PCI, cryptogenic cirrhosis, CLL, large incisional hernia with ostomy bag and drainage transferred from [facility] and admitted for abdominal bleeding s/p paracentesis on 4/23/18.  She received 2 units of blood in ED and 2L NS bolus with subsequent maintenance fluids. After treatment, hemoglobin stabilized at 8.9. Patient was also found to have DKA on admission, which was treated until anion gap of 17 returned to normal of 12. Patient had leukocytosis, which was not treated as it was consistent with diagnosis of CLL. Patient set up with home hospice services upon discharge.

## 2018-05-04 NOTE — DISCHARGE NOTE ADULT - PATIENT PORTAL LINK FT
You can access the [facility] Patient Portal, offered by [facility], by registering with the following website: [email or web address]

## 2018-05-04 NOTE — DISCHARGE NOTE ADULT - MEDICATION SUMMARY - MEDICATIONS TO CHANGE
I will SWITCH the dose or number of times a day I take the medications listed below when I get home from the hospital:    ciprofloxacin 500 mg oral tablet  -- 1 tab(s) by mouth 3 times a week

## 2018-05-04 NOTE — DISCHARGE NOTE ADULT - PROVIDER TOKENS
TOKEN:'[id number]',FREE:[LAST:[Hospice],PHONE:[(   )    -],FAX:[(   )    -],ADDRESS:[You were set up with home hospice upon discharge]]

## 2018-05-07 DIAGNOSIS — K74.60 UNSPECIFIED CIRRHOSIS OF LIVER: ICD-10-CM

## 2018-05-08 DIAGNOSIS — L40.9 PSORIASIS, UNSPECIFIED: ICD-10-CM

## 2018-05-08 DIAGNOSIS — I11.0 HYPERTENSIVE HEART DISEASE WITH HEART FAILURE: ICD-10-CM

## 2018-05-08 DIAGNOSIS — A41.9 SEPSIS, UNSPECIFIED ORGANISM: ICD-10-CM

## 2018-05-08 DIAGNOSIS — K57.90 DIVERTICULOSIS OF INTESTINE, PART UNSPECIFIED, WITHOUT PERFORATION OR ABSCESS WITHOUT BLEEDING: ICD-10-CM

## 2018-05-08 DIAGNOSIS — K74.69 OTHER CIRRHOSIS OF LIVER: ICD-10-CM

## 2018-05-08 DIAGNOSIS — Z84.89 FAMILY HISTORY OF OTHER SPECIFIED CONDITIONS: ICD-10-CM

## 2018-05-08 DIAGNOSIS — I25.10 ATHEROSCLEROTIC HEART DISEASE OF NATIVE CORONARY ARTERY WITHOUT ANGINA PECTORIS: ICD-10-CM

## 2018-05-08 DIAGNOSIS — K65.2 SPONTANEOUS BACTERIAL PERITONITIS: ICD-10-CM

## 2018-05-08 DIAGNOSIS — D62 ACUTE POSTHEMORRHAGIC ANEMIA: ICD-10-CM

## 2018-05-08 DIAGNOSIS — T81.89XA OTHER COMPLICATIONS OF PROCEDURES, NOT ELSEWHERE CLASSIFIED, INITIAL ENCOUNTER: ICD-10-CM

## 2018-05-08 DIAGNOSIS — E11.10 TYPE 2 DIABETES MELLITUS WITH KETOACIDOSIS WITHOUT COMA: ICD-10-CM

## 2018-05-08 DIAGNOSIS — Z95.1 PRESENCE OF AORTOCORONARY BYPASS GRAFT: ICD-10-CM

## 2018-05-08 DIAGNOSIS — E87.2 ACIDOSIS: ICD-10-CM

## 2018-05-08 DIAGNOSIS — Z95.5 PRESENCE OF CORONARY ANGIOPLASTY IMPLANT AND GRAFT: ICD-10-CM

## 2018-05-08 DIAGNOSIS — R18.8 OTHER ASCITES: ICD-10-CM

## 2018-05-08 DIAGNOSIS — K43.2 INCISIONAL HERNIA WITHOUT OBSTRUCTION OR GANGRENE: ICD-10-CM

## 2018-05-08 DIAGNOSIS — K76.7 HEPATORENAL SYNDROME: ICD-10-CM

## 2018-05-08 DIAGNOSIS — Z51.5 ENCOUNTER FOR PALLIATIVE CARE: ICD-10-CM

## 2018-05-08 DIAGNOSIS — I50.22 CHRONIC SYSTOLIC (CONGESTIVE) HEART FAILURE: ICD-10-CM

## 2018-05-08 DIAGNOSIS — E87.1 HYPO-OSMOLALITY AND HYPONATREMIA: ICD-10-CM

## 2018-05-08 DIAGNOSIS — C91.10 CHRONIC LYMPHOCYTIC LEUKEMIA OF B-CELL TYPE NOT HAVING ACHIEVED REMISSION: ICD-10-CM

## 2018-05-08 DIAGNOSIS — Z66 DO NOT RESUSCITATE: ICD-10-CM

## 2018-05-09 ENCOUNTER — APPOINTMENT (OUTPATIENT)
Dept: SURGERY | Facility: CLINIC | Age: 53
End: 2018-05-09
Payer: MEDICARE

## 2018-05-09 VITALS
SYSTOLIC BLOOD PRESSURE: 98 MMHG | WEIGHT: 71 LBS | HEIGHT: 55 IN | BODY MASS INDEX: 16.43 KG/M2 | DIASTOLIC BLOOD PRESSURE: 52 MMHG

## 2018-05-09 PROCEDURE — 99215 OFFICE O/P EST HI 40 MIN: CPT | Mod: GV

## 2018-05-18 ENCOUNTER — EMERGENCY (EMERGENCY)
Facility: HOSPITAL | Age: 53
LOS: 0 days | Discharge: HOME | End: 2018-05-18
Attending: EMERGENCY MEDICINE | Admitting: EMERGENCY MEDICINE
Payer: MEDICARE

## 2018-05-18 DIAGNOSIS — T81.31XA DISRUPTION OF EXTERNAL OPERATION (SURGICAL) WOUND, NOT ELSEWHERE CLASSIFIED, INITIAL ENCOUNTER: ICD-10-CM

## 2018-05-18 DIAGNOSIS — Z98.890 OTHER SPECIFIED POSTPROCEDURAL STATES: Chronic | ICD-10-CM

## 2018-05-18 DIAGNOSIS — Z95.1 PRESENCE OF AORTOCORONARY BYPASS GRAFT: Chronic | ICD-10-CM
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
## 2018-05-18 DIAGNOSIS — Z79.4 LONG TERM (CURRENT) USE OF INSULIN: ICD-10-CM

## 2018-05-18 DIAGNOSIS — Z51.89 ENCOUNTER FOR OTHER SPECIFIED AFTERCARE: ICD-10-CM

## 2018-05-18 DIAGNOSIS — Z79.899 OTHER LONG TERM (CURRENT) DRUG THERAPY: ICD-10-CM
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
## 2018-05-18 DIAGNOSIS — Z79.82 LONG TERM (CURRENT) USE OF ASPIRIN: ICD-10-CM
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
## 2018-05-18 PROCEDURE — 99285 EMERGENCY DEPT VISIT HI MDM: CPT | Mod: 25,GV

## 2018-05-18 PROCEDURE — 12032 INTMD RPR S/A/T/EXT 2.6-7.5: CPT | Mod: 59,GV

## 2018-05-18 NOTE — CONSULT NOTE ADULT - SUBJECTIVE AND OBJECTIVE BOX
HPI: 53 y/o F on hospice care with complicated medical hx and multiple visits to ED presenting from home with a leaking skin wound. She has extensive ascites as well as frail caput medusa. Pt met her surgeon in the ED for stitching of abdominal wall wound and covering with second skin. Family and pt agreeable to plan of wound closure and return home under hospice care. Surgery consulted for stitch for superficial abdominal wound.    PAST MEDICAL & SURGICAL HISTORY:  Psoriasis  HTN (hypertension)  CAD (coronary artery disease)  Diverticulitis  HTN (hypertension)  DM (diabetes mellitus)  Liver cirrhosis  Stented coronary artery  S/P CABG (coronary artery bypass graft)  CLL (chronic lymphocytic leukemia)  Hernia  S/P cystoscopy  H/O colostomy  S/P CABG (coronary artery bypass graft)  History of colon resection    PHYSICAL EXAM:  General Appearance: Appears well, NAD  Neck: Supple  Chest: Equal expansion bilaterally, equal breath sounds  CV: S1, S2, RRR  Abdomen: Soft,distended, RUQ superficial bleeding vessel that was stitched. Stopped bleed  Extremities: Grossly symmetric, WNL  Neuro: A&Ox3

## 2018-05-18 NOTE — CONSULT NOTE ADULT - ASSESSMENT
S/P stitching of superficial wound. S/P stitching of superficial wound.   senior resident on call note   attending dr rice placed 2 stiches : c ontrolled the bleeding and the ascites fluid leak and place duoderm on the abdominal wall .   plane u/s for abd to evaluate for the ascites .   local wound care .

## 2018-05-18 NOTE — ED PROVIDER NOTE - OBJECTIVE STATEMENT
53 y/o F on hospice care with complicated medical hx and multiple visits to ED presenting from home with a leaking skin wound. She has extensive ascites as well as frail caput medusa. Pt met her surgeon in the ED for stitching of abdominal wall wound and covering with second skin. Family and pt agreeable to plan of wound closure and return home under hospice care

## 2018-06-06 ENCOUNTER — APPOINTMENT (OUTPATIENT)
Dept: SURGERY | Facility: CLINIC | Age: 53
End: 2018-06-06
Payer: MEDICARE

## 2018-06-06 VITALS
DIASTOLIC BLOOD PRESSURE: 64 MMHG | BODY MASS INDEX: 15.16 KG/M2 | HEIGHT: 55 IN | SYSTOLIC BLOOD PRESSURE: 102 MMHG | WEIGHT: 65.5 LBS

## 2018-06-06 PROCEDURE — 99215 OFFICE O/P EST HI 40 MIN: CPT

## 2018-06-08 ENCOUNTER — APPOINTMENT (OUTPATIENT)
Dept: OTOLARYNGOLOGY | Facility: CLINIC | Age: 53
End: 2018-06-08
Payer: MEDICARE

## 2018-06-08 VITALS — WEIGHT: 65 LBS | HEIGHT: 55 IN | BODY MASS INDEX: 15.04 KG/M2

## 2018-06-08 DIAGNOSIS — H61.22 IMPACTED CERUMEN, LEFT EAR: ICD-10-CM

## 2018-06-08 PROCEDURE — 69210 REMOVE IMPACTED EAR WAX UNI: CPT | Mod: GV

## 2018-06-08 PROCEDURE — 99203 OFFICE O/P NEW LOW 30 MIN: CPT | Mod: 25

## 2018-06-08 RX ORDER — ASPIRIN 81 MG
6.5 TABLET, DELAYED RELEASE (ENTERIC COATED) ORAL
Qty: 1 | Refills: 1 | Status: ACTIVE | COMMUNITY
Start: 2018-06-08 | End: 1900-01-01

## 2018-06-11 ENCOUNTER — OUTPATIENT (OUTPATIENT)
Dept: OUTPATIENT SERVICES | Facility: HOSPITAL | Age: 53
LOS: 1 days | Discharge: HOME | End: 2018-06-11

## 2018-06-11 DIAGNOSIS — Z98.890 OTHER SPECIFIED POSTPROCEDURAL STATES: Chronic | ICD-10-CM

## 2018-06-11 DIAGNOSIS — R10.819 ABDOMINAL TENDERNESS, UNSPECIFIED SITE: ICD-10-CM

## 2018-06-11 DIAGNOSIS — Z95.1 PRESENCE OF AORTOCORONARY BYPASS GRAFT: Chronic | ICD-10-CM

## 2018-06-14 ENCOUNTER — RESULT REVIEW (OUTPATIENT)
Age: 53
End: 2018-06-14

## 2018-06-14 ENCOUNTER — OUTPATIENT (OUTPATIENT)
Dept: OUTPATIENT SERVICES | Facility: HOSPITAL | Age: 53
LOS: 1 days | Discharge: HOME | End: 2018-06-14
Payer: MEDICARE

## 2018-06-14 VITALS
OXYGEN SATURATION: 100 % | TEMPERATURE: 98 F | HEART RATE: 112 BPM | SYSTOLIC BLOOD PRESSURE: 102 MMHG | DIASTOLIC BLOOD PRESSURE: 57 MMHG | RESPIRATION RATE: 18 BRPM

## 2018-06-14 VITALS
WEIGHT: 74.96 LBS | RESPIRATION RATE: 18 BRPM | HEART RATE: 112 BPM | DIASTOLIC BLOOD PRESSURE: 62 MMHG | OXYGEN SATURATION: 100 % | TEMPERATURE: 98 F | HEIGHT: 72 IN | SYSTOLIC BLOOD PRESSURE: 90 MMHG

## 2018-06-14 DIAGNOSIS — Z98.890 OTHER SPECIFIED POSTPROCEDURAL STATES: Chronic | ICD-10-CM

## 2018-06-14 DIAGNOSIS — Z95.1 PRESENCE OF AORTOCORONARY BYPASS GRAFT: Chronic | ICD-10-CM

## 2018-06-14 PROCEDURE — 12032 INTMD RPR S/A/T/EXT 2.6-7.5: CPT | Mod: 59

## 2018-06-14 PROCEDURE — 49083 ABD PARACENTESIS W/IMAGING: CPT

## 2018-06-14 RX ORDER — INSULIN LISPRO 100/ML
9 VIAL (ML) SUBCUTANEOUS
Qty: 0 | Refills: 0 | COMMUNITY

## 2018-06-14 RX ORDER — FLUTICASONE PROPIONATE 220 MCG
0 AEROSOL WITH ADAPTER (GRAM) INHALATION
Qty: 0 | Refills: 0 | COMMUNITY

## 2018-06-14 RX ORDER — CIPROFLOXACIN LACTATE 400MG/40ML
1 VIAL (ML) INTRAVENOUS
Qty: 0 | Refills: 0 | COMMUNITY

## 2018-06-14 RX ORDER — INSULIN DETEMIR 100/ML (3)
9 INSULIN PEN (ML) SUBCUTANEOUS
Qty: 0 | Refills: 0 | COMMUNITY

## 2018-06-14 RX ORDER — OMEPRAZOLE 10 MG/1
1 CAPSULE, DELAYED RELEASE ORAL
Qty: 0 | Refills: 0 | COMMUNITY

## 2018-06-14 NOTE — H&P ADULT - FAMILY HISTORY
Mother  Still living? Unknown  Family history of cirrhosis of liver, Age at diagnosis: Age Unknown     Grandparent  Still living? Unknown  Family history of cirrhosis of liver, Age at diagnosis: Age Unknown

## 2018-06-14 NOTE — ASU PREOP CHECKLIST - ASSESSMENT, HISTORY & PHYSICAL COMPLETED AND ON MEDICAL RECORD
[facility] dosing service    Initial Pharmacokinetic Consult for Vancomycin Dosing     Aleida Adhikari is a 72year old male admitted on 2/23/18 who is being treated for sepsis.   Pharmacy has been asked to dose Vancomycin by Dr. Coco Bedoya h opportunity to assist in his care.     Nael Potter, PharmD  2/23/2018  4:59 PM  [address]: [phone] done

## 2018-06-14 NOTE — H&P ADULT - ATTENDING COMMENTS
53 yo F with PMHx of HTN, DM, CAD s/p CABG and stent (last stent 5 years ago), liver cirrhosis (idiopathic), CLL, ascites, s/p Hartmans and reversal for complicated diverticulitis and colovesicular fistula 3-4 years ago, has incisional hernia associated with previous parastomal site, large, nonobstructive, not surgical candidate, currently in hospice. Pt also has hepato-renal syndrome and abdominal skin maceration with multiple wound s/p closure.   Pt recently had leaking of ascites from the hernia for which had been sutured multiple time in the past. She also uses the colostomy bag on the leaking site. She has underwent a paracentesis a few time last in April.

## 2018-06-14 NOTE — BRIEF OPERATIVE NOTE - PRE-OP DX
Ascites of liver  06/14/2018    Active  Aubrey Reyes  Liver failure  06/14/2018    Active  Aubrey Reyes

## 2018-06-14 NOTE — H&P ADULT - NSHPPHYSICALEXAM_GEN_ALL_CORE
GENERAL: Frail. Cachexic  HEAD:  Atraumatic, Normocephalic  CHEST/LUNG: Clear to auscultation bilaterally; No wheeze  HEART: Regular rate and rhythm  ABDOMEN: + abdominal binder dressing. + stitch on paracentesis drainage site , large hernia  EXTREMITIES:  minimal edema  NEUROLOGY: non-focal

## 2018-06-14 NOTE — BRIEF OPERATIVE NOTE - PROCEDURE
<<-----Click on this checkbox to enter Procedure Paracentesis, abdominal  06/14/2018    Active  [email or web address] Suture of abdominal wall  06/15/2018    Active  CHASITY  Paracentesis, abdominal  06/14/2018    Active  Aubrey Reyes

## 2018-06-14 NOTE — BRIEF OPERATIVE NOTE - OPERATION/FINDINGS
ultrasound guidance was used to ensure no bowel was in the way, 1000cc of fluid was drained from the abdomen ultrasound guidance was used to ensure no bowel was in the way, 1000cc of fluid was drained from the abdomen.    3 areas of raw area

## 2018-06-14 NOTE — H&P ADULT - ASSESSMENT
53 yo F with PMHx of HTN, DM, CAD s/p CABG and stent (last stent 5 years ago), liver cirrhosis (idiopathic), CLL, ascites, s/p Hartmans and reversal for complicated diverticulitis and colovesicular fistula 3-4 years ago, has incisional hernia associated with previous parastomal site, large, nonobstructive, not surgical candidate, currently in hospice. Pt also has hepato-renal syndrome and abdominal skin maceration with multiple wound s/p closure.   Pt recently had leaking of ascites from the hernia for which had been sutured multiple time in the past. She also uses the colostomy bag on the leaking site. She has underwent a paracentesis a few time last in April.       CODE status: DNR/DNI

## 2018-06-15 LAB
GRAM STN FLD: SIGNIFICANT CHANGE UP
SPECIMEN SOURCE: SIGNIFICANT CHANGE UP

## 2018-06-18 LAB — NON-GYNECOLOGICAL CYTOLOGY STUDY: SIGNIFICANT CHANGE UP

## 2018-06-19 DIAGNOSIS — K57.90 DIVERTICULOSIS OF INTESTINE, PART UNSPECIFIED, WITHOUT PERFORATION OR ABSCESS WITHOUT BLEEDING: ICD-10-CM

## 2018-06-19 DIAGNOSIS — E11.9 TYPE 2 DIABETES MELLITUS WITHOUT COMPLICATIONS: ICD-10-CM

## 2018-06-19 DIAGNOSIS — Z85.6 PERSONAL HISTORY OF LEUKEMIA: ICD-10-CM

## 2018-06-19 DIAGNOSIS — Z95.5 PRESENCE OF CORONARY ANGIOPLASTY IMPLANT AND GRAFT: ICD-10-CM

## 2018-06-19 LAB
CULTURE RESULTS: SIGNIFICANT CHANGE UP
SPECIMEN SOURCE: SIGNIFICANT CHANGE UP

## 2018-06-20 DIAGNOSIS — K72.90 HEPATIC FAILURE, UNSPECIFIED WITHOUT COMA: ICD-10-CM

## 2018-06-20 DIAGNOSIS — C91.10 CHRONIC LYMPHOCYTIC LEUKEMIA OF B-CELL TYPE NOT HAVING ACHIEVED REMISSION: ICD-10-CM

## 2018-06-20 DIAGNOSIS — E11.9 TYPE 2 DIABETES MELLITUS WITHOUT COMPLICATIONS: ICD-10-CM

## 2018-06-20 DIAGNOSIS — I10 ESSENTIAL (PRIMARY) HYPERTENSION: ICD-10-CM

## 2018-06-20 DIAGNOSIS — R18.8 OTHER ASCITES: ICD-10-CM

## 2018-06-20 DIAGNOSIS — K74.60 UNSPECIFIED CIRRHOSIS OF LIVER: ICD-10-CM

## 2018-07-03 ENCOUNTER — INPATIENT (INPATIENT)
Facility: HOSPITAL | Age: 53
LOS: 1 days | Discharge: HOSPICE | End: 2018-07-05
Attending: INTERNAL MEDICINE | Admitting: INTERNAL MEDICINE
Payer: OTHER MISCELLANEOUS

## 2018-07-03 ENCOUNTER — APPOINTMENT (OUTPATIENT)
Dept: SURGERY | Facility: CLINIC | Age: 53
End: 2018-07-03
Payer: MEDICARE

## 2018-07-03 ENCOUNTER — OUTPATIENT (OUTPATIENT)
Dept: OUTPATIENT SERVICES | Facility: HOSPITAL | Age: 53
LOS: 1 days | Discharge: HOME | End: 2018-07-03

## 2018-07-03 VITALS
OXYGEN SATURATION: 100 % | HEART RATE: 85 BPM | TEMPERATURE: 96 F | RESPIRATION RATE: 16 BRPM | SYSTOLIC BLOOD PRESSURE: 99 MMHG | DIASTOLIC BLOOD PRESSURE: 54 MMHG

## 2018-07-03 DIAGNOSIS — Z98.890 OTHER SPECIFIED POSTPROCEDURAL STATES: Chronic | ICD-10-CM

## 2018-07-03 DIAGNOSIS — Z95.1 PRESENCE OF AORTOCORONARY BYPASS GRAFT: Chronic | ICD-10-CM

## 2018-07-03 DIAGNOSIS — R18.8 OTHER ASCITES: ICD-10-CM

## 2018-07-03 PROCEDURE — 12032 INTMD RPR S/A/T/EXT 2.6-7.5: CPT

## 2018-07-03 PROCEDURE — 99285 EMERGENCY DEPT VISIT HI MDM: CPT

## 2018-07-03 RX ORDER — HYDROXYZINE HCL 10 MG
25 TABLET ORAL ONCE
Qty: 0 | Refills: 0 | Status: COMPLETED | OUTPATIENT
Start: 2018-07-03 | End: 2018-07-04

## 2018-07-03 RX ORDER — ASPIRIN/CALCIUM CARB/MAGNESIUM 324 MG
81 TABLET ORAL DAILY
Qty: 0 | Refills: 0 | Status: DISCONTINUED | OUTPATIENT
Start: 2018-07-03 | End: 2018-07-05

## 2018-07-03 RX ORDER — PANTOPRAZOLE SODIUM 20 MG/1
40 TABLET, DELAYED RELEASE ORAL
Qty: 0 | Refills: 0 | Status: DISCONTINUED | OUTPATIENT
Start: 2018-07-03 | End: 2018-07-05

## 2018-07-03 RX ORDER — DOCUSATE SODIUM 100 MG
100 CAPSULE ORAL DAILY
Qty: 0 | Refills: 0 | Status: DISCONTINUED | OUTPATIENT
Start: 2018-07-03 | End: 2018-07-04

## 2018-07-03 RX ORDER — IBUPROFEN 200 MG
400 TABLET ORAL EVERY 6 HOURS
Qty: 0 | Refills: 0 | Status: DISCONTINUED | OUTPATIENT
Start: 2018-07-03 | End: 2018-07-05

## 2018-07-03 RX ORDER — SPIRONOLACTONE 25 MG/1
100 TABLET, FILM COATED ORAL DAILY
Qty: 0 | Refills: 0 | Status: DISCONTINUED | OUTPATIENT
Start: 2018-07-03 | End: 2018-07-05

## 2018-07-03 RX ADMIN — Medication 1 MILLIGRAM(S): at 18:57

## 2018-07-03 NOTE — ED PROVIDER NOTE - NS ED ROS FT
Constitutional: No fever or chills. Normal appetite.   Eyes: No vision changes.  ENT: No hearing changes. No ear pain. No sore throat.  Neck: No neck pain or stiffness.  Cardiovascular: No chest pain, palpitations, or edema.  Pulmonary: No cough or SOB. No hemoptysis.  Abdominal: No abdominal pain, nausea, vomiting, or diarrhea.   : No dysuria or frequency. No hematuria.   Neuro: No headache, syncope, or dizziness.  MS: No back pain. No calf pain/swelling.  Psych: No suicidal or homicidal ideations.

## 2018-07-03 NOTE — ED ADULT NURSE NOTE - OBJECTIVE STATEMENT
Pt c/o of wound check came from surgeons office after appointment. Sutures intact draining clear yellow fluid. No fever at this time.

## 2018-07-03 NOTE — H&P ADULT - HISTORY OF PRESENT ILLNESS
52 y.o. f w/ PMHx of CLL, cryptogenic cirrhosis, ventral hernia (cannot be repaired 2/2 poor anatomy), CLL (so not a candidate for liver transplant) presents from home for a therapeutic paracentesis, has had multiple paracentesis in the past. Well known to Surgery attending, Dr. Bergman.  In the ER, when the patient went to the bathroom, apparently there a burst in one of the pockets of the belly and fluid starting draining out. Sx was consulted, said no need to do US of the abdomen and to do paracentesis as it happened by itself. Patient is comfort measures and being followed by the Hospice nurse, Marie.

## 2018-07-03 NOTE — ED PROVIDER NOTE - MEDICAL DECISION MAKING DETAILS
52 female here for wound management with inoperable ascites will be admitted for overnight care and continued wound management, hospice notified.

## 2018-07-03 NOTE — H&P ADULT - ASSESSMENT
52 y.o. f w/ PMHx of CLL, cryptogenic cirrhosis, ventral hernia (cannot be repaired 2/2 poor anatomy), CLL (so not a candidate for liver transplant) presents from home for a therapeutic paracentesis, has had multiple paracentesis in the past. Well known to Surgery attending, Dr. Bergman.  In the ER, when the patient went to the bathroom, apparently there a burst in one of the pockets of the belly and fluid starting draining out. Sx was consulted, said no need to do US of the abdomen and to do paracentesis as it happened by itself. Patient is comfort measures and being followed by the Hospice nurseMarie.     #) Cryptogenic Liver Cirrhosis  -currently undergoing auto-paracentesis  -Sx (Dr. Bergman) following  -Hospice following, hospice nurseMarie    #) Code Status  -DNR/ DNI    #) Dispo  -Hospice c/s pending  -comfort measures, no labwork being done 52 y.o. f w/ PMHx of CLL, cryptogenic cirrhosis, ventral hernia (cannot be repaired 2/2 poor anatomy), CLL (so not a candidate for liver transplant) presents from home for a therapeutic paracentesis, has had multiple paracentesis in the past. Well known to Surgery attending, Dr. Bergman.  In the ER, when the patient went to the bathroom, apparently there a burst in one of the pockets of the belly and fluid starting draining out. Sx was consulted, said no need to do US of the abdomen and to do paracentesis as it happened by itself. Patient is comfort measures and being followed by the Hospice nurseMarie.     #) Cryptogenic Liver Cirrhosis  -currently undergoing auto-paracentesis  -Sx (Dr. Bergman) following  -Hospice following, hospice nurseMarie  -Hospice nurse faxed over care plan, it is in the chart    #) Code Status  -DNR/ DNI    #) Dispo  -from home, hospice has been visiting home since October  -comfort measures, no labwork being done

## 2018-07-03 NOTE — ED PROVIDER NOTE - ATTENDING CONTRIBUTION TO CARE
I personally evaluated the patient. I reviewed the Resident’s or Physician Assistant’s note (as assigned above), and agree with the findings and plan except as documented in my note.     52 female here for persistent ascites known to Dr. Scott. Significant PMH and patient is known to hospice as well. Situation is inoperable per surgeon. ROS otherwise negative.     PE: ill female in no distress, appears wasted but non toxic. CHEST: normal work of breathing. CV: pulses intact. ABD: colostomy bag noted with clear fluid. abdominal wound noted with weeping of ascites fluid. SKIN: jaundice noted.     Impression: ascites, metastatic disease    Plan: IV labs imaging supportive care and admission

## 2018-07-03 NOTE — ED PROVIDER NOTE - PROGRESS NOTE DETAILS
Discussed with Dr. Bergman (surgery attg). Pt has large ventral hernia that cannot be repaired 2/2 liver cirrhosis. Unable to get transplant b/c of CLL.   Pt has chronic ascites for which she was going to be admitted for IR drainage on Thursday, but no longer needs that drainage after her leakage of ascites fluid here. Per Dr. Bergman, pt can be watched overnight on the hospice service and likely can go home tomorrow. Dee Duffy, hospice nurse on call. Hospice nurse on call who states that pt is cleared for admission. She will send over care plan to fax at [phone]. Spoke with emily Hinojosa's hospice nurse, who is under the impression that there is a secondary pocket of ascites fluid that may also need to be tapped. She states that an abdominal ultrasound should be done to evaluate the quantity of fluid for possible drainage on Thursday.

## 2018-07-03 NOTE — H&P ADULT - ATTENDING COMMENTS
patient seen and examined independently on morning rounds, chart reviewed and discussed with medicine resident and agree with the above H/P and assessment and plan with the following addendum:     hpi- 53 yo woman with h/o CLL, cryptogenic cirrhosis and nonoperable ventral hernia who is on home hospice (lives with sister) who p/w increased cirrhosis with plan for paracentesis.  In ED spontaneous autoparacentesis and evaluated by surgery (who placed sutures in area of drainage).  Admitted to medicine service for monitoring overnight- f/u with surgical team (Dr. Bergman) and plan for discharge home with hospice in am- continue comfort measures- hospice following- DNR/DNI    pmhx- as per above    ROS- as per above otherwise unremarkable review of systems    allergies- NKDA    PE:  GEN-NAD, AAOx3, thin, cachectic  HEENT- NCAT, PERRLA, EOMI  NECK- supple no jvd, no lymphadenopathy  PULM-  fair air entry  CVS- +s1/s2 RRR   GI- +distended--+sutures c/d/i (no signs of leaking ascitic fluid at this time)--   EXT- no edema  SKIN- no rashes/no lesions  NEURO- nonfocal      labs/radiology--none done (patient comfort measures)    a/p:   #Cryptogenic liver cirrhosis with recurrent ascites and severe protein calorie malnutrition  -s/p spontaneous paracentesis in ED  -f/u with surgical service (Dr. Bergman)  -hospice team following---anticipate likely discharge home with hospice in am  -comfort measures only---no blood draws- no alternative means for feeding  - poor prognosis  -DNR/DNI    Anticipate discharge back home in am with home hospice

## 2018-07-03 NOTE — ED PROVIDER NOTE - OBJECTIVE STATEMENT
52y F on hospice care w PMH end stage cirrhosis 2/2 cryptogenic causes, CLL, large ventral hernia, HTN, DM, CAD s/p CABG presents for drainage of ascites from abdominal wounds. No f/c/n/v/d. Pt was initially coming to the ED for admission for IR therapeutic paracentesis, but while in ED, pt had large leakage of ascites fluid onto the floor of the bathroom, relieving the abdominal pressure.   Now, pt continues to have leakage from the wound, and is here for overnight admission to hospice for observation of the wound.

## 2018-07-03 NOTE — ED PROVIDER NOTE - PHYSICAL EXAMINATION
Constitutional: Well developed, well nourished. NAD. Good general hygiene  Head: Atraumatic.  Eyes: EOMI without discomfort. Scleral icterus.   ENT: No nasal discharge. Mucous membranes moist.  Neck: Supple. Painless ROM.  Cardiovascular: Regular rhythm. Regular rate. Normal S1 and S2. No murmurs. 2+ pulses in all extremities.   Pulmonary: Normal respiratory rate and effort. Lungs clear to auscultation bilaterally. No wheezing, rales, or rhonchi. Bilateral, equal lung expansion.   Abdominal: Soft. Nondistended. Colostomy bag containing yellow but clear fluid in place over closed abdominal wound on abdomen. Multiple other weeping, sutured abdominal wounds.   Extremities: Pelvis stable. Ambulates stably.   Skin: No rashes. Jaundiced.   Neuro: AAOx3. No focal neurological deficits.  Psych: Normal mood. Normal affect.

## 2018-07-04 RX ORDER — LACTULOSE 10 G/15ML
10 SOLUTION ORAL DAILY
Qty: 0 | Refills: 0 | Status: DISCONTINUED | OUTPATIENT
Start: 2018-07-04 | End: 2018-07-05

## 2018-07-04 RX ORDER — INSULIN DETEMIR 100/ML (3)
9 INSULIN PEN (ML) SUBCUTANEOUS AT BEDTIME
Qty: 0 | Refills: 0 | Status: DISCONTINUED | OUTPATIENT
Start: 2018-07-04 | End: 2018-07-04

## 2018-07-04 RX ORDER — HYDROXYZINE HCL 10 MG
25 TABLET ORAL THREE TIMES A DAY
Qty: 0 | Refills: 0 | Status: DISCONTINUED | OUTPATIENT
Start: 2018-07-04 | End: 2018-07-05

## 2018-07-04 RX ORDER — INSULIN LISPRO 100/ML
9 VIAL (ML) SUBCUTANEOUS
Qty: 0 | Refills: 0 | Status: DISCONTINUED | OUTPATIENT
Start: 2018-07-04 | End: 2018-07-05

## 2018-07-04 RX ORDER — FOLIC ACID 0.8 MG
1 TABLET ORAL DAILY
Qty: 0 | Refills: 0 | Status: DISCONTINUED | OUTPATIENT
Start: 2018-07-04 | End: 2018-07-05

## 2018-07-04 RX ORDER — DOCUSATE SODIUM 100 MG
100 CAPSULE ORAL
Qty: 0 | Refills: 0 | Status: DISCONTINUED | OUTPATIENT
Start: 2018-07-04 | End: 2018-07-05

## 2018-07-04 RX ORDER — CIPROFLOXACIN LACTATE 400MG/40ML
500 VIAL (ML) INTRAVENOUS
Qty: 0 | Refills: 0 | Status: DISCONTINUED | OUTPATIENT
Start: 2018-07-04 | End: 2018-07-05

## 2018-07-04 RX ORDER — INSULIN GLARGINE 100 [IU]/ML
9 INJECTION, SOLUTION SUBCUTANEOUS AT BEDTIME
Qty: 0 | Refills: 0 | Status: DISCONTINUED | OUTPATIENT
Start: 2018-07-04 | End: 2018-07-05

## 2018-07-04 RX ORDER — FLUTICASONE PROPIONATE 50 MCG
3 SPRAY, SUSPENSION NASAL ONCE
Qty: 0 | Refills: 0 | Status: COMPLETED | OUTPATIENT
Start: 2018-07-04 | End: 2018-07-04

## 2018-07-04 RX ADMIN — INSULIN GLARGINE 9 UNIT(S): 100 INJECTION, SOLUTION SUBCUTANEOUS at 22:22

## 2018-07-04 RX ADMIN — Medication 1 MILLIGRAM(S): at 22:21

## 2018-07-04 RX ADMIN — Medication 3 SPRAY(S): at 18:59

## 2018-07-04 RX ADMIN — Medication 9 UNIT(S): at 14:41

## 2018-07-04 RX ADMIN — Medication 25 MILLIGRAM(S): at 22:22

## 2018-07-04 RX ADMIN — Medication 100 MILLIGRAM(S): at 16:38

## 2018-07-04 RX ADMIN — Medication 25 MILLIGRAM(S): at 02:10

## 2018-07-04 RX ADMIN — Medication 81 MILLIGRAM(S): at 11:58

## 2018-07-04 RX ADMIN — LACTULOSE 10 GRAM(S): 10 SOLUTION ORAL at 22:21

## 2018-07-04 RX ADMIN — Medication 81 MILLIGRAM(S): at 02:10

## 2018-07-04 RX ADMIN — Medication 100 MILLIGRAM(S): at 11:58

## 2018-07-04 RX ADMIN — PANTOPRAZOLE SODIUM 40 MILLIGRAM(S): 20 TABLET, DELAYED RELEASE ORAL at 07:32

## 2018-07-04 RX ADMIN — SPIRONOLACTONE 100 MILLIGRAM(S): 25 TABLET, FILM COATED ORAL at 05:54

## 2018-07-04 RX ADMIN — Medication 25 MILLIGRAM(S): at 16:38

## 2018-07-04 RX ADMIN — Medication 100 MILLIGRAM(S): at 02:10

## 2018-07-04 NOTE — CONSULT NOTE ADULT - ASSESSMENT
52 year old female with superficial skin wound on abdomen, leaking ascites, s/p sutures in ED by Dr. Bergman  -diuretics  -abdominal binder  -no further surgical intervention at this time

## 2018-07-04 NOTE — CONSULT NOTE ADULT - SUBJECTIVE AND OBJECTIVE BOX
GENERAL SURGERY CONSULT NOTE    HPI:  53 yo F with PMHx of HTN, DM, CAD s/p CABG and stent (last stent 5 years ago), liver cirrhosis (idiopathic), CLL, ascites, s/p Hartmans and reversal for complicated diverticulitis and colovesicular fistula 3-4 years ago, has incisional hernia associated with previous parastomal site, large, nonobstructive, not surgical candidate, currently in hospice. Pt also has hepato-renal syndrome and abdominal skin maceration with multiple wound s/p closure. Pt recently had leaking of ascites from the hernia for which had been sutured multiple time in the past. She also uses the colostomy bag on the leaking site. Pt. presents from home for therapeutic paracentesis, while in the ED "one of the pockets bursted and started draining".      PAST MEDICAL & SURGICAL HISTORY:  Psoriasis  HTN (hypertension)  CAD (coronary artery disease)  Diverticulitis  HTN (hypertension)  DM (diabetes mellitus)  Liver cirrhosis  Stented coronary artery  S/P CABG (coronary artery bypass graft)  CLL (chronic lymphocytic leukemia)  Hernia  S/P cystoscopy  H/O colostomy  S/P CABG (coronary artery bypass graft)  History of colon resection    No Known Allergies    Home Medications:  aspirin 81 mg oral tablet, chewable: 1 tab(s) orally once a day (2018 11:28)  ciprofloxacin 500 mg oral tablet: 1 tab(s) orally 3 times a week (2018 11:28)  docusate potassium 100 mg oral capsule:  (2018 11:28)  docusate sodium 100 mg oral capsule: 1 cap(s) orally once a day (2018 11:28)  fluticasone propionate:  (2018 11:28)  folic acid 1 mg oral tablet: 1 tab(s) orally once a day (2018 11:28)  HumaLO unit(s) subcutaneous once a day (2018 11:28)  hydrOXYzine hydrochloride 25 mg oral tablet: 1 tab(s) orally 4 times a day, As needed, Itching (2018 11:28)  Levemir 100 units/mL subcutaneous solution: 9  subcutaneous once a day (at bedtime) (2018 11:28)  LORazepam 1 mg oral tablet: 1 tab(s) orally once a day (at bedtime), As Needed (2018 11:28)  Multiple Vitamins oral tablet: 1 tab(s) orally once a day (2018 11:28)  omeprazole 20 mg oral delayed release tablet: 1 tab(s) orally once a day (2018 11:28)      PHYSICAL EXAM  Vital Signs Last 24 Hrs  T(F): 97.7 (2018 05:42), Max: 97.7 (2018 22:15)  HR: 91 (2018 05:42) (85 - 95)  BP: 85/49 (2018 05:42) (85/49 - 133/56)  RR: 16 (2018 05:42) (16 - 16)  SpO2: 100% (2018 20:52) (100% - 100%)    PHYSICAL EXAM:        MEDICATIONS:   MEDICATIONS  (STANDING):  aspirin  chewable 81 milliGRAM(s) Oral daily  docusate sodium 100 milliGRAM(s) Oral daily  pantoprazole    Tablet 40 milliGRAM(s) Oral before breakfast  spironolactone 100 milliGRAM(s) Oral daily    MEDICATIONS  (PRN):  ibuprofen  Tablet 400 milliGRAM(s) Oral every 6 hours PRN Mild pain  LORazepam     Tablet 1 milliGRAM(s) Oral Once PRN Nausea and/or Vomiting      LAB/STUDIES: none    IMAGING: none GENERAL SURGERY CONSULT NOTE    HPI:  53 yo F with PMHx of HTN, DM, CAD s/p CABG and stent (last stent 5 years ago), liver cirrhosis (idiopathic), CLL, ascites, s/p Hartmans and reversal for complicated diverticulitis and colovesicular fistula 3-4 years ago, has incisional hernia associated with previous parastomal site, large, nonobstructive, not surgical candidate, currently in hospice. Pt also has hepato-renal syndrome and abdominal skin maceration with multiple wound s/p closure. Pt recently had leaking of ascites from the hernia for which had been sutured multiple time in the past. She also uses the colostomy bag on the leaking site. Pt. presents from home for therapeutic paracentesis, while in the ED "one of the pockets bursted and started draining".      PAST MEDICAL & SURGICAL HISTORY:  Psoriasis  HTN (hypertension)  CAD (coronary artery disease)  Diverticulitis  HTN (hypertension)  DM (diabetes mellitus)  Liver cirrhosis  Stented coronary artery  S/P CABG (coronary artery bypass graft)  CLL (chronic lymphocytic leukemia)  Hernia  S/P cystoscopy  H/O colostomy  S/P CABG (coronary artery bypass graft)  History of colon resection    No Known Allergies    Home Medications:  aspirin 81 mg oral tablet, chewable: 1 tab(s) orally once a day (2018 11:28)  ciprofloxacin 500 mg oral tablet: 1 tab(s) orally 3 times a week (2018 11:28)  docusate potassium 100 mg oral capsule:  (2018 11:28)  docusate sodium 100 mg oral capsule: 1 cap(s) orally once a day (2018 11:28)  fluticasone propionate:  (2018 11:28)  folic acid 1 mg oral tablet: 1 tab(s) orally once a day (2018 11:28)  HumaLO unit(s) subcutaneous once a day (2018 11:28)  hydrOXYzine hydrochloride 25 mg oral tablet: 1 tab(s) orally 4 times a day, As needed, Itching (2018 11:28)  Levemir 100 units/mL subcutaneous solution: 9  subcutaneous once a day (at bedtime) (2018 11:28)  LORazepam 1 mg oral tablet: 1 tab(s) orally once a day (at bedtime), As Needed (2018 11:28)  Multiple Vitamins oral tablet: 1 tab(s) orally once a day (2018 11:28)  omeprazole 20 mg oral delayed release tablet: 1 tab(s) orally once a day (2018 11:28)      PHYSICAL EXAM  Vital Signs Last 24 Hrs  T(F): 97.7 (2018 05:42), Max: 97.7 (2018 22:15)  HR: 91 (2018 05:42) (85 - 95)  BP: 85/49 (2018 05:42) (85/49 - 133/56)  RR: 16 (2018 05:42) (16 - 16)  SpO2: 100% (2018 20:52) (100% - 100%)    PHYSICAL EXAM:  PHYSICAL EXAM:  GENERAL: A&O, NAD  CHEST/LUNG: Clear to auscultation bilaterally  HEART: Regular rate and rhythm  ABDOMEN: Soft, Nontender, distended abdomen with colostomy appliance over leaking area, multiple sutures scattered throughout abdominal skin       MEDICATIONS:   MEDICATIONS  (STANDING):  aspirin  chewable 81 milliGRAM(s) Oral daily  docusate sodium 100 milliGRAM(s) Oral daily  pantoprazole    Tablet 40 milliGRAM(s) Oral before breakfast  spironolactone 100 milliGRAM(s) Oral daily    MEDICATIONS  (PRN):  ibuprofen  Tablet 400 milliGRAM(s) Oral every 6 hours PRN Mild pain  LORazepam     Tablet 1 milliGRAM(s) Oral Once PRN Nausea and/or Vomiting      LAB/STUDIES: none    IMAGING: none

## 2018-07-04 NOTE — CONSULT NOTE ADULT - ATTENDING COMMENTS
pt known to me .   seen yesterday and today.   She is doing better.   Continue diet.   Continue local wound care.

## 2018-07-05 ENCOUNTER — TRANSCRIPTION ENCOUNTER (OUTPATIENT)
Age: 53
End: 2018-07-05

## 2018-07-05 VITALS
TEMPERATURE: 96 F | RESPIRATION RATE: 18 BRPM | DIASTOLIC BLOOD PRESSURE: 52 MMHG | HEART RATE: 103 BPM | SYSTOLIC BLOOD PRESSURE: 91 MMHG

## 2018-07-05 DIAGNOSIS — Z02.9 ENCOUNTER FOR ADMINISTRATIVE EXAMINATIONS, UNSPECIFIED: ICD-10-CM

## 2018-07-05 DIAGNOSIS — Z85.9 PERSONAL HISTORY OF MALIGNANT NEOPLASM, UNSPECIFIED: ICD-10-CM

## 2018-07-05 RX ORDER — LACTULOSE 10 G/15ML
15 SOLUTION ORAL
Qty: 0 | Refills: 0 | COMMUNITY
Start: 2018-07-05

## 2018-07-05 RX ORDER — IBUPROFEN 200 MG
1 TABLET ORAL
Qty: 0 | Refills: 0 | COMMUNITY
Start: 2018-07-05

## 2018-07-05 RX ORDER — DOCUSATE SODIUM 100 MG
0 CAPSULE ORAL
Qty: 0 | Refills: 0 | COMMUNITY

## 2018-07-05 RX ADMIN — Medication 25 MILLIGRAM(S): at 05:10

## 2018-07-05 RX ADMIN — Medication 9 UNIT(S): at 11:46

## 2018-07-05 RX ADMIN — Medication 25 MILLIGRAM(S): at 13:06

## 2018-07-05 RX ADMIN — Medication 81 MILLIGRAM(S): at 13:06

## 2018-07-05 RX ADMIN — SPIRONOLACTONE 100 MILLIGRAM(S): 25 TABLET, FILM COATED ORAL at 05:10

## 2018-07-05 RX ADMIN — PANTOPRAZOLE SODIUM 40 MILLIGRAM(S): 20 TABLET, DELAYED RELEASE ORAL at 09:53

## 2018-07-05 RX ADMIN — Medication 500 MILLIGRAM(S): at 10:44

## 2018-07-05 RX ADMIN — Medication 1 MILLIGRAM(S): at 13:08

## 2018-07-05 RX ADMIN — Medication 100 MILLIGRAM(S): at 05:10

## 2018-07-05 NOTE — DISCHARGE NOTE ADULT - MEDICATION SUMMARY - MEDICATIONS TO TAKE
I will START or STAY ON the medications listed below when I get home from the hospital:    spironolactone 100 mg oral tablet  -- 1 tab(s) by mouth once a day   -- It is very important that you take or use this exactly as directed.  Do not skip doses or discontinue unless directed by your doctor.  May cause drowsiness or dizziness.    -- Indication: For ascites    aspirin 81 mg oral tablet, chewable  -- 1 tab(s) by mouth once a day  -- Indication: For CAD    ibuprofen 400 mg oral tablet  -- 1 tab(s) by mouth every 6 hours, As needed, Mild pain  -- Indication: For pain control    LORazepam 1 mg oral tablet  -- 1 tab(s) by mouth once a day (at bedtime), As Needed  -- Indication: For Insomnia    Levemir 100 units/mL subcutaneous solution  -- 9  subcutaneous once a day (at bedtime)  -- Indication: For DM    HumaLOG  -- 9 unit(s) subcutaneous once a day  -- Indication: For DM    hydrOXYzine hydrochloride 25 mg oral tablet  -- 1 tab(s) by mouth 4 times a day, As needed, Itching  -- Indication: For Anexity     lactulose 10 g/15 mL oral syrup  -- 15 milliliter(s) by mouth once a day  -- Indication: For Constipation    docusate potassium 100 mg oral capsule  -- Indication: For Constipation    docusate sodium 100 mg oral capsule  -- 1 cap(s) by mouth once a day  -- Indication: For Constipation    omeprazole 20 mg oral delayed release tablet  -- 1 tab(s) by mouth once a day  -- Indication: For GERD    ciprofloxacin 500 mg oral tablet  -- 1 tab(s) by mouth 3 times a week  -- Indication: For ascites    fluticasone propionate  -- Indication: For bronchodilator    Multiple Vitamins oral tablet  -- 1 tab(s) by mouth once a day  -- Indication: For supplement    folic acid 1 mg oral tablet  -- 1 tab(s) by mouth once a day  -- Indication: For supplement

## 2018-07-05 NOTE — DISCHARGE NOTE ADULT - PLAN OF CARE
reduction of ascites fluid volume 1- patient to have long term Auto paracentesis for cryptogenetic liver cirrhosis   2- Hospice care patient control blood glucose within normal level continue Lantus 9 units at bed time, Lispro 9 units before breakfast  patient to follow up with PMD

## 2018-07-05 NOTE — DIETITIAN INITIAL EVALUATION ADULT. - ORAL INTAKE PTA
fair appetite, follows a diabetic diet with medical food supplement/fair fair/fair appetite, follows a diabetic diet and consumes Glucerna daily PTA. NKFA.

## 2018-07-05 NOTE — DIETITIAN INITIAL EVALUATION ADULT. - ENERGY NEEDS
Estimated Calorie Needs: (MSJ X 1.1-1.2AF)= 2020-4874 Kcal for weight gain  Estimated protein Needs: 38g-46g (1.0-1.2g/IBW) for liver disease w/o ascites  Estimated Fluids Needs: (1 ml/kcal) Estimated Calorie Needs: (MSJ X 1.2-1.3 AF + 250kcal  )=1752-1877Kcal for weight gain  Estimated protein Needs: 38g-44g (1.2-1.4g/CBW) for malnutrition  Estimated Fluids Needs: (1 ml/kcal)

## 2018-07-05 NOTE — DISCHARGE NOTE ADULT - PATIENT PORTAL LINK FT
You can access the [facility], offered by [facility], by registering with the following website: [email or web address]

## 2018-07-05 NOTE — DISCHARGE NOTE ADULT - CARE PLAN
Principal Discharge DX:	Ascites  Goal:	reduction of ascites fluid volume  Assessment and plan of treatment:	1- patient to have long term Auto paracentesis for cryptogenetic liver cirrhosis   2- Hospice care patient  Secondary Diagnosis:	DM (diabetes mellitus)  Goal:	control blood glucose within normal level  Assessment and plan of treatment:	continue Lantus 9 units at bed time, Lispro 9 units before breakfast  patient to follow up with PMD

## 2018-07-05 NOTE — DIETITIAN INITIAL EVALUATION ADULT. - PHYSICAL APPEARANCE
emaciated/BMI 17.9 underweight/BMI 17.9, IBW: 80#, UBW: 69kg per pt, unable verify exact wt before chronic illness; ?accuracy of wt loss

## 2018-07-05 NOTE — DIETITIAN INITIAL EVALUATION ADULT. - OTHER INFO
Initial assessment LOS: 52y F on hospice care w PMH end stage cryptogenic liver cirrhosis; large ventral hernia, HTN, DM, CAD s/p CABG presents for drainage of ascites from abdominal wounds. Initial assessment LOS: 52y F on hospice care w PMH end stage cryptogenic liver cirrhosis; large ventral hernia, HTN, DM, CAD s/p CABG presents for drainage of ascites from abdominal wounds. Pt is anticipated for d/c today and is comfort measures only, no blood draws, no alternative means for feeding. Upon assessment, pt is already receiving oral supplement as requested but is not currently active in diet order. Will speak with LIP to order supplement per pt request. No further nutritional interventions at this time. RD to sign off.

## 2018-07-05 NOTE — DISCHARGE NOTE ADULT - HOSPITAL COURSE
52 y.o. f w/ PMHx of CLL, cryptogenic cirrhosis, ventral hernia (cannot be repaired 2/2 poor anatomy), CLL (so not a candidate for liver transplant) presents from home for a therapeutic paracentesis, has had multiple paracentesis in the past. Well known to Surgery attending, Dr. Bergman.  In the ER, when the patient went to the bathroom, apparently there a burst in one of the pockets of the belly and fluid starting draining out. Sx was consulted, said no need to do US of the abdomen and to do paracentesis as it happened by itself. Patient is comfort measures and being followed by the Hospice nurse, Marie. Pt recently had leaking of ascites from the hernia for which had been sutured multiple time in the past. She also uses the colostomy bag on the leaking site. surgery was consulted and no surgery intervention at this time. Continue diet and local wound care. patient to continue hospice care on discharge. sister is aware of condition. patient is cleared by surgery and medicine to go home.

## 2018-07-05 NOTE — PROGRESS NOTE ADULT - ASSESSMENT
52 y.o. f w/ PMHx of CLL, cryptogenic cirrhosis, ventral hernia (cannot be repaired 2/2 poor anatomy), CLL (so not a candidate for liver transplant) presents from home for a therapeutic paracentesis, has had multiple paracentesis in the past. Well known to Surgery attending, Dr. Bergman.  In the ER, when the patient went to the bathroom, apparently there a burst in one of the pockets of the belly and fluid starting draining out. Sx was consulted, said no need to do US of the abdomen and to do paracentesis as it happened by itself. Patient is comfort measures and being followed by the Hospice nurseMarie.     #) Cryptogenic Liver Cirrhosis  -currently undergoing auto-paracentesis  -Sx (Dr. Bergman)  has seen her  -Hospice following, hospice nurse,      # Hx of CLL    #) Code Status  -DNR/ DNI    #) Dispo  -from home, hospice has been visiting home since October  -comfort measures, no labwork being done

## 2018-07-05 NOTE — PROGRESS NOTE ADULT - SUBJECTIVE AND OBJECTIVE BOX
SUBJECTIVE:    Patient is a 52y old Female who presents with a chief complaint of Therapeutic Paracentesis (05 Jul 2018 10:38)    Currently admitted to medicine with the primary diagnosis of Ascites     Today is hospital day 2d. This morning she is resting comfortably in bed and reports no new issues or overnight events.     PAST MEDICAL & SURGICAL HISTORY  Psoriasis  HTN (hypertension)  CAD (coronary artery disease)  Diverticulitis  HTN (hypertension)  DM (diabetes mellitus)  Liver cirrhosis  Stented coronary artery  S/P CABG (coronary artery bypass graft)  CLL (chronic lymphocytic leukemia)  Hernia  S/P cystoscopy  H/O colostomy  S/P CABG (coronary artery bypass graft)  History of colon resection    SOCIAL HISTORY:  Negative for smoking/alcohol/drug use.     ALLERGIES:  No Known Allergies    MEDICATIONS:  STANDING MEDICATIONS  aspirin  chewable 81 milliGRAM(s) Oral daily  ciprofloxacin     Tablet 500 milliGRAM(s) Oral <User Schedule>  docusate sodium 100 milliGRAM(s) Oral two times a day  folic acid 1 milliGRAM(s) Oral daily  hydrOXYzine hydrochloride 25 milliGRAM(s) Oral three times a day  insulin glargine Injectable (LANTUS) 9 Unit(s) SubCutaneous at bedtime  insulin lispro Injectable (HumaLOG) 9 Unit(s) SubCutaneous before breakfast  lactulose Syrup 10 Gram(s) Oral daily  pantoprazole    Tablet 40 milliGRAM(s) Oral before breakfast  spironolactone 100 milliGRAM(s) Oral daily    PRN MEDICATIONS  ibuprofen  Tablet 400 milliGRAM(s) Oral every 6 hours PRN  LORazepam     Tablet 1 milliGRAM(s) Oral daily PRN  LORazepam     Tablet 1 milliGRAM(s) Oral Once PRN    VITALS:   T(F): 96.4  HR: 103  BP: 91/52  RR: 18  SpO2: --    LABS:                        RADIOLOGY:    PHYSICAL EXAM:  GEN: No acute distress-- frail small woman  LUNGS: Clear to auscultation bilaterally   HEART: S1/S2 present. RRR.   ABD/ GI: colostomy bag in place with drainage from wound  EXT: NC/NC/NE/2+PP/SANCHEZ  NEURO: AAOX3

## 2018-07-05 NOTE — DIETITIAN INITIAL EVALUATION ADULT. - PERTINENT MEDS FT
Docusate, Folic Acid, Glargine, Pantoprazole, Spironoloctone Docusate, Folic Acid, Glargine, Pantoprazole, Spironolactone docusate, folic Acid, insulin, pantoprazole, spironolactone, abx, lactulose,

## 2018-07-05 NOTE — PROGRESS NOTE ADULT - SUBJECTIVE AND OBJECTIVE BOX
Patient is a 52 year old female, hospice home care patient secondary to Cirrhosis of the liver.  Admitted to [facility] post therapeutic paracentesis secondary to continued drainage from abdomen at site of paracentesis.  Patient with multiple open draining Fistulas on abdomen covered with colostomy drainage pouches to protect skin.  Patient with PMH of Cirrhosis with gross acities, CLL, DM2, Diverticulitis, CAD, HTN, Psoriasis, large Abdominal hernia, PSH; CABG, Urethral stents, Colostomy, Colostomy reversal. On contact precautions for ESBL in her urine.      Patient appeared in good spirits,  OOB to chair with assistance.      A/P:  Per discussion with Sammi MALDONADO and Gilberto  on [facility] 4C patient scheduled for D/C home with hospice services this afternoon, pending safe D/C plan.

## 2018-07-11 LAB
ALBUMIN SERPL ELPH-MCNC: 2.5 G/DL
ALP BLD-CCNC: 815 U/L
ALT SERPL-CCNC: 50 U/L
ANION GAP SERPL CALC-SCNC: 20 MMOL/L
AST SERPL-CCNC: 33 U/L
BASOPHILS # BLD AUTO: 0.05 K/UL
BASOPHILS NFR BLD AUTO: 0.1 %
BILIRUB SERPL-MCNC: 2 MG/DL
BUN SERPL-MCNC: 38 MG/DL
CALCIUM SERPL-MCNC: 8.1 MG/DL
CHLORIDE SERPL-SCNC: 87 MMOL/L
CO2 SERPL-SCNC: 12 MMOL/L
CREAT SERPL-MCNC: 0.9 MG/DL
EOSINOPHIL # BLD AUTO: 0.2 K/UL
EOSINOPHIL NFR BLD AUTO: 0.6 %
GLUCOSE SERPL-MCNC: 133 MG/DL
HCT VFR BLD CALC: 20.4 %
HGB BLD-MCNC: 6.2 G/DL
IMM GRANULOCYTES NFR BLD AUTO: 0.6 %
INR PPP: 1.07 RATIO
LYMPHOCYTES # BLD AUTO: 18.17 K/UL
LYMPHOCYTES NFR BLD AUTO: 53.2 %
MAN DIFF?: NORMAL
MCHC RBC-ENTMCNC: 26.7 PG
MCHC RBC-ENTMCNC: 30.4 G/DL
MCV RBC AUTO: 87.9 FL
MONOCYTES # BLD AUTO: 1.72 K/UL
MONOCYTES NFR BLD AUTO: 5 %
NEUTROPHILS # BLD AUTO: 13.79 K/UL
NEUTROPHILS NFR BLD AUTO: 40.5 %
PLATELET # BLD AUTO: 340 K/UL
POTASSIUM SERPL-SCNC: 6.4 MMOL/L
PROT SERPL-MCNC: 4.4 G/DL
PT BLD: 11.5 SEC
RBC # BLD: 2.32 M/UL
RBC # FLD: 14.8 %
SODIUM SERPL-SCNC: 119 MMOL/L
WBC # FLD AUTO: 34.15 K/UL

## 2018-07-12 DIAGNOSIS — Z95.5 PRESENCE OF CORONARY ANGIOPLASTY IMPLANT AND GRAFT: ICD-10-CM

## 2018-07-12 DIAGNOSIS — K43.9 VENTRAL HERNIA WITHOUT OBSTRUCTION OR GANGRENE: ICD-10-CM

## 2018-07-12 DIAGNOSIS — E11.9 TYPE 2 DIABETES MELLITUS WITHOUT COMPLICATIONS: ICD-10-CM

## 2018-07-12 DIAGNOSIS — Z85.6 PERSONAL HISTORY OF LEUKEMIA: ICD-10-CM

## 2018-07-12 DIAGNOSIS — I25.10 ATHEROSCLEROTIC HEART DISEASE OF NATIVE CORONARY ARTERY WITHOUT ANGINA PECTORIS: ICD-10-CM

## 2018-07-12 DIAGNOSIS — R18.8 OTHER ASCITES: ICD-10-CM

## 2018-07-12 DIAGNOSIS — K76.7 HEPATORENAL SYNDROME: ICD-10-CM

## 2018-07-12 DIAGNOSIS — K74.69 OTHER CIRRHOSIS OF LIVER: ICD-10-CM

## 2018-07-12 DIAGNOSIS — Z66 DO NOT RESUSCITATE: ICD-10-CM

## 2018-07-12 DIAGNOSIS — Z95.1 PRESENCE OF AORTOCORONARY BYPASS GRAFT: ICD-10-CM

## 2018-07-12 DIAGNOSIS — I10 ESSENTIAL (PRIMARY) HYPERTENSION: ICD-10-CM

## 2018-08-06 ENCOUNTER — APPOINTMENT (OUTPATIENT)
Dept: OTOLARYNGOLOGY | Facility: CLINIC | Age: 53
End: 2018-08-06

## 2018-08-15 ENCOUNTER — APPOINTMENT (OUTPATIENT)
Dept: SURGERY | Facility: CLINIC | Age: 53
End: 2018-08-15

## 2019-02-02 NOTE — ED ADULT NURSE NOTE - DOES PATIENT HAVE ADVANCE DIRECTIVE
Ventricular Rate : 99   Atrial Rate : 99   P-R Interval : 166   QRS Duration : 84   Q-T Interval : 342   QTC Calculation(Bezet) : 438   P Axis : 48   R Axis : 37   T Axis : 5   Diagnosis : Normal sinus rhythm~Normal ECG~When compared with ECG of 20-FEB-2016 01:22,~No significant change was found~Confirmed by HERLINDA CONNELL ROBERT ([id number]) on 7/14/2017 5:34:29 PM     
No
Given head injury and mechanism will perform CT of head to r/o ICH or cerebral contusions.  Otheriwse likely has an acute concussion and will give concussion care instructions.   R knee injury - Xray to evaluate for fractures.  Compression, elevation, and ice.  Pt declines pain medications currently.  Ice pack applied to R knee.

## 2020-01-10 NOTE — ED PROVIDER NOTE - OBJECTIVE STATEMENT
51 y/o female to ED with hx of liver cirrhosis, CLL s/p multiple complicated colon surgeries, presents for small leaking wound over hernia site. Pt seen by Dr. Bergman in his office and sent to the ED where he evaluated and repaired the wound. Pt has no other complaints and requesting to go home.
Adequate: hears normal conversation without difficulty

## 2020-04-02 PROBLEM — R18.8 ASCITES: Status: ACTIVE | Noted: 2018-01-09

## 2020-07-15 NOTE — ED ADULT TRIAGE NOTE - CHIEF COMPLAINT QUOTE
Pt c/o draining fluid from abdominal wound, generalized weakness & fatigue, hx liver ca hospice pt. DISPLAY PLAN FREE TEXT

## 2021-06-11 NOTE — ED PROVIDER NOTE - DISPOSITION TYPE
DISCHARGE

## 2021-12-28 NOTE — ED PROVIDER NOTE - CROS ED SKIN ALL NEG
Does patient have a history of any ophthalmologic issues?    Gluacoma?     Using glaucoma eye drops?    Artificial lens implants?    Any edema/degeneration/inflammation of any structures of the eyes?      Bimatoprost can permanently stain your iris as well as cause dispigmentation of the skin where it is applied.       negative...

## 2023-09-01 NOTE — PATIENT PROFILE ADULT. - CAREGIVER ADDRESS
Returned call to patient with  #[id number]. States that since Tuesday she has had N/T, migraine and nausea. Advised pt that these symptoms were the same that occurred earlier this week when she spoke to Dr Galeano on the phone and Dr. Galeano advised pt to see neurosurgery. Advised pt that the next step would be to wait for your appointment. Patient had multiple questions in regards to why she is seeing neurosurgery. Explained that this is d/t cyst that was found on MRI scan. Pt verbalized understanding. Advised if these symptoms occur and patient has extreme pain or increase in N/T then to go to nearest ER. Patient verbalized understanding.   [address] [place] [address]

## 2023-10-01 NOTE — ASU DISCHARGE PLAN (ADULT/PEDIATRIC). - NOTIFY
Swelling that continues/Numbness, color, or temperature change to extremity/Bleeding that does not stop/Persistent Nausea and Vomiting/Unable to Urinate/Pain not relieved by Medications/Fever greater than 101 Orbital.../Fat overlying ribcage.../Buccal...

## 2025-03-18 NOTE — DIETITIAN INITIAL EVALUATION ADULT. - ADHERENCE
I called and scheduled pt for her blood work before her appt in June. She is on a low dose steroid daily. Is there anything that she should avoid, like NSAIDS and alcohol. She forgot to ask yesterday at her visit with    fair/reports consuming a low carbohydrate meals Reports consuming a low carbohydrate meals, NKFA/fair fair